# Patient Record
Sex: FEMALE | Race: WHITE | NOT HISPANIC OR LATINO | Employment: FULL TIME | URBAN - METROPOLITAN AREA
[De-identification: names, ages, dates, MRNs, and addresses within clinical notes are randomized per-mention and may not be internally consistent; named-entity substitution may affect disease eponyms.]

---

## 2017-07-13 PROBLEM — O44.02 PLACENTA PREVIA ANTEPARTUM IN SECOND TRIMESTER: Status: ACTIVE | Noted: 2017-07-13

## 2017-08-17 ENCOUNTER — TELEPHONE (OUTPATIENT)
Dept: GYNECOLOGIC ONCOLOGY | Facility: CLINIC | Age: 32
End: 2017-08-17

## 2017-08-17 ENCOUNTER — OFFICE VISIT (OUTPATIENT)
Dept: MATERNAL FETAL MEDICINE | Facility: CLINIC | Age: 32
End: 2017-08-17
Attending: OBSTETRICS & GYNECOLOGY
Payer: MEDICAID

## 2017-08-17 VITALS
SYSTOLIC BLOOD PRESSURE: 118 MMHG | WEIGHT: 189.38 LBS | DIASTOLIC BLOOD PRESSURE: 80 MMHG | BODY MASS INDEX: 28.79 KG/M2

## 2017-08-17 DIAGNOSIS — O43.219 PLACENTA ACCRETA, UNSPECIFIED TRIMESTER: Primary | ICD-10-CM

## 2017-08-17 DIAGNOSIS — Z36.89 ENCOUNTER FOR ULTRASOUND TO CHECK FETAL GROWTH: ICD-10-CM

## 2017-08-17 PROCEDURE — 99202 OFFICE O/P NEW SF 15 MIN: CPT | Mod: PBBFAC | Performed by: OBSTETRICS & GYNECOLOGY

## 2017-08-17 PROCEDURE — 76817 TRANSVAGINAL US OBSTETRIC: CPT | Mod: 26,S$PBB,, | Performed by: OBSTETRICS & GYNECOLOGY

## 2017-08-17 PROCEDURE — 76816 OB US FOLLOW-UP PER FETUS: CPT | Mod: 26,S$PBB,, | Performed by: OBSTETRICS & GYNECOLOGY

## 2017-08-17 PROCEDURE — 76817 TRANSVAGINAL US OBSTETRIC: CPT | Mod: PBBFAC | Performed by: OBSTETRICS & GYNECOLOGY

## 2017-08-17 PROCEDURE — 99213 OFFICE O/P EST LOW 20 MIN: CPT | Mod: S$PBB,TH,25, | Performed by: OBSTETRICS & GYNECOLOGY

## 2017-08-17 PROCEDURE — 76816 OB US FOLLOW-UP PER FETUS: CPT | Mod: PBBFAC | Performed by: OBSTETRICS & GYNECOLOGY

## 2017-08-17 PROCEDURE — 99999 PR PBB SHADOW E&M-NEW PATIENT-LVL II: CPT | Mod: PBBFAC,,, | Performed by: OBSTETRICS & GYNECOLOGY

## 2017-08-17 PROCEDURE — 3008F BODY MASS INDEX DOCD: CPT | Mod: ,,, | Performed by: OBSTETRICS & GYNECOLOGY

## 2017-08-17 RX ORDER — PRENATAL WITH FERROUS FUM AND FOLIC ACID 3080; 920; 120; 400; 22; 1.84; 3; 20; 10; 1; 12; 200; 27; 25; 2 [IU]/1; [IU]/1; MG/1; [IU]/1; MG/1; MG/1; MG/1; MG/1; MG/1; MG/1; UG/1; MG/1; MG/1; MG/1; MG/1
1 TABLET ORAL DAILY
COMMUNITY
End: 2019-02-19

## 2017-08-17 NOTE — TELEPHONE ENCOUNTER
08/17/17 [facility]. TA/MA

## 2017-08-17 NOTE — TELEPHONE ENCOUNTER
----- Message from Chadwick Parson MA sent at 8/17/2017  2:11 PM CDT -----      ----- Message -----  From: Garret Small MD  Sent: 8/17/2017   1:58 PM  To: Latonia Echavarria RN, Geovanny Combs Staff    Lets make it 2 weeks please.  ----- Message -----  From: Latonia Echavarria RN  Sent: 8/17/2017  11:10 AM  To: Garret Small MD, Geovanny Combs Staff    Good Morning     would like if  could see this patient in a about 2-3 weeks.  She is currently 30  [facility]  Please call patient with a appointment.    Thanks    Latonia

## 2017-08-17 NOTE — TELEPHONE ENCOUNTER
----- Message from Latonia Echavarria RN sent at 8/17/2017 11:10 AM CDT -----  Good Morning     would like if  could see this patient in a about 2-3 weeks.  She is currently 30  Weeks with a Accreta.  Please call patient with a appointment.    Thanks    Latonia

## 2017-08-17 NOTE — LETTER
August 17, 2017      Waylon Chery MD  [address] [facility] [address]           [facility] [place], operated [facility] - Maternal Fetal Med  [address] [facility] [address]  Phone: [phone]          Patient: Louisa Morrow   MR Number: [id number]   YOB: 1985   Date of Visit: 8/17/2017       Dear Dr. Waylon Chery:    Thank you for referring Louisa Morrow to me for evaluation. Attached you will find relevant portions of my assessment and plan of care.    If you have questions, please do not hesitate to call me. I look forward to following Louisa Morrow along with you.    Sincerely,    Roney Simons III, MD    Enclosure  CC:  No Recipients    If you would like to receive this communication electronically, please contact [email or web address] or [phone] to request more information on HeTexted Link access.    For providers and/or their staff who would like to refer a patient to [facility], please contact us through our one-stop-shop provider referral line, [facility], at [phone].    If you feel you have received this communication in error or would no longer like to receive these types of communications, please e-mail [email or web address]

## 2017-08-17 NOTE — PROGRESS NOTES
"Indication  ========    r/t office visit: f/u growth/ hydronephrosis/ possible accreta .    History  ======    General History  Other: no screenings  Previous Outcomes   4  Para 2  Victor children born living (T) 2  Victor children born (T) 2  Victor living children (L) 2  Other: 1 partial molar pregnancy    Maternal Assessment  =================    Weight 86 kg  Weight (lb) 190 lb  BP syst 118 mmHg  BP diast 80 mmHg    Method  ======    Transvaginal ultrasound examination, Transabdominal ultrasound examination. View: Sufficient.    Pregnancy  =========    Victor pregnancy. Number of fetuses: 1.    Dating  ======    Cycle: regular cycle  GA by "stated dating" 30 w + 1 d  RAQUEL by "stated dating": 10/25/2017  Ultrasound examination on: 2017  GA by U/S based upon: AC, BPD, Femur, HC  GA by U/S 31 w + 3 d  RAQUEL by U/S: 10/16/2017  Assigned: Dating performed on 2017, based on the external assessment  Assigned GA 30 w + 1 d  Assigned RAQUEL: 10/25/2017    General Evaluation  ==============    Cardiac activity: present.  bpm.  Fetal movements: visualized.  Presentation: cephalic.  Placenta: anterior, complete previa .  Umbilical cord: 3 vessel cord.  Amniotic fluid: MVP 5.8 cm.    Biophysical Profile  ==============    2: Fetal breathing movements  2: Gross body movements  2: Fetal tone  2: Amniotic fluid volume  : Biophysical profile score    Fetal Biometry  ============    Fetal Biometry  BPD 78.5 mm 31w 4d Hadlock  .3 mm 32w 3d Jhoan  .7 mm 31w 5d Hadlock  .6 mm 32w 4d Hadlock  Femur 56.7 mm 29w 5d Hadlock  EFW 1,793 g 56% Horace  Calculated by: Hadlock (BPD-HC-AC-FL)  EFW (lb) 3 lb  EFW (oz) 15 oz  Cephalic index 0.78  HC / AC 1.01  FL / BPD 0.72  FL / AC 0.20  MVP 5.8 cm   bpm  Head / Face / Neck   6.4 mm    Fetal Anatomy  ============    Cranium: normal  Lateral ventricles: normal  Posterior fossa: normal  Stomach: normal  Bladder: normal  Rt " kidney: pelvis = 10mm  Lt kidney: pelvis = 26mm  Gender: male  Wants to know gender: yes    Maternal Structures  ===============    Uterus / Cervix  Approach: Transabdominal  Cervical length 49.4 mm  Ovaries / Tubes / Adnexa  Rt ovary: Visualized  Lt ovary: Visualized    Consultation  ==========    FUV by Dr. Waylon Chery for Placenta Previa and C/S x 2    31  RAQUEL 10/25/17; 30w1d    Prenatal record, chart and OB History reviewed  My last note reviewed  Pt interviewed and examined  Ultrasound performed  No interval problems  No leaking, bleeding or discharge  Good FM    Blood Type O+    OB HX   - Mj; 7-0; C/S at term   - Xayda; 7-0; RC/S at term without probs.      Impression  =========    There is a placenta previa and I feel certain that there is at least an increta; the parametrium does not appear to be significantly involved  There is bilateral, moderate, fetal hydronephrosis (L>R), otherwise, the anatomy appears wnl  Normal fetal growth,  Normal amniotic fluid volume.  Normal cervical length.    Recommendation  ==============    Will plan for delivery around 34 weeks (-20)  Will obtain Fetal MRI to look at parametrium in more detail  Pt will meet Dr. Garret Small (Gyn Onc) and William Edwards (MFM) in the next 2-3 weeks  Will also have her meet with OB Anesthesia  Neonatology will notify Peds Urology to follow up fetal obstructive uropathy during the baby's  stay.

## 2017-08-31 ENCOUNTER — OFFICE VISIT (OUTPATIENT)
Dept: MATERNAL FETAL MEDICINE | Facility: CLINIC | Age: 32
End: 2017-08-31
Payer: MEDICAID

## 2017-08-31 ENCOUNTER — OFFICE VISIT (OUTPATIENT)
Dept: ANESTHESIOLOGY | Facility: OTHER | Age: 32
End: 2017-08-31
Attending: OBSTETRICS & GYNECOLOGY
Payer: MEDICAID

## 2017-08-31 VITALS
SYSTOLIC BLOOD PRESSURE: 121 MMHG | HEIGHT: 68 IN | WEIGHT: 193.56 LBS | DIASTOLIC BLOOD PRESSURE: 68 MMHG | BODY MASS INDEX: 29.34 KG/M2

## 2017-08-31 DIAGNOSIS — O43.219 PLACENTA ACCRETA, UNSPECIFIED TRIMESTER: ICD-10-CM

## 2017-08-31 PROCEDURE — 76815 OB US LIMITED FETUS(S): CPT | Mod: PBBFAC | Performed by: OBSTETRICS & GYNECOLOGY

## 2017-08-31 PROCEDURE — 3008F BODY MASS INDEX DOCD: CPT | Mod: ,,, | Performed by: OBSTETRICS & GYNECOLOGY

## 2017-08-31 PROCEDURE — 99212 OFFICE O/P EST SF 10 MIN: CPT | Mod: PBBFAC,25 | Performed by: OBSTETRICS & GYNECOLOGY

## 2017-08-31 PROCEDURE — 99999 PR PBB SHADOW E&M-EST. PATIENT-LVL II: CPT | Mod: PBBFAC,,, | Performed by: OBSTETRICS & GYNECOLOGY

## 2017-08-31 PROCEDURE — 99213 OFFICE O/P EST LOW 20 MIN: CPT | Mod: TH,S$PBB,, | Performed by: OBSTETRICS & GYNECOLOGY

## 2017-08-31 PROCEDURE — 76815 OB US LIMITED FETUS(S): CPT | Mod: 26,S$PBB,, | Performed by: OBSTETRICS & GYNECOLOGY

## 2017-08-31 NOTE — CONSULTS
[facility]    Date:    2017  10:57 AM     Anesthesia Consult: outpatient    Initial Consultation: Yes     Requested by: Obstetrician / MFM  Consult documentation sent back to physician.    Chief Complaint: placenta accreta    Diagnosis: placenta accreta    Reason for Consult: Anesthetic recommendations for delivery    Allergies:  Review of patient's allergies indicates no known allergies.    History of Present Illness:    Patient is a 32 years old,  female,  with placenta previa &  suspected increta. H/o C/S X 2 & D&C X 1. History also pertinent for significant gestational GERD & anxiety with prior c-sections. Per Dr. Montoya's note:     There is a placenta previa and I feel certain that there is at least an increta; the parametrium does not appear to be significantly involved  There is bilateral, moderate, fetal hydronephrosis (L>R), otherwise, the anatomy appears wnl  Normal fetal growth,  Normal amniotic fluid volume.  Normal cervical length.     Recommendation  ==============     Will plan for delivery around 34 weeks (-)  Will obtain Fetal MRI to look at parametrium in more detail  Pt will meet Dr. Garret Small (Gyn Onc) and William Edwards (MFM) in the next 2-3 weeks  Will also have her meet with OB Anesthesia      Past medical history:    Past Medical History:   Diagnosis Date    BV (bacterial vaginosis)     Migraines        Past surgical history:    Past Surgical History:   Procedure Laterality Date     SECTION      L Arm surgery      Primary Cesarian         Family history:    No family history on file.    Social History:    Social History     Social History    Marital status: Single     Spouse name: N/A    Number of children: N/A    Years of education: N/A     Occupational History    Not on file.     Social History Main Topics    Smoking status: Former Smoker     Types: Cigarettes    Smokeless tobacco: Never Used    Alcohol use No     "Drug use: No      Comment: +THC on 3/13/2017, states "has smoked since then"    Sexual activity: Yes     Partners: Male     Other Topics Concern    Not on file     Social History Narrative    No narrative on file     Medication:    Current Outpatient Prescriptions on File Prior to Visit   Medication Sig Dispense Refill    butalbital-acetaminophen-caffeine -40 mg (FIORICET, ESGIC) -40 mg per tablet Take 1 tablet by mouth every 4 (four) hours as needed for Headaches. 15 tablet 0    PNV,CALCIUM 72/IRON/FOLIC ACID (PRENATAL VITAMIN) Tab Take 1 tablet by mouth once daily.       No current facility-administered medications on file prior to visit.      Past anesthesia history:    Hx of general anesthesia problems: No    Diagnostic Studies    I have reviewed the following. Relevant findings as noted:    Blood group: O POS   CBC:   Lab Results   Component Value Date    WBC 17.2 (H) 2013    RBC 3.55 (L) 2013    HGB 10.9 (L) 2013    HCT 33.3 (L) 2013     2013     BMP: No results found for: GLU, NA, K, CL, CO2, BUN, CREATININE, CALCIUM, PROT, ALBUMIN  Coagulation: No results found for: INR, APTT    Review of Systems     Constitution: feels well  Respiratory:  None  Cardiovascular:  No HTN or heart disease  Hematology: no bleeding or clotting disorders  Gastrointestinal:  Significant gestational GERD, symptomatic when supine  Musculoskeletal:  None  Neurologic:  None  Psych: anxiety with prior  deliveries  Endocrine:  None    Physical Examination:     · Vital signs: HR 86 R 16 /68 SpO2 97%      General appearance: healthy, alert, no distresswell developed, well nourished female  Eye: negative, conjunctivae/corneas clear. PERRL, EOM's intact. Fundi benign.  Pulm: breath sounds equal and symmetric  Cardiac: regular rate and rhythm, good peripheral veins  Abdomen: gravid  Neuro: normal without focal findings  Musculoskeletal: not examined  Airway: TMD 6cm, good " open, dentition intact, FROM neck,  I (soft palate, uvula, fauces, and tonsillar pillars visible)      Problem Assessment    ASA 3 - Patient with moderate systemic disease with functional limitations    History of present disease is positive for placenta previa with suspected increta              Plans & Recommendations    Our anesthesia care plan consists of CSE vs. GETA for delivery depending on surgical plan at time of delivery, with GETA for delayed hysterectomy if indicated.She does state that she was extremely nervous for her 2 prior c-sections so GETA may be a good option for this patient at time of delivery.  However of note she does have significant gGERD. We discussed large bore PIV access, +/- central line placement, as well as arterial line placement for close BP monitoring & lab draws. We also discussed the potential need for massive transfusion, & ICU admission postpartum for extended monitoring.     Complexity: high    Risks:hemorrhage, massive transfusion, airway complications, death    Entertained and answered all question to the patient's and family's satisfaction.            Nelly Oliveros MD

## 2017-08-31 NOTE — LETTER
August 31, 2017      Waylon Chery MD  [address] [facility] [address] [facility] [address]           [facility] - Maternal Fetal Med  [address] [facility] [address]  Phone: [phone]          Patient: Louisa Morrow   MR Number: [id number]   YOB: 1985   Date of Visit: 8/31/2017       Dear Dr. Waylon Chery:    Thank you for referring Louisa Morrow to me for evaluation. Attached you will find relevant portions of my assessment and plan of care.    If you have questions, please do not hesitate to call me. I look forward to following Louisa Morrow along with you.    Sincerely,    Ismael Edwards MD    Enclosure  CC:  No Recipients    If you would like to receive this communication electronically, please contact [email or web address] or [phone] to request more information on [employer] Link access.    For providers and/or their staff who would like to refer a patient to [facility], please contact us through our one-stop-shop provider referral line, [facility], at [phone].    If you feel you have received this communication in error or would no longer like to receive these types of communications, please e-mail [email or web address]

## 2017-09-01 ENCOUNTER — INITIAL CONSULT (OUTPATIENT)
Dept: GYNECOLOGIC ONCOLOGY | Facility: CLINIC | Age: 32
End: 2017-09-01
Payer: MEDICAID

## 2017-09-01 VITALS
DIASTOLIC BLOOD PRESSURE: 63 MMHG | BODY MASS INDEX: 29.63 KG/M2 | HEART RATE: 103 BPM | WEIGHT: 194.88 LBS | SYSTOLIC BLOOD PRESSURE: 116 MMHG

## 2017-09-01 DIAGNOSIS — O43.213 PLACENTA ACCRETA IN THIRD TRIMESTER: ICD-10-CM

## 2017-09-01 DIAGNOSIS — O44.02 PLACENTA PREVIA ANTEPARTUM IN SECOND TRIMESTER: Primary | ICD-10-CM

## 2017-09-01 PROCEDURE — 99205 OFFICE O/P NEW HI 60 MIN: CPT | Mod: S$PBB,TH,, | Performed by: OBSTETRICS & GYNECOLOGY

## 2017-09-01 PROCEDURE — 99212 OFFICE O/P EST SF 10 MIN: CPT | Mod: PBBFAC | Performed by: OBSTETRICS & GYNECOLOGY

## 2017-09-01 PROCEDURE — 3008F BODY MASS INDEX DOCD: CPT | Mod: ,,, | Performed by: OBSTETRICS & GYNECOLOGY

## 2017-09-01 PROCEDURE — 99999 PR PBB SHADOW E&M-EST. PATIENT-LVL II: CPT | Mod: PBBFAC,,, | Performed by: OBSTETRICS & GYNECOLOGY

## 2017-09-01 RX ORDER — PROMETHAZINE HYDROCHLORIDE 25 MG/1
25 TABLET ORAL EVERY 4 HOURS
COMMUNITY
End: 2017-10-02 | Stop reason: SDUPTHER

## 2017-09-01 NOTE — PROGRESS NOTES
"Indication  ========    Evaluate placenta accreta .    History  ======    General History  Other: no screenings  Previous Outcomes   4  Para 2  Victor children born living (T) 2  Victor children born (T) 2  Victor living children (L) 2  Other: 1 partial molar pregnancy    Method  ======    Transabdominal ultrasound examination.    Pregnancy  =========    Victor pregnancy. Number of fetuses: 1.    Dating  ======    Cycle: regular cycle  GA by "stated dating" 32 w + 1 d  RAQUEL by "stated dating": 10/25/2017  Assigned: Dating performed on 2017, based on the external assessment  Assigned GA 32 w + 1 d  Assigned RAQUEL: 10/25/2017    General Evaluation  ==============    Cardiac activity: present.  bpm.  Fetal movements: visualized.  Presentation: cephalic.  Placenta: anterior, anterior, partial previa, Placenta accreta.    Fetal Anatomy  ===========    Gender: male.    Consultation  ==========    Chart and notes from prior visit reviewed.    A limited ultrasound evaluation was performed today to evaluate the placenta and the anterior uterine surface again. We continue to see  evidence of a loss of the interface between the myometrium and the placenta. This appears consistent with a morbidly adherent placenta.  There is no evidence of obvious bladder involvement but we do see a number of lacunae within the placenta. There does not appear to be any  protrusion of placental mass into the bladder but there is vascularity between the bladder and the placenta.    I discussed the usual management strategy at length with Louisa and her . I explained our rationale for typically trying to leave the  placenta in situ but that in some circumstances if there is profuse vaginal bleeding this may not be possible. We reviewed the risks of the  surgery including bleeding, infection, damage to the bowel and bladder, the potential need for massive transfusion, and the potential need for  radical pelvic " surgery. We also discussed that this will be through a vertical skin incision. We discussed the role of interventional radiology and  embolization with the ultimate goal of trying to delay definitive surgical management until the vascularity has diminished markedly to reduce the  amount of transfusion and to hopefully reduce the need for bladder resection.    She met with Dr. Oliveros in OB anesthesia earlier today and she will meet with Dr. Small in GYN oncology tomorrow. We will then coordinate a  time for delivery at 34-35 weeks. I explained that while this is early in there are some risks of prematurity at this gestational age, we will plan  on giving her corticosteroids to enhance fetal lung maturity prior to this. We choose 34 weeks to try to avoid the onset of  labor and  bleeding.    I overall spent approximately 20 minutes in face to face time with the patient and her family, greater than 50% of which was in counseling and  care coordination.    Impression  =========    Placenta previa with anterior placenta accreta  No obvious bladder or parametrial involvement.    Recommendation  ==============    1. Seeing Dr. Small in Gyn Oncology   2. Will plan delivery week of -  3. Steroids 3-4 days prior to delivery.    Thank you again for allowing us to participate in the care of your patients. If you have any questions concerning today's consultation, feel free  to contact me or one of my partners. We can be reached at [phone] during normal business hours. If you have a question after normal  business hours, please contact Labor and Delivery at [phone].

## 2017-09-01 NOTE — PROGRESS NOTES
Subjective:      Patient ID: Louisa Morrow is a 32 y.o. female.    Chief Complaint: Advice Only      HPI  Presents today for surgical planning related to her known placenta accreta/increta.  She has had 2 previous C/S and was noted to have a previa prompting MFM eval. She is currently planning to deliver around 34 weeks, which would be the week of -.  She is asymptomatic.  Previously seen and counseled by Per Simons and Jerry regarding our approach to delivery.  Review of Systems   Constitutional: Negative for activity change, appetite change, chills, fatigue and fever.   HENT: Negative for hearing loss, mouth sores, nosebleeds, sore throat and tinnitus.    Eyes: Negative for visual disturbance.   Respiratory: Negative for cough, chest tightness, shortness of breath and wheezing.    Cardiovascular: Negative for chest pain and leg swelling.   Gastrointestinal: Negative for abdominal distention, abdominal pain, blood in stool, constipation, diarrhea, nausea and vomiting.   Genitourinary: Negative for dysuria, flank pain, frequency, hematuria, pelvic pain, vaginal bleeding, vaginal discharge and vaginal pain.   Musculoskeletal: Negative for arthralgias and back pain.   Skin: Negative for rash.   Neurological: Negative for dizziness, seizures, syncope, weakness and numbness.   Hematological: Does not bruise/bleed easily.   Psychiatric/Behavioral: Negative for confusion and sleep disturbance. The patient is not nervous/anxious.         Past Medical History:   Diagnosis Date    BV (bacterial vaginosis)     Migraines      Past Surgical History:   Procedure Laterality Date     SECTION      L Arm surgery      Primary Cesarian  2010     Family History   Problem Relation Age of Onset    Lung cancer Maternal Grandfather     Cancer Paternal Grandmother      Social History     Social History    Marital status: Single     Spouse name: N/A    Number of children: N/A    Years of education: N/A  "    Occupational History    Not on file.     Social History Main Topics    Smoking status: Former Smoker     Types: Cigarettes     Quit date: 07/2016    Smokeless tobacco: Never Used    Alcohol use No    Drug use: No      Comment: +THC on 3/13/2017, states "has smoked since then"    Sexual activity: Yes     Partners: Male     Other Topics Concern    Not on file     Social History Narrative    No narrative on file     Current Outpatient Prescriptions   Medication Sig    PNV,CALCIUM 72/IRON/FOLIC ACID (PRENATAL VITAMIN) Tab Take 1 tablet by mouth once daily.    promethazine (PHENERGAN) 25 MG tablet Take 25 mg by mouth every 4 (four) hours.     No current facility-administered medications for this visit.      Review of patient's allergies indicates:  No Known Allergies    Objective:   Physical Exam:   Constitutional: She is oriented to person, place, and time. She appears well-developed and well-nourished. No distress.    HENT:   Head: Normocephalic and atraumatic.    Eyes: No scleral icterus.    Neck: Normal range of motion. Neck supple.    Cardiovascular: Normal rate and intact distal pulses.  Exam reveals no cyanosis and no edema.     Pulmonary/Chest: Effort normal. No respiratory distress. She exhibits no tenderness.        Abdominal: Soft. Normal appearance. She exhibits mass (gravis uterus above the umbilicus). She exhibits no distension, no fluid wave and no ascites. There is no tenderness. There is no rigidity, no rebound and no guarding. No hernia.             Musculoskeletal: Normal range of motion and moves all extremeties. She exhibits no edema.      Lymphadenopathy:     She has no cervical adenopathy.    Neurological: She is alert and oriented to person, place, and time.    Skin: Skin is warm. No rash noted. No cyanosis or erythema.    Psychiatric: She has a normal mood and affect. Thought content normal.       Assessment:     1. Placenta previa antepartum in second trimester    2. Placenta " accreta in third trimester        Plan:       Counseled the patient on our usual approach to abnormal placentation.  She is aware that she may require immediate PP hysterectomy as opposed to interval hysterectomy.  Ideally, we would plan delivery for 9/16 with immediate post C/S embolization.  Would then perform weekly lab draws and visits and perform hysterectomy approx 4 weeks PP.  We will do all of this via a midline incision. The risks, benefits, and indications of the procedure were discussed with the patient.  These included bleeding requiring transfusion, ICU stay, infection, damage to surrounding tissues, intentional cystotomy with possible bladder resection and prolonged marie, and the possibility of major complications including and death.  She voiced understanding, all questions were answered and consents were signed.  Will also need a medicaid hyst consent.

## 2017-09-01 NOTE — LETTER
September 1, 2017      Roney Simons III, MD  [address]  [facility] [address]           [facility] - Gynecologic Oncology  [address], [address]  [facility] [address]  Phone: [phone]  Fax: [phone]          Patient: Louisa Morrow   MR Number: [id number]   YOB: 1985   Date of Visit: 9/1/2017       Dear Dr. Roney Simons III:    Thank you for referring Louisa Morrow to me for evaluation. Attached you will find relevant portions of my assessment and plan of care.    If you have questions, please do not hesitate to call me. I look forward to following Louisa Morrow along with you.    Sincerely,    Garret Small MD    Enclosure  CC:  No Recipients    If you would like to receive this communication electronically, please contact [email or web address] or [phone] to request more information on Intro[employer]ive Link access.    For providers and/or their staff who would like to refer a patient to [facility], please contact us through our one-stop-shop provider referral line, [facility], at [phone].    If you feel you have received this communication in error or would no longer like to receive these types of communications, please e-mail [email or web address]

## 2017-09-05 ENCOUNTER — TELEPHONE (OUTPATIENT)
Dept: GYNECOLOGIC ONCOLOGY | Facility: CLINIC | Age: 32
End: 2017-09-05

## 2017-09-05 DIAGNOSIS — O44.20 PARTIAL PLACENTA PREVIA: Primary | ICD-10-CM

## 2017-09-07 ENCOUNTER — TELEPHONE (OUTPATIENT)
Dept: MATERNAL FETAL MEDICINE | Facility: CLINIC | Age: 32
End: 2017-09-07

## 2017-09-07 DIAGNOSIS — O43.213 PLACENTA ACCRETA IN THIRD TRIMESTER: Primary | ICD-10-CM

## 2017-09-07 DIAGNOSIS — O43.219 PLACENTA ACCRETA AFFECTING DELIVERY: Primary | ICD-10-CM

## 2017-09-07 RX ORDER — BETAMETHASONE SODIUM PHOSPHATE AND BETAMETHASONE ACETATE 3; 3 MG/ML; MG/ML
12 INJECTION, SUSPENSION INTRA-ARTICULAR; INTRALESIONAL; INTRAMUSCULAR; SOFT TISSUE ONCE
Status: DISCONTINUED | OUTPATIENT
Start: 2017-09-11 | End: 2017-09-29 | Stop reason: HOSPADM

## 2017-09-07 RX ORDER — BETAMETHASONE SODIUM PHOSPHATE AND BETAMETHASONE ACETATE 3; 3 MG/ML; MG/ML
12 INJECTION, SUSPENSION INTRA-ARTICULAR; INTRALESIONAL; INTRAMUSCULAR; SOFT TISSUE ONCE
Status: CANCELLED | OUTPATIENT
Start: 2017-09-11

## 2017-09-07 NOTE — TELEPHONE ENCOUNTER
----- Message from Ismael Edwards MD sent at 9/7/2017 11:38 AM CDT -----  Can we let her know surgery is confirmed for Wed 9/13 at 0700. We need her to come get pre-op labs on 9/11--CBC and T&C for 4 units.  We need to give her BMZ on 9/11 and then get her doc to do a second dose on 9/12. I will contact his office as long as that works with her.  Thanks    J      No answer, unable to leave voicemail. Will attempt to call patient again later today.

## 2017-09-07 NOTE — TELEPHONE ENCOUNTER
Pt returning call to [facility], per Dr. Edwards's notes pt was informed that surgery is scheduled for 9/13/17 at 7am and pt to check in for 5am in L&D nothing to eat or drink after midnight and per anesthesia request, pt is to remove at least one of her acrylic nails before hospital admit.      Pt scheduled for Lab & BMZ injection on 9/11/17 at 10am. Pt given instructions on appointment locations and is to follow-up with Dr. Chery (primary OB) on 9/12/17 around 10am for 2nd BMZ injection.     Pt verbalized understanding of information and all questions were answered.

## 2017-09-08 ENCOUNTER — ANESTHESIA EVENT (OUTPATIENT)
Dept: SURGERY | Facility: OTHER | Age: 32
End: 2017-09-08
Payer: MEDICAID

## 2017-09-08 ENCOUNTER — OUTSIDE PLACE OF SERVICE (OUTPATIENT)
Dept: OBSTETRICS AND GYNECOLOGY | Facility: CLINIC | Age: 32
End: 2017-09-08
Payer: MEDICAID

## 2017-09-08 ENCOUNTER — OUTSIDE PLACE OF SERVICE (OUTPATIENT)
Dept: OBSTETRICS AND GYNECOLOGY | Facility: CLINIC | Age: 32
End: 2017-09-08

## 2017-09-08 PROCEDURE — 99213 OFFICE O/P EST LOW 20 MIN: CPT | Mod: TH,,, | Performed by: OBSTETRICS & GYNECOLOGY

## 2017-09-11 ENCOUNTER — LAB VISIT (OUTPATIENT)
Dept: LAB | Facility: OTHER | Age: 32
End: 2017-09-11
Attending: OBSTETRICS & GYNECOLOGY
Payer: MEDICAID

## 2017-09-11 ENCOUNTER — TELEPHONE (OUTPATIENT)
Dept: MATERNAL FETAL MEDICINE | Facility: CLINIC | Age: 32
End: 2017-09-11

## 2017-09-11 DIAGNOSIS — O43.219 PLACENTA ACCRETA AFFECTING DELIVERY: ICD-10-CM

## 2017-09-11 LAB
ABO + RH BLD: NORMAL
BASOPHILS # BLD AUTO: 0.02 K/UL
BASOPHILS NFR BLD: 0.1 %
BLD GP AB SCN CELLS X3 SERPL QL: NORMAL
DIFFERENTIAL METHOD: ABNORMAL
EOSINOPHIL # BLD AUTO: 0.2 K/UL
EOSINOPHIL NFR BLD: 1.1 %
ERYTHROCYTE [DISTWIDTH] IN BLOOD BY AUTOMATED COUNT: 13.6 %
HCT VFR BLD AUTO: 41.7 %
HGB BLD-MCNC: 13.8 G/DL
LYMPHOCYTES # BLD AUTO: 1.9 K/UL
LYMPHOCYTES NFR BLD: 11.6 %
MCH RBC QN AUTO: 30.9 PG
MCHC RBC AUTO-ENTMCNC: 33.1 G/DL
MCV RBC AUTO: 94 FL
MONOCYTES # BLD AUTO: 1.3 K/UL
MONOCYTES NFR BLD: 8 %
NEUTROPHILS # BLD AUTO: 13 K/UL
NEUTROPHILS NFR BLD: 77.9 %
PLATELET # BLD AUTO: 161 K/UL
PMV BLD AUTO: 11 FL
RBC # BLD AUTO: 4.46 M/UL
WBC # BLD AUTO: 16.7 K/UL

## 2017-09-11 PROCEDURE — 86901 BLOOD TYPING SEROLOGIC RH(D): CPT

## 2017-09-11 PROCEDURE — 86900 BLOOD TYPING SEROLOGIC ABO: CPT

## 2017-09-11 PROCEDURE — 36415 COLL VENOUS BLD VENIPUNCTURE: CPT

## 2017-09-11 PROCEDURE — 85025 COMPLETE CBC W/AUTO DIFF WBC: CPT

## 2017-09-11 NOTE — TELEPHONE ENCOUNTER
"----- Message from Deyanira Nj sent at 9/11/2017  8:06 AM CDT -----  Contact: self  Pt MRN [id number] Car Morrowa called to speak with Dr Edwards's nurse regarding her shot this morning at [facility]. Pt can be reached at [phone].      Returned pt's call and patient on her way to [facility] for lab appointment but did state that she received both "steroid shots" last week with Dr. Chery's office. Pt instructed to call [facility] when labs are complete so that I can give her the location to meet with Interventional Radiology to sign an additional consent.    Also calling Dr. Chery's office to confirm BMZ series.    Pt verbalized understanding of information.    "

## 2017-09-12 ENCOUNTER — TELEPHONE (OUTPATIENT)
Dept: GYNECOLOGIC ONCOLOGY | Facility: CLINIC | Age: 32
End: 2017-09-12

## 2017-09-12 DIAGNOSIS — O43.219 PLACENTA ACCRETA AFFECTING DELIVERY: Primary | ICD-10-CM

## 2017-09-12 DIAGNOSIS — O44.02 PLACENTA PREVIA ANTEPARTUM IN SECOND TRIMESTER: Primary | ICD-10-CM

## 2017-09-12 NOTE — TELEPHONE ENCOUNTER
09/12/17 called pt regarding surg but unable to leave a message due to voicemail is not set up. TA/MA

## 2017-09-13 ENCOUNTER — ANESTHESIA (OUTPATIENT)
Dept: SURGERY | Facility: OTHER | Age: 32
End: 2017-09-13
Payer: MEDICAID

## 2017-09-13 ENCOUNTER — SURGERY (OUTPATIENT)
Age: 32
End: 2017-09-13

## 2017-09-13 ENCOUNTER — HOSPITAL ENCOUNTER (INPATIENT)
Facility: OTHER | Age: 32
LOS: 6 days | Discharge: HOME | End: 2017-09-19
Attending: OBSTETRICS & GYNECOLOGY | Admitting: OBSTETRICS & GYNECOLOGY
Payer: MEDICAID

## 2017-09-13 DIAGNOSIS — O44.20 PARTIAL PLACENTA PREVIA: ICD-10-CM

## 2017-09-13 DIAGNOSIS — Z3A.34 34 WEEKS GESTATION OF PREGNANCY: Primary | ICD-10-CM

## 2017-09-13 DIAGNOSIS — O44.02 PLACENTA PREVIA ANTEPARTUM IN SECOND TRIMESTER: ICD-10-CM

## 2017-09-13 DIAGNOSIS — O43.219 PLACENTA ACCRETA: ICD-10-CM

## 2017-09-13 LAB
ABO + RH BLD: NORMAL
ALLENS TEST: ABNORMAL
APTT BLDCRRT: 33.8 SEC
BASOPHILS # BLD AUTO: 0.01 K/UL
BASOPHILS NFR BLD: 0.1 %
BLD GP AB SCN CELLS X3 SERPL QL: NORMAL
BLD PROD TYP BPU: NORMAL
BLOOD UNIT EXPIRATION DATE: NORMAL
BLOOD UNIT TYPE CODE: 5100
BLOOD UNIT TYPE: NORMAL
CODING SYSTEM: NORMAL
DIFFERENTIAL METHOD: ABNORMAL
DISPENSE STATUS: NORMAL
EOSINOPHIL # BLD AUTO: 0.1 K/UL
EOSINOPHIL NFR BLD: 0.7 %
ERYTHROCYTE [DISTWIDTH] IN BLOOD BY AUTOMATED COUNT: 13.4 %
FIBRINOGEN PPP-MCNC: 371 MG/DL
HCO3 UR-SCNC: 25 MMOL/L (ref 24–28)
HCT VFR BLD AUTO: 38 %
HGB BLD-MCNC: 12.7 G/DL
INR PPP: 0.9
LYMPHOCYTES # BLD AUTO: 1.7 K/UL
LYMPHOCYTES NFR BLD: 9.4 %
MCH RBC QN AUTO: 30.6 PG
MCHC RBC AUTO-ENTMCNC: 33.4 G/DL
MCV RBC AUTO: 92 FL
MONOCYTES # BLD AUTO: 1.4 K/UL
MONOCYTES NFR BLD: 7.7 %
NEUTROPHILS # BLD AUTO: 14.3 K/UL
NEUTROPHILS NFR BLD: 81.2 %
NUM UNITS TRANS PACKED RBC: NORMAL
NUM UNITS TRANS PACKED RBC: NORMAL
PCO2 BLDA: 47.4 MMHG (ref 35–45)
PH SMN: 7.33 [PH] (ref 7.35–7.45)
PLATELET # BLD AUTO: 140 K/UL
PMV BLD AUTO: 10.6 FL
PO2 BLDA: 17 MMHG (ref 80–100)
POC BE: -1 MMOL/L
POC SATURATED O2: 22 % (ref 95–100)
PROTHROMBIN TIME: 10.5 SEC
RBC # BLD AUTO: 4.15 M/UL
SAMPLE: ABNORMAL
SITE: ABNORMAL
TRANS ERYTHROCYTES VOL PATIENT: NORMAL ML
WBC # BLD AUTO: 17.58 K/UL

## 2017-09-13 PROCEDURE — 85730 THROMBOPLASTIN TIME PARTIAL: CPT

## 2017-09-13 PROCEDURE — 99152 MOD SED SAME PHYS/QHP 5/>YRS: CPT | Performed by: RADIOLOGY

## 2017-09-13 PROCEDURE — 04LF3DU OCCLUSION OF LEFT UTERINE ARTERY WITH INTRALUMINAL DEVICE, PERCUTANEOUS APPROACH: ICD-10-PCS | Performed by: RADIOLOGY

## 2017-09-13 PROCEDURE — 59514 CESAREAN DELIVERY ONLY: CPT | Mod: 62,AT,, | Performed by: OBSTETRICS & GYNECOLOGY

## 2017-09-13 PROCEDURE — 25000003 PHARM REV CODE 250: Performed by: OBSTETRICS & GYNECOLOGY

## 2017-09-13 PROCEDURE — 63600175 PHARM REV CODE 636 W HCPCS: Performed by: ANESTHESIOLOGY

## 2017-09-13 PROCEDURE — C1887 CATHETER, GUIDING: HCPCS | Performed by: RADIOLOGY

## 2017-09-13 PROCEDURE — 63600175 PHARM REV CODE 636 W HCPCS: Performed by: RADIOLOGY

## 2017-09-13 PROCEDURE — 51702 INSERT TEMP BLADDER CATH: CPT

## 2017-09-13 PROCEDURE — 25000003 PHARM REV CODE 250: Performed by: ANESTHESIOLOGY

## 2017-09-13 PROCEDURE — 37000008 HC ANESTHESIA 1ST 15 MINUTES: Performed by: OBSTETRICS & GYNECOLOGY

## 2017-09-13 PROCEDURE — 25500020 PHARM REV CODE 255

## 2017-09-13 PROCEDURE — 36000709 HC OR TIME LEV III EA ADD 15 MIN: Performed by: OBSTETRICS & GYNECOLOGY

## 2017-09-13 PROCEDURE — 85610 PROTHROMBIN TIME: CPT

## 2017-09-13 PROCEDURE — 99153 MOD SED SAME PHYS/QHP EA: CPT | Performed by: RADIOLOGY

## 2017-09-13 PROCEDURE — 86900 BLOOD TYPING SEROLOGIC ABO: CPT

## 2017-09-13 PROCEDURE — 59514 CESAREAN DELIVERY ONLY: CPT | Mod: ,,, | Performed by: ANESTHESIOLOGY

## 2017-09-13 PROCEDURE — 25000003 PHARM REV CODE 250: Performed by: RADIOLOGY

## 2017-09-13 PROCEDURE — 27800517 HC TRAY,EPIDURAL-CONTINUOUS: Performed by: ANESTHESIOLOGY

## 2017-09-13 PROCEDURE — 04LE3DT OCCLUSION OF RIGHT UTERINE ARTERY WITH INTRALUMINAL DEVICE, PERCUTANEOUS APPROACH: ICD-10-PCS | Performed by: RADIOLOGY

## 2017-09-13 PROCEDURE — C1769 GUIDE WIRE: HCPCS | Performed by: RADIOLOGY

## 2017-09-13 PROCEDURE — 36000685 HC CESAREAN SECTION LEVEL I

## 2017-09-13 PROCEDURE — 37000009 HC ANESTHESIA EA ADD 15 MINS: Performed by: OBSTETRICS & GYNECOLOGY

## 2017-09-13 PROCEDURE — 27201224 HC CATH ANGIOGRAM: Performed by: RADIOLOGY

## 2017-09-13 PROCEDURE — 36415 COLL VENOUS BLD VENIPUNCTURE: CPT

## 2017-09-13 PROCEDURE — 86850 RBC ANTIBODY SCREEN: CPT

## 2017-09-13 PROCEDURE — S0028 INJECTION, FAMOTIDINE, 20 MG: HCPCS | Performed by: OBSTETRICS & GYNECOLOGY

## 2017-09-13 PROCEDURE — 36000708 HC OR TIME LEV III 1ST 15 MIN: Performed by: OBSTETRICS & GYNECOLOGY

## 2017-09-13 PROCEDURE — 85384 FIBRINOGEN ACTIVITY: CPT

## 2017-09-13 PROCEDURE — 63600175 PHARM REV CODE 636 W HCPCS

## 2017-09-13 PROCEDURE — 86920 COMPATIBILITY TEST SPIN: CPT

## 2017-09-13 PROCEDURE — 25000003 PHARM REV CODE 250

## 2017-09-13 PROCEDURE — S0020 INJECTION, BUPIVICAINE HYDRO: HCPCS | Performed by: ANESTHESIOLOGY

## 2017-09-13 PROCEDURE — 36620 INSERTION CATHETER ARTERY: CPT | Mod: 59,,, | Performed by: ANESTHESIOLOGY

## 2017-09-13 PROCEDURE — 85025 COMPLETE CBC W/AUTO DIFF WBC: CPT

## 2017-09-13 PROCEDURE — 27200710 HC EPIDURAL INFUSION PUMP SET: Performed by: ANESTHESIOLOGY

## 2017-09-13 PROCEDURE — 11000001 HC ACUTE MED/SURG PRIVATE ROOM

## 2017-09-13 PROCEDURE — 27201423 OPTIME MED/SURG SUP & DEVICES STERILE SUPPLY: Performed by: OBSTETRICS & GYNECOLOGY

## 2017-09-13 RX ORDER — ONDANSETRON HYDROCHLORIDE 2 MG/ML
INJECTION, SOLUTION INTRAMUSCULAR; INTRAVENOUS
Status: DISCONTINUED | OUTPATIENT
Start: 2017-09-13 | End: 2017-09-13

## 2017-09-13 RX ORDER — SODIUM CHLORIDE 9 MG/ML
INJECTION, SOLUTION INTRAVENOUS CONTINUOUS
Status: DISCONTINUED | OUTPATIENT
Start: 2017-09-13 | End: 2017-09-16

## 2017-09-13 RX ORDER — HYDROMORPHONE HYDROCHLORIDE 2 MG/ML
1 INJECTION, SOLUTION INTRAMUSCULAR; INTRAVENOUS; SUBCUTANEOUS ONCE
Status: COMPLETED | OUTPATIENT
Start: 2017-09-13 | End: 2017-09-13

## 2017-09-13 RX ORDER — FENTANYL CITRATE 50 UG/ML
INJECTION, SOLUTION INTRAMUSCULAR; INTRAVENOUS
Status: DISCONTINUED | OUTPATIENT
Start: 2017-09-13 | End: 2017-09-19 | Stop reason: HOSPADM

## 2017-09-13 RX ORDER — HYDROMORPHONE HYDROCHLORIDE 2 MG/ML
INJECTION, SOLUTION INTRAMUSCULAR; INTRAVENOUS; SUBCUTANEOUS
Status: DISCONTINUED | OUTPATIENT
Start: 2017-09-13 | End: 2017-09-13

## 2017-09-13 RX ORDER — HYDROCODONE BITARTRATE AND ACETAMINOPHEN 5; 325 MG/1; MG/1
1 TABLET ORAL EVERY 4 HOURS PRN
Status: DISCONTINUED | OUTPATIENT
Start: 2017-09-14 | End: 2017-09-19 | Stop reason: HOSPADM

## 2017-09-13 RX ORDER — HYDROCODONE BITARTRATE AND ACETAMINOPHEN 5; 325 MG/1; MG/1
2 TABLET ORAL EVERY 4 HOURS PRN
Status: DISCONTINUED | OUTPATIENT
Start: 2017-09-13 | End: 2017-09-14

## 2017-09-13 RX ORDER — HYDROMORPHONE HYDROCHLORIDE 1 MG/ML
1 INJECTION, SOLUTION INTRAMUSCULAR; INTRAVENOUS; SUBCUTANEOUS
Status: DISCONTINUED | OUTPATIENT
Start: 2017-09-13 | End: 2017-09-15

## 2017-09-13 RX ORDER — HYDROMORPHONE HYDROCHLORIDE 2 MG/ML
INJECTION, SOLUTION INTRAMUSCULAR; INTRAVENOUS; SUBCUTANEOUS
Status: DISCONTINUED | OUTPATIENT
Start: 2017-09-13 | End: 2017-09-15

## 2017-09-13 RX ORDER — CEFAZOLIN SODIUM 2 G/50ML
2 SOLUTION INTRAVENOUS
Status: DISCONTINUED | OUTPATIENT
Start: 2017-09-13 | End: 2017-09-13

## 2017-09-13 RX ORDER — SODIUM CHLORIDE, SODIUM LACTATE, POTASSIUM CHLORIDE, CALCIUM CHLORIDE 600; 310; 30; 20 MG/100ML; MG/100ML; MG/100ML; MG/100ML
INJECTION, SOLUTION INTRAVENOUS CONTINUOUS
Status: DISCONTINUED | OUTPATIENT
Start: 2017-09-13 | End: 2017-09-13

## 2017-09-13 RX ORDER — HYDROCODONE BITARTRATE AND ACETAMINOPHEN 10; 325 MG/1; MG/1
1 TABLET ORAL EVERY 4 HOURS PRN
Status: DISCONTINUED | OUTPATIENT
Start: 2017-09-14 | End: 2017-09-19 | Stop reason: HOSPADM

## 2017-09-13 RX ORDER — MIDAZOLAM HYDROCHLORIDE 1 MG/ML
INJECTION INTRAMUSCULAR; INTRAVENOUS
Status: DISCONTINUED | OUTPATIENT
Start: 2017-09-13 | End: 2017-09-13

## 2017-09-13 RX ORDER — SIMETHICONE 80 MG
1 TABLET,CHEWABLE ORAL EVERY 6 HOURS PRN
Status: DISCONTINUED | OUTPATIENT
Start: 2017-09-13 | End: 2017-09-19 | Stop reason: HOSPADM

## 2017-09-13 RX ORDER — KETOROLAC TROMETHAMINE 30 MG/ML
30 INJECTION, SOLUTION INTRAMUSCULAR; INTRAVENOUS EVERY 6 HOURS
Status: DISCONTINUED | OUTPATIENT
Start: 2017-09-13 | End: 2017-09-14

## 2017-09-13 RX ORDER — IBUPROFEN 600 MG/1
600 TABLET ORAL EVERY 6 HOURS
Status: DISCONTINUED | OUTPATIENT
Start: 2017-09-14 | End: 2017-09-19 | Stop reason: HOSPADM

## 2017-09-13 RX ORDER — MISOPROSTOL 200 UG/1
800 TABLET ORAL
Status: DISCONTINUED | OUTPATIENT
Start: 2017-09-13 | End: 2017-09-13

## 2017-09-13 RX ORDER — DOCUSATE SODIUM 100 MG/1
200 CAPSULE, LIQUID FILLED ORAL 2 TIMES DAILY
Status: DISCONTINUED | OUTPATIENT
Start: 2017-09-13 | End: 2017-09-19 | Stop reason: HOSPADM

## 2017-09-13 RX ORDER — OXYCODONE HYDROCHLORIDE 5 MG/1
5 TABLET ORAL EVERY 4 HOURS PRN
Status: DISCONTINUED | OUTPATIENT
Start: 2017-09-13 | End: 2017-09-14

## 2017-09-13 RX ORDER — FENTANYL CITRATE 50 UG/ML
INJECTION, SOLUTION INTRAMUSCULAR; INTRAVENOUS
Status: DISCONTINUED | OUTPATIENT
Start: 2017-09-13 | End: 2017-09-13

## 2017-09-13 RX ORDER — PHENYLEPHRINE HYDROCHLORIDE 10 MG/ML
INJECTION INTRAVENOUS
Status: DISCONTINUED | OUTPATIENT
Start: 2017-09-13 | End: 2017-09-13

## 2017-09-13 RX ORDER — SODIUM CHLORIDE, SODIUM LACTATE, POTASSIUM CHLORIDE, CALCIUM CHLORIDE 600; 310; 30; 20 MG/100ML; MG/100ML; MG/100ML; MG/100ML
INJECTION, SOLUTION INTRAVENOUS CONTINUOUS PRN
Status: DISCONTINUED | OUTPATIENT
Start: 2017-09-13 | End: 2017-09-13

## 2017-09-13 RX ORDER — DIPHENHYDRAMINE HCL 25 MG
25 CAPSULE ORAL EVERY 4 HOURS PRN
Status: DISCONTINUED | OUTPATIENT
Start: 2017-09-13 | End: 2017-09-19 | Stop reason: HOSPADM

## 2017-09-13 RX ORDER — METHYLERGONOVINE MALEATE 0.2 MG/ML
200 INJECTION INTRAVENOUS
Status: DISCONTINUED | OUTPATIENT
Start: 2017-09-13 | End: 2017-09-13

## 2017-09-13 RX ORDER — ONDANSETRON 8 MG/1
8 TABLET, ORALLY DISINTEGRATING ORAL EVERY 8 HOURS PRN
Status: DISCONTINUED | OUTPATIENT
Start: 2017-09-13 | End: 2017-09-19 | Stop reason: HOSPADM

## 2017-09-13 RX ORDER — FAMOTIDINE 10 MG/ML
20 INJECTION INTRAVENOUS
Status: DISCONTINUED | OUTPATIENT
Start: 2017-09-13 | End: 2017-09-13

## 2017-09-13 RX ORDER — OXYTOCIN/RINGER'S LACTATE 20/1000 ML
333 PLASTIC BAG, INJECTION (ML) INTRAVENOUS CONTINUOUS
Status: DISCONTINUED | OUTPATIENT
Start: 2017-09-13 | End: 2017-09-13

## 2017-09-13 RX ORDER — OXYCODONE HYDROCHLORIDE 5 MG/1
10 TABLET ORAL EVERY 4 HOURS PRN
Status: DISCONTINUED | OUTPATIENT
Start: 2017-09-13 | End: 2017-09-14

## 2017-09-13 RX ORDER — SODIUM CITRATE AND CITRIC ACID MONOHYDRATE 334; 500 MG/5ML; MG/5ML
30 SOLUTION ORAL
Status: DISCONTINUED | OUTPATIENT
Start: 2017-09-13 | End: 2017-09-13

## 2017-09-13 RX ORDER — ACETAMINOPHEN 325 MG/1
650 TABLET ORAL EVERY 6 HOURS
Status: COMPLETED | OUTPATIENT
Start: 2017-09-13 | End: 2017-09-14

## 2017-09-13 RX ORDER — ONDANSETRON 2 MG/ML
4 INJECTION INTRAMUSCULAR; INTRAVENOUS EVERY 8 HOURS PRN
Status: DISCONTINUED | OUTPATIENT
Start: 2017-09-13 | End: 2017-09-19 | Stop reason: HOSPADM

## 2017-09-13 RX ORDER — HYDROMORPHONE HYDROCHLORIDE 2 MG/ML
2 INJECTION, SOLUTION INTRAMUSCULAR; INTRAVENOUS; SUBCUTANEOUS ONCE
Status: DISCONTINUED | OUTPATIENT
Start: 2017-09-13 | End: 2017-09-13

## 2017-09-13 RX ORDER — ACETAMINOPHEN 10 MG/ML
INJECTION, SOLUTION INTRAVENOUS
Status: DISCONTINUED | OUTPATIENT
Start: 2017-09-13 | End: 2017-09-13

## 2017-09-13 RX ORDER — HYDROCORTISONE 25 MG/G
CREAM TOPICAL 3 TIMES DAILY PRN
Status: DISCONTINUED | OUTPATIENT
Start: 2017-09-13 | End: 2017-09-19 | Stop reason: HOSPADM

## 2017-09-13 RX ORDER — KETOROLAC TROMETHAMINE 30 MG/ML
INJECTION, SOLUTION INTRAMUSCULAR; INTRAVENOUS
Status: DISCONTINUED | OUTPATIENT
Start: 2017-09-13 | End: 2017-09-15

## 2017-09-13 RX ORDER — CARBOPROST TROMETHAMINE 250 UG/ML
250 INJECTION, SOLUTION INTRAMUSCULAR
Status: DISCONTINUED | OUTPATIENT
Start: 2017-09-13 | End: 2017-09-13

## 2017-09-13 RX ORDER — MIDAZOLAM HYDROCHLORIDE 2 MG/2ML
INJECTION, SOLUTION INTRAMUSCULAR; INTRAVENOUS
Status: DISCONTINUED | OUTPATIENT
Start: 2017-09-13 | End: 2017-09-15

## 2017-09-13 RX ORDER — AMOXICILLIN 250 MG
1 CAPSULE ORAL NIGHTLY PRN
Status: DISCONTINUED | OUTPATIENT
Start: 2017-09-13 | End: 2017-09-19 | Stop reason: HOSPADM

## 2017-09-13 RX ORDER — HEPARIN SODIUM 1000 [USP'U]/ML
INJECTION, SOLUTION INTRAVENOUS; SUBCUTANEOUS
Status: DISCONTINUED | OUTPATIENT
Start: 2017-09-13 | End: 2017-09-19 | Stop reason: HOSPADM

## 2017-09-13 RX ORDER — BUPIVACAINE HYDROCHLORIDE 7.5 MG/ML
INJECTION, SOLUTION EPIDURAL; RETROBULBAR
Status: DISCONTINUED | OUTPATIENT
Start: 2017-09-13 | End: 2017-09-13

## 2017-09-13 RX ORDER — NITROGLYCERIN 5 MG/ML
INJECTION, SOLUTION INTRAVENOUS
Status: DISCONTINUED | OUTPATIENT
Start: 2017-09-13 | End: 2017-09-15

## 2017-09-13 RX ADMIN — HEPARIN SODIUM 1000 UNITS: 1000 INJECTION, SOLUTION INTRAVENOUS; SUBCUTANEOUS at 10:09

## 2017-09-13 RX ADMIN — OXYCODONE HYDROCHLORIDE 10 MG: 5 TABLET ORAL at 07:09

## 2017-09-13 RX ADMIN — HYDROMORPHONE HYDROCHLORIDE 0.5 MG: 2 INJECTION INTRAMUSCULAR; INTRAVENOUS; SUBCUTANEOUS at 08:09

## 2017-09-13 RX ADMIN — FENTANYL CITRATE 50 MCG: 50 INJECTION, SOLUTION INTRAMUSCULAR; INTRAVENOUS at 10:09

## 2017-09-13 RX ADMIN — DOCUSATE SODIUM 200 MG: 100 CAPSULE, LIQUID FILLED ORAL at 09:09

## 2017-09-13 RX ADMIN — MIDAZOLAM HYDROCHLORIDE 1 MG: 1 INJECTION, SOLUTION INTRAMUSCULAR; INTRAVENOUS at 10:09

## 2017-09-13 RX ADMIN — OXYCODONE HYDROCHLORIDE 10 MG: 5 TABLET ORAL at 03:09

## 2017-09-13 RX ADMIN — PHENYLEPHRINE HYDROCHLORIDE 100 MCG: 10 INJECTION INTRAVENOUS at 08:09

## 2017-09-13 RX ADMIN — MIDAZOLAM HYDROCHLORIDE 1 MG: 1 INJECTION, SOLUTION INTRAMUSCULAR; INTRAVENOUS at 09:09

## 2017-09-13 RX ADMIN — HEPARIN SODIUM 1000 UNITS: 1000 INJECTION, SOLUTION INTRAVENOUS; SUBCUTANEOUS at 09:09

## 2017-09-13 RX ADMIN — SODIUM CITRATE AND CITRIC ACID MONOHYDRATE 30 ML: 500; 334 SOLUTION ORAL at 07:09

## 2017-09-13 RX ADMIN — HYDROMORPHONE HYDROCHLORIDE 1 MG: 2 INJECTION INTRAMUSCULAR; INTRAVENOUS; SUBCUTANEOUS at 01:09

## 2017-09-13 RX ADMIN — ACETAMINOPHEN 1000 MG: 10 INJECTION, SOLUTION INTRAVENOUS at 09:09

## 2017-09-13 RX ADMIN — SODIUM CHLORIDE, SODIUM LACTATE, POTASSIUM CHLORIDE, AND CALCIUM CHLORIDE: 600; 310; 30; 20 INJECTION, SOLUTION INTRAVENOUS at 07:09

## 2017-09-13 RX ADMIN — NITROGLYCERIN 200 MCG: 5 INJECTION, SOLUTION INTRAVENOUS at 10:09

## 2017-09-13 RX ADMIN — SODIUM CHLORIDE 2 G: 9 INJECTION, SOLUTION INTRAVENOUS at 08:09

## 2017-09-13 RX ADMIN — SODIUM CHLORIDE: 0.9 INJECTION, SOLUTION INTRAVENOUS at 02:09

## 2017-09-13 RX ADMIN — KETOROLAC TROMETHAMINE 30 MG: 30 INJECTION, SOLUTION INTRAMUSCULAR; INTRAVENOUS at 10:09

## 2017-09-13 RX ADMIN — SODIUM CHLORIDE, SODIUM LACTATE, POTASSIUM CHLORIDE, AND CALCIUM CHLORIDE: .6; .31; .03; .02 INJECTION, SOLUTION INTRAVENOUS at 07:09

## 2017-09-13 RX ADMIN — MIDAZOLAM HYDROCHLORIDE 1 MG: 1 INJECTION, SOLUTION INTRAMUSCULAR; INTRAVENOUS at 08:09

## 2017-09-13 RX ADMIN — SODIUM CHLORIDE, SODIUM LACTATE, POTASSIUM CHLORIDE, AND CALCIUM CHLORIDE: 600; 310; 30; 20 INJECTION, SOLUTION INTRAVENOUS at 08:09

## 2017-09-13 RX ADMIN — ACETAMINOPHEN 650 MG: 325 TABLET ORAL at 03:09

## 2017-09-13 RX ADMIN — HYDROMORPHONE HYDROCHLORIDE 1 MG: 2 INJECTION INTRAMUSCULAR; INTRAVENOUS; SUBCUTANEOUS at 11:09

## 2017-09-13 RX ADMIN — FENTANYL CITRATE 10 MCG: 50 INJECTION, SOLUTION INTRAMUSCULAR; INTRAVENOUS at 08:09

## 2017-09-13 RX ADMIN — ONDANSETRON 4 MG: 2 INJECTION INTRAMUSCULAR; INTRAVENOUS at 12:09

## 2017-09-13 RX ADMIN — BUPIVACAINE HYDROCHLORIDE 1.6 ML: 7.5 INJECTION, SOLUTION EPIDURAL; RETROBULBAR at 08:09

## 2017-09-13 RX ADMIN — NITROGLYCERIN 200 MCG: 5 INJECTION, SOLUTION INTRAVENOUS at 09:09

## 2017-09-13 RX ADMIN — MIDAZOLAM HYDROCHLORIDE 2 MG: 1 INJECTION, SOLUTION INTRAMUSCULAR; INTRAVENOUS at 08:09

## 2017-09-13 RX ADMIN — ACETAMINOPHEN 650 MG: 325 TABLET ORAL at 11:09

## 2017-09-13 RX ADMIN — KETOROLAC TROMETHAMINE 30 MG: 30 INJECTION, SOLUTION INTRAMUSCULAR at 11:09

## 2017-09-13 RX ADMIN — PROMETHAZINE HYDROCHLORIDE 6.25 MG: 25 INJECTION INTRAMUSCULAR; INTRAVENOUS at 02:09

## 2017-09-13 RX ADMIN — KETOROLAC TROMETHAMINE 30 MG: 30 INJECTION, SOLUTION INTRAMUSCULAR at 04:09

## 2017-09-13 RX ADMIN — ONDANSETRON 4 MG: 2 INJECTION, SOLUTION INTRAMUSCULAR; INTRAVENOUS at 08:09

## 2017-09-13 RX ADMIN — FAMOTIDINE 20 MG: 10 INJECTION INTRAVENOUS at 07:09

## 2017-09-13 NOTE — ANESTHESIA PREPROCEDURE EVALUATION
"                                                                                                             2017  Louisa Morrow is a 32 y.o. female presents for c-sections. History significant for placenta accreta.    OB History    Para Term  AB Living   4 2 2   1 1   SAB TAB Ectopic Multiple Live Births   1       1      # Outcome Date GA Lbr Nixon/2nd Weight Sex Delivery Anes PTL Lv   4 Current            3 Term 13 39w0d  3.525 kg (7 lb 12.3 oz) F CS-LTranv Spinal N FREDY      Birth Comments: BIRTH/DELIVERY NOTE:  28 YO  to  Mom w/ + PNC, - PNL, - GBS. 39-0/7 WGA. Repeat . PMH/GYN HX for Bacterial Vaginosis and Smoking. PNV during the pregnancy and TX for BV. Infant w/ spontaneous cry at delivery. AROM w/ Clear Fluids at Delivery. APGARS: 9 (-1 for color) &10. Three Vessel Cord. NO Void or Stool. Stimulation and Bulb Suctioning, only. Moved to  Nursery doing Well.   2 Term 2009   3.345 kg (7 lb 6 oz) M CS-LTranv      1 SAB                   Wt Readings from Last 1 Encounters:   17 0927 88.4 kg (194 lb 14.2 oz)       BP Readings from Last 3 Encounters:   17 116/63   17 121/68   17 118/80       Patient Active Problem List   Diagnosis    Elective surgery    Placenta previa antepartum in second trimester    Placenta accreta in third trimester    Placenta accreta       Past Surgical History:   Procedure Laterality Date     SECTION      L Arm surgery  2007    Primary Cesarian  2010       Social History     Social History    Marital status: Single     Spouse name: N/A    Number of children: N/A    Years of education: N/A     Occupational History    Not on file.     Social History Main Topics    Smoking status: Former Smoker     Types: Cigarettes     Quit date: 2016    Smokeless tobacco: Never Used    Alcohol use No    Drug use: No      Comment: +THC on 3/13/2017, states "has smoked since then"    Sexual activity: " Yes     Partners: Male     Other Topics Concern    Not on file     Social History Narrative    No narrative on file         Chemistry    No results found for: NA, K, CL, CO2, BUN, CREATININE, GLU No results found for: CALCIUM, ALKPHOS, AST, ALT, BILITOT, ESTGFRAFRICA, EGFRNONAA         Lab Results   Component Value Date    WBC 16.70 (H) 09/11/2017    HGB 13.8 09/11/2017    HCT 41.7 09/11/2017    MCV 94 09/11/2017     09/11/2017       No results for input(s): INR, PROTIME, APTT in the last 72 hours.    Invalid input(s): PT      Anesthesia Evaluation    I have reviewed the Patient Summary Reports.    I have reviewed the Nursing Notes.   I have reviewed the Medications.     Review of Systems  Anesthesia Hx:  History of prior surgery of interest to airway management or planning: Denies Family Hx of Anesthesia complications.   Denies Personal Hx of Anesthesia complications.       Physical Exam  General:  Well nourished    Airway/Jaw/Neck:  Airway Findings: Mouth Opening: Normal Tongue: Normal  General Airway Assessment: Adult  Mallampati: II  Improves to I with phonation.  TM Distance: Normal, at least 6 cm        Eyes/Ears/Nose:  EYES/EARS/NOSE FINDINGS: Normal   Dental:  DENTAL FINDINGS: Normal   Chest/Lungs:  Chest/Lungs Clear    Heart/Vascular:  Heart Findings: Normal       Mental Status:  Mental Status Findings: Normal        Anesthesia Plan  Type of Anesthesia, risks & benefits discussed:  Anesthesia Type:  CSE, general  Patient's Preference:   Intra-op Monitoring Plan:   Intra-op Monitoring Plan Comments:   Post Op Pain Control Plan:   Post Op Pain Control Plan Comments:   Induction:   IV  Beta Blocker:  Patient is not currently on a Beta-Blocker (No further documentation required).       Informed Consent: Patient understands risks and agrees with Anesthesia plan.  Questions answered. Anesthesia consent signed with patient.  ASA Score: 2     Day of Surgery Review of History & Physical:            Ready  For Surgery From Anesthesia Perspective.

## 2017-09-13 NOTE — HPI
Louisa Morrow is a 32 y.o.  female with IUP at 34w0d gestation who is admitted for scheduled  delivery secondary to placenta previa with anterior placenta accreta. Patient has a history of 2 prior  deliveries. Planning to leave the placenta insitu if possible followed by UAE, then interval hysterectomy.    Patient has no questions this morning and fully understands plan. She has no complaints. Patient denies contractions, denies vaginal bleeding, denies LOF.   Fetal Movement: normal.

## 2017-09-13 NOTE — ANESTHESIA PROCEDURE NOTES
CSE    Patient location during procedure: OR  Start time: 9/13/2017 8:10 AM  Timeout: 9/13/2017 8:10 AM  End time: 9/13/2017 8:21 AM  Staffing  Anesthesiologist: SHRADDHA ZELAYA  Resident/CRNA: MIKE FRANCES JR.  Performed: resident/CRNA   Preanesthetic Checklist  Completed: patient identified, site marked, surgical consent, pre-op evaluation, timeout performed, IV checked, risks and benefits discussed and monitors and equipment checked  CSE  Patient position: sitting  Prep: ChloraPrep  Patient monitoring: heart rate, continuous pulse ox and frequent blood pressure checks  Approach: midline  Spinal Needle  Needle type: pencil-tip   Needle gauge: 25 G  Needle length: 5 in  Epidural Needle  Injection technique: RADHA air  Needle type: Tuohy   Needle gauge: 17 G  Needle length: 3.5 in  Needle insertion depth: 5.5 cm  Location: L4-5  Needle localization: anatomical landmarks  Catheter  Catheter type: springwound  Catheter size: 19 G  Catheter at skin depth: 9.5 cm  Assessment  Sensory level: T4   Dermatomal levels determined by pinch or prick  Intrathecal Medications:  Bolus administered: 1.6 mL of 0.75 bupivacaine  administered: primary anesthetic and 10 mcg of  fentanyl

## 2017-09-13 NOTE — H&P
[facility]  Obstetrics  History & Physical    Patient Name: Louisa Morrow  MRN: [id number]  Admission Date: 2017  Primary Care Provider: Waylon Chery MD    Subjective:     Principal Problem:Placenta accreta    History of Present Illness:   Louisa Morrow is a 32 y.o.  female with IUP at 34w0d gestation who is admitted for scheduled  delivery secondary to placenta previa with anterior placenta accreta. Patient has a history of 2 prior  deliveries. Planning to leave the placenta insitu if possible followed by UAE, then interval hysterectomy.    Patient has no questions this morning and fully understands plan. She has no complaints. Patient denies contractions, denies vaginal bleeding, denies LOF.   Fetal Movement: normal.         Obstetric History       T2      L1     SAB0   TAB0   Ectopic0   Multiple0   Live Births1       # Outcome Date GA Lbr Nixon/2nd Weight Sex Delivery Anes PTL Lv   4 Current            3 Term 13 39w0d  3.525 kg (7 lb 12.3 oz) F CS-LTranv Spinal N FREDY      Name: LAYA,BABY GIRL       Apgar1:  9               Apgar5: 10   2 Term    3.345 kg (7 lb 6 oz) M CS-LTranv      1 SAB                 Past Medical History:   Diagnosis Date    BV (bacterial vaginosis)     Migraines      Past Surgical History:   Procedure Laterality Date     SECTION      L Arm surgery      Primary Cesarian  2010       Facility-Administered Medications Prior to Admission   Medication    betamethasone acetate-betamethasone sodium phosphate injection 12 mg     PTA Medications   Medication Sig    PNV,CALCIUM 72/IRON/FOLIC ACID (PRENATAL VITAMIN) Tab Take 1 tablet by mouth once daily.    promethazine (PHENERGAN) 25 MG tablet Take 25 mg by mouth every 4 (four) hours.       Review of patient's allergies indicates:  No Known Allergies     Family History     Problem Relation (Age of Onset)    Cancer Paternal Grandmother     "Lung cancer Maternal Grandfather        Social History Main Topics    Smoking status: Former Smoker     Types: Cigarettes     Quit date: 07/2016    Smokeless tobacco: Never Used    Alcohol use No    Drug use: No      Comment: +THC on 3/13/2017, states "has smoked since then"    Sexual activity: Yes     Partners: Male     Review of Systems   Constitutional: Negative for activity change, appetite change, fatigue and fever.   Respiratory: Negative for cough and shortness of breath.    Cardiovascular: Negative for chest pain and palpitations.   Gastrointestinal: Negative for abdominal pain, constipation, diarrhea, nausea and vomiting.   Genitourinary: Negative for dysuria, frequency, pelvic pain, vaginal bleeding and vaginal discharge.   Neurological: Negative for headaches.   Psychiatric/Behavioral: Negative for depression. The patient is not nervous/anxious.    Breast: Negative for breast mass and breast pain     Objective:     Vital Signs (Most Recent):    Vital Signs (24h Range):           There is no height or weight on file to calculate BMI.    FHT: 140bpm Cat 1 (reassuring)  TOCO: irreg    Physical Exam:   Constitutional: She is oriented to person, place, and time. She appears well-developed and well-nourished.    HENT:   Head: Normocephalic and atraumatic.     Neck: Normal range of motion.    Cardiovascular: Normal rate, regular rhythm and normal heart sounds.     Pulmonary/Chest: Effort normal and breath sounds normal.        Abdominal: Soft. Bowel sounds are normal. She exhibits no distension. There is no tenderness.   Gravid 34w             Musculoskeletal: Normal range of motion and moves all extremeties.       Neurological: She is alert and oriented to person, place, and time.    Skin: Skin is warm and dry.    Psychiatric: She has a normal mood and affect.       Cervix:deferred     Significant Labs:  Lab Results   Component Value Date    GROUPTR O POS 09/11/2017    HEPBSAG Negative 11/30/2012    " STREPBCULT Negative 2013     Lab Results   Component Value Date    WBC 16.70 (H) 2017    HGB 13.8 2017    HCT 41.7 2017    MCV 94 2017     2017       I have personallly reviewed all pertinent lab results from the last 24 hours.    Assessment/Plan:     32 y.o. female  at 34w0d for:    * Placenta accreta    - Consents signed and to chart - Blood, c/s, hysterectomy, ExLap  - Admit to Labor and Delivery unit  - Epidural per Anesthesia  - T&S done 2 days ago, 4u pRBC held, blood bank notified of anticipation of potential high volume blood loss  - Ancef OCTOR  - To OR for C/S --> will be done in MAIN OR    Plan to attempt delivery followed by closure of hysterotomy with placenta left insitu   Patient will then be taken to IR for UAE (IR consult in place)   Patient was already seen by Dr. Small, Dr. Edwards, and Dr. Palmer  - Ultrasound performed, infant in vertex position.   - Post-Partum Hemorrhage risk - high        34 weeks gestation of pregnancy    - s/p BMZ -  - NICU will be present at time of delivery for resuscitation            Tonie Lawson MD  Obstetrics  [facility]

## 2017-09-13 NOTE — SUBJECTIVE & OBJECTIVE
"  Obstetric History       T2      L1     SAB0   TAB0   Ectopic0   Multiple0   Live Births1       # Outcome Date GA Lbr Nixon/2nd Weight Sex Delivery Anes PTL Lv   4 Current            3 Term 13 39w0d  3.525 kg (7 lb 12.3 oz) F CS-LTranv Spinal N FREDY      Name: LAYABABY GIRL       Apgar1:  9               Apgar5: 10   2 Term    3.345 kg (7 lb 6 oz) M CS-LTranv      1 SAB                 Past Medical History:   Diagnosis Date    BV (bacterial vaginosis)     Migraines      Past Surgical History:   Procedure Laterality Date     SECTION      L Arm surgery      Primary Cesarian         Facility-Administered Medications Prior to Admission   Medication    betamethasone acetate-betamethasone sodium phosphate injection 12 mg     PTA Medications   Medication Sig    PNV,CALCIUM 72/IRON/FOLIC ACID (PRENATAL VITAMIN) Tab Take 1 tablet by mouth once daily.    promethazine (PHENERGAN) 25 MG tablet Take 25 mg by mouth every 4 (four) hours.       Review of patient's allergies indicates:  No Known Allergies     Family History     Problem Relation (Age of Onset)    Cancer Paternal Grandmother    Lung cancer Maternal Grandfather        Social History Main Topics    Smoking status: Former Smoker     Types: Cigarettes     Quit date: 2016    Smokeless tobacco: Never Used    Alcohol use No    Drug use: No      Comment: +THC on 3/13/2017, states "has smoked since then"    Sexual activity: Yes     Partners: Male     Review of Systems   Constitutional: Negative for activity change, appetite change, fatigue and fever.   Respiratory: Negative for cough and shortness of breath.    Cardiovascular: Negative for chest pain and palpitations.   Gastrointestinal: Negative for abdominal pain, constipation, diarrhea, nausea and vomiting.   Genitourinary: Negative for dysuria, frequency, pelvic pain, vaginal bleeding and vaginal discharge.   Neurological: Negative for headaches. "   Psychiatric/Behavioral: Negative for depression. The patient is not nervous/anxious.    Breast: Negative for breast mass and breast pain     Objective:     VSS, afebrile per nursing, see EPIC    Weight: 88.4 kg (194 lb 14.2 oz)  Body mass index is 29.64 kg/m².    FHT: 140bpm Cat 1 (reassuring)  TOCO: irreg    Physical Exam:   Constitutional: She is oriented to person, place, and time. She appears well-developed and well-nourished.    HENT:   Head: Normocephalic and atraumatic.     Neck: Normal range of motion.    Cardiovascular: Normal rate, regular rhythm and normal heart sounds.     Pulmonary/Chest: Effort normal and breath sounds normal.        Abdominal: Soft. Bowel sounds are normal. She exhibits no distension. There is no tenderness.   Gravid 34w             Musculoskeletal: Normal range of motion and moves all extremeties.       Neurological: She is alert and oriented to person, place, and time.    Skin: Skin is warm and dry.    Psychiatric: She has a normal mood and affect.     US: Transverse presentation, head maternal left, back down    Cervix:deferred     Significant Labs:  Lab Results   Component Value Date    GROUPTRH O POS 09/11/2017    HEPBSAG Negative 11/30/2012    STREPBCULT Negative 05/08/2013     Lab Results   Component Value Date    WBC 16.70 (H) 09/11/2017    HGB 13.8 09/11/2017    HCT 41.7 09/11/2017    MCV 94 09/11/2017     09/11/2017       I have personallly reviewed all pertinent lab results from the last 24 hours.

## 2017-09-13 NOTE — OR NURSING
Pt arrived to PACU with radial compression armband intact with 14 cc of air;  At 1200 protocol was started to remove 2 cc of air q 3 min until all 14 cc of air was removed; band was removed and pressure dressing was applied to patient's left wrist;  Patient tolerated procedure well; will continue to monitor

## 2017-09-13 NOTE — L&D DELIVERY NOTE
Certification of Assistant at Surgery       Surgery Date: 2017     Participating Surgeons:  Surgeon(s) and Role:     * Garret Small MD - Primary     * Ismael Edwards MD - Assisting     * Tonie Lawson MD - Resident - Assisting    Procedures:  Procedure(s) (LRB):  DELIVERY- SECTION (N/A)    Assistant Surgeon's Certification of Necessity:  I understand that section 1842 (b) (6) (d) of the Social Security Act generally prohibits Medicare Part B reasonable charge payment for the services of assistants at surgery in [facility] hospitals when qualified residents are available to furnish such services. I certify that the services for which payment is claimed were medically necessary, and that no qualified resident was available to perform the services. I further understand that these services are subject to post-payment review by the Medicare carrier.      Garret Small MD    2017  10:49 AM   Section Procedure Note    Procedure: Classical  Section via vertical skin incision    Indications: 32 y.o.  at 34w0d with known placenta previa with accreta.    Pre-operative Diagnosis:   1. IUP at 34 week 0 day pregnancy  2. Placenta previa with accreta  3. Transverse fetal presentation    Post-operative Diagnosis:   1. IUP at 34 week 0 days  2. Placenta previa with accreta, morbidly adherent placenta  3. Transverse fetal presentation    Surgeon: Dr. Edwards, Dr. Small     Assistants: Tonie Lawson, PGY4    Anesthesia: CSE    Findings:    1. Single viable  male infant, with APGARS 9/9, weight 2250g.  2. Dense adhesions of lower uterine segment and bladder to anterior abdominal wall  3. Placenta vessels noted through anterior lower uterine segment on the right side, no parametrial involvement  4. Placenta left in situ, cord suture ligated    Estimated Blood Loss:  500 mL           Total IV Fluids: 1500 mL     UOP: 500 mL    Specimens: None    PreOp CBC:   Lab Results   Component Value  Date    WBC 17.58 (H) 2017    HGB 12.7 2017    HCT 38.0 2017    MCV 92 2017     (L) 2017                     Complications:  None; patient tolerated the procedure well.           Disposition: Transferred to IR suite for UAE followed by recovery           Condition: stable    Procedure Details   The patient was seen in the Holding Room. The risks, benefits, complications, treatment options, and expected outcomes were discussed with the patient.  The patient concurred with the proposed plan, giving informed consent.  The site of surgery properly noted. The patient was taken to Operating Room, identified as Louisa Morrow and the procedure verified as Classical , possible hysterectomy. A Time Out was held and the above information confirmed.    After placement of regional anesthesia, the patient was prepped and draped in the dorsal lithotomy positioin.  A marie catheter was also placed. Preoperative antibiotics were administered and an allis test was performed yielding adequate anesthesia.  A vertical skin incision was made and carried down through the subcutaneous tissue to the fascia. Fascial incision was made and extended superiorly and inferiorly. The peritoneum was identified found to be free of adherent bowel and entered sharply. Peritoneal incision was extended longitudinally. The vesico-uterine peritoneum was identified and bladder blade was inserted gently, as dense bladder adhesions were noted as well as anterior placental vessels. A classical uterine incision was made with knife and extended with bandage scissors. The amniotic sac was ruptured and the infant was noted to be in transverse position. The feet were brought to the incision and delivered via breech extraction in the usual atraumatic fashion. The patient delivered a single viable male infant without difficulty.  Infant weighed 2250 grams with Apgar scores of 9/9 at one and five minutes  respectively. After the umbilical cord was clamped and cut, cord blood was obtained for evaluation. The umbilical cord was ligated with 2-0 chromic and the placenta was left in situ. The uterine outline, tubes and ovaries appeared normal. At the anterior right side of the lower uterine segment, placenta vessels were seen through the serosa suggesting a placenta increta. No parametrial involvement was noted. The uterine incision was closed with running locked sutures of 0 chromic in 2 layers. One extra hemostatic figure of eight suture was required in the midpoint of the incision. Hemostasis was observed. Fibrillar and seprafilm were placed over the hysterotomy. The fascia was then reapproximated with running sutures of 0 PDS. The skin was reapproximated with staples.    Instrument, sponge, and needle counts were correct prior the abdominal closure and at the conclusion of the case.     Pt tolerated procedure well and was in stable condition after the procedure. She was then transferred to  for UAE.      Tonie Lawson M.D.  PGY-4 OB/GYN         Delivery Information for  Peter Morrow    Birth information:  YOB: 2017   Time of birth: 8:43 AM   Sex: male   Head Delivery Date/Time: 2017  9:55 AM   Delivery type: , Classical   Gestational Age: 34w0d    Delivery Providers    Delivering clinician:  Ismael Edwards MD   Other personnel:   Provider Role   MD Tonie Garcia MD Jennifer J Moran, RN    Gracie Elam, CST                Energy Measurements    Weight:  2250 g           Energy Assessment    Living status:  Living  Apgars:     1 Minute:   5 Minute:   10 Minute:   15 Minute:   20 Minute:     Skin Color:   1  1       Heart Rate:   2  2       Reflex Irritability:   2  2       Muscle Tone:   2  2       Respiratory Effort:   2  2       Total:   9  9               Apgars Assigned By:  NICU         Assisted Delivery Details:    Forceps attempted?:  No  Vacuum  extractor attempted?:  No         Shoulder Dystocia    Shoulder dystocia present?:  No           Presentation and Position    Presentation:   Footling Breech   Position:   Middle                Interventions/Resuscitation    Method:  NICU Attended       Cord    Vessels:  3 vessels  Complications:  None  Delayed Cord Clamping?:  No  Cord Blood Disposition:  Sent with Baby  Gases Sent?:  Yes  Stem Cell Collection (by MD):  No             Labor Events:       labor: No     Labor Onset Date/Time:         Dilation Complete Date/Time:         Start Pushing Date/Time:       Rupture Date/Time:              Rupture type:           Fluid Amount:        Fluid Color:        Fluid Odor:        Membrane Status (PeriCalm):        Rupture Date/Time (PeriCalm):        Fluid Amount (PeriCalm):        Fluid Color (PeriCalm):         steroids: Full Course     Antibiotics given for GBS: No     Induction: none     Indications for induction:        Augmentation:       Indications for augmentation:       Labor complications: None     Additional complications:          Cervical ripening:                     Delivery:      Episiotomy: None     Indication for Episiotomy:       Perineal Lacerations: None Repaired:      Periurethral Laceration: none Repaired:     Labial Laceration: none Repaired:     Sulcus Laceration: none Repaired:     Vaginal Laceration: No Repaired:     Cervical Laceration: No Repaired:     Repair suture:       Repair # of packets:       Vaginal delivery QBL (mL): 0      QBL (mL): 0     Combined Blood Loss (mL): 0     Vaginal Sweep Performed: No     Surgicount Correct: Yes       Other providers:       Anesthesia    Method:  Spinal, Epidural          Details (if applicable):  Trial of Labor No    Categorization: Repeat    Priority: Routine   Indications for : Other (Add Comments)   Incision Type: classical     Additional  information:  Forceps:    Vacuum:    Breech:     Observed anomalies    Other (Comments):         I was personally present, scrubbed, and performed and participated and performed all portions of this procedure.     Ismael Edwards Jr., MD  Maternal Fetal Medicine

## 2017-09-13 NOTE — NURSING
Bilateral uterine embolization performed post c/s. Pt tolerated well. With c/o abd pain at an 8 post procedure. Dilaudid 1 mg given ivp. Pt transferred to Pacu per stretcher in stable condition. Pt aaox4. Attempted to contact s.o.Sabino. However, he was not in icu waiting area and did not answer phone. Report given at bedside.

## 2017-09-13 NOTE — ANESTHESIA PROCEDURE NOTES
Arterial line left    Diagnosis: Placenta accreta     Patient location during procedure: done in OR  Procedure start time: 9/13/2017 8:30 AM  Timeout: 9/13/2017 8:30 AM  Procedure end time: 9/13/2017 8:36 AM  Staffing  Anesthesiologist: SHRADDHA ZELAYA  Resident/CRNA: MIKE FRANCES JR.  Performed: resident/CRNA   Anesthesiologist was present at the time of the procedure.  Preanesthetic Checklist  Completed: patient identified, site marked, surgical consent, pre-op evaluation, timeout performed, IV checked, risks and benefits discussed, monitors and equipment checked and anesthesia consent givenArterial line left  Skin Prep: chlorhexidine gluconate  Local Infiltration: lidocaine  Orientation: left  Location: radial  Catheter Size: 20 G  Catheter placement by Anatomical landmarks. Heme positive aspiration all ports.Insertion Attempts: 1  Assessment  Dressing: secured with tape and tegaderm  Patient: Tolerated well

## 2017-09-13 NOTE — SUBJECTIVE & OBJECTIVE
Hospital course: 2017 - Admitted for scheduled  delivery at 34w0d. Classical  delivery performed, placenta left in situ. EBL 500cc. Patient taken to IR suite where UAE was performed without complication. Stable post-op.    Interval History:   Patient just arrived to floor from recovery. She complains of nausea and fear of vomiting secondary to abdominal incision. Minimal relief with zofran, currently receiving phenergan. She had a few sips of water. No emesis yet. Pain is otherwise moderate and controlled with meds. Schofield in place. She has not yet ambulated. She has not passed flatus, and has not a BM. Vaginal bleeding is minimal. She denies fever or chills. She is not breastfeeding.    Objective:     Vital Signs (Most Recent):  Temp: 97.9 °F (36.6 °C) (17 1119)  Pulse: 81 (17 1230)  Resp: 16 (17 1135)  BP: (!) 111/59 (17 1220)  SpO2: 95 % (17 1230) Vital Signs (24h Range):  Temp:  [97.1 °F (36.2 °C)-97.9 °F (36.6 °C)] 97.9 °F (36.6 °C)  Pulse:  [71-99] 81  Resp:  [16-18] 16  SpO2:  [95 %-100 %] 95 %  BP: (105-122)/(51-77) 111/59     Weight: 88.4 kg (194 lb 14.2 oz)  Body mass index is 29.64 kg/m².      Intake/Output Summary (Last 24 hours) at 17 1428  Last data filed at 17 1256   Gross per 24 hour   Intake             1400 ml   Output              500 ml   Net              900 ml       Significant Labs:  Lab Results   Component Value Date    GROUPTRH O POS 2017    HEPBSAG Negative 2012    STREPBCULT Negative 2013       CBC/Anemia Labs: Coags:      Recent Labs  Lab 17  1020 17  1200   WBC 16.70* 17.58*   HGB 13.8 12.7   HCT 41.7 38.0    140*   MCV 94 92   RDW 13.6 13.4      Recent Labs  Lab 17  1200   INR 0.9   APTT 33.8*            I have personallly reviewed all pertinent lab results from the last 24 hours.    Physical Exam:   Constitutional: She is oriented to person, place, and time. She appears  well-developed and well-nourished.    HENT:   Head: Normocephalic and atraumatic.     Neck: Normal range of motion.    Cardiovascular: Normal rate, regular rhythm and normal heart sounds.     Pulmonary/Chest: Effort normal and breath sounds normal.        Abdominal: Soft. Bowel sounds are normal. She exhibits abdominal incision (dressing in place, small amount of drainage on bandage, marked). She exhibits no distension. There is no tenderness.   Fundus firm, below umbilicus     Genitourinary:   Genitourinary Comments: No vaginal bleeding noted on pad           Musculoskeletal: Normal range of motion and moves all extremeties. She exhibits no edema.   KELLY/SCDs in place       Neurological: She is alert and oriented to person, place, and time.    Skin: Skin is warm and dry.    Psychiatric: She has a normal mood and affect.

## 2017-09-13 NOTE — HOSPITAL COURSE
2017 - Admitted for scheduled  delivery at 34w0d. Classical  delivery performed, placenta left in situ. EBL 500cc. Patient taken to IR suite where UAE was performed without complication. Stable post-op.  2017 - Stable POD#1, no acute events.  09/15/2017 - POD#2, meeting milestones, working on pain control  2017 - POD#3,  Meeting milestones.   2017 - POD#4, patient had acute episode of bleeding overnight, filling 1/2 pad. Bright red blood was seen pooling in vaginal vault. She was placed on pad counts and Gyn Onc staff was notified. Bleeding then stopped overnight and she did not have any bleeding in the morning.   2017 - POD#5. Doing well VB decreased to old brown blood. Meeting all post-op milestones, awaiting BM.  2017- POD#6. VB minimal. Meeting post operative milestones.

## 2017-09-13 NOTE — ASSESSMENT & PLAN NOTE
- Consents signed and to chart - Blood, c/s, hysterectomy, ExLap  - Admit to Labor and Delivery unit  - Epidural per Anesthesia  - T&S done 2 days ago, 4u pRBC held, blood bank notified of anticipation of potential high volume blood loss  - Ancef OCTOR  - To OR for C/S --> will be done in MAIN OR    Plan to attempt delivery followed by closure of hysterotomy with placenta left insitu   Patient will then be taken to IR for UAE (IR consult in place)   Patient was already seen by Dr. Small, Dr. Edwards, and Dr. Palmer  - Ultrasound performed, infant in transverse, head maternal left, back down   - Post-Partum Hemorrhage risk - high

## 2017-09-13 NOTE — PROCEDURES
Radiology Post-Procedure Note    Pre Op Diagnosis: placenta accreta  Post Op Diagnosis: Same    Procedure: B UAE     Procedure performed by: Maikel Palmer MD    Written Informed Consent Obtained: Yes  Specimen Removed: NO  Estimated Blood Loss: Minimal    Findings:   Successful B uterine artery embolization for placenta accreta.    Patient tolerated procedure well.    @SIG@

## 2017-09-13 NOTE — CONSULTS
Consult/H&P Note  Interventional Radiology    Consult Requested By: JONG    Reason for Consult: placenta accreta    SUBJECTIVE:     Chief Complaint: placenta accreta    History of Present Illness: 33 yo F 34 weeks gestation with placenta accreta.  Planning for delivery via C section today, with placenta left in situ; then to IR for placental embolization.    Past Medical History:   Diagnosis Date    BV (bacterial vaginosis)     Migraines      Past Surgical History:   Procedure Laterality Date     SECTION      L Arm surgery      Primary Cesarian  2010     Family History   Problem Relation Age of Onset    Lung cancer Maternal Grandfather     Cancer Paternal Grandmother      Social History   Substance Use Topics    Smoking status: Former Smoker     Types: Cigarettes     Quit date: 2016    Smokeless tobacco: Never Used    Alcohol use No       Review of Systems:  As per admit H&P      OBJECTIVE:     Vital Signs Range (Last 24H):  Temp:  [97.1 °F (36.2 °C)]   Pulse:  [93-99]   Resp:  [18]   BP: (109-122)/(63-75)     Physical Exam:  General- Patient alert and oriented x3 in NAD  ENT- PERRLA,  Neck- No masses  CV- Regular rate and rhythm  Resp-  No increased WOB  GI- Non tender/ gravid uterus  Extrem- No cyanosis, clubbing, edema.   Derm- No rashes, masses, or lesions noted  Neuro-  No focal deficits noted.     Physical Exam  Body mass index is 29.64 kg/m².    Scheduled Meds:    Continuous Infusions:    lactated Ringers 125 mL/hr at 17 0718    oxytocin in lactated ringers       PRN Meds:carboprost, ceFAZolin (ANCEF) IVPB, citric acid-sodium citrate 500-334 mg/5 ml, famotidine (PF), lactated ringers, methylergonovine, misoprostol    Allergies: Review of patient's allergies indicates:  No Known Allergies    Labs:  No results for input(s): INR in the last 168 hours.    Invalid input(s):  PT,  PTT    Recent Labs  Lab 17  1020   WBC 16.70*   HGB 13.8   HCT 41.7   MCV 94       No  results for input(s): GLU, NA, K, CL, CO2, BUN, CREATININE, CALCIUM, MG, ALT, AST, ALBUMIN, BILITOT, BILIDIR in the last 168 hours.    Vitals (Most Recent):  Temp: 97.1 °F (36.2 °C) (09/13/17 0700)  Pulse: 99 (09/13/17 0730)  Resp: 18 (09/13/17 0700)  BP: 109/63 (09/13/17 0730)    ASA: 2  Mallampati: 2    Consent obtained    ASSESSMENT/PLAN:     Bilateral uterine artery embolization for placenta accreta following delivery.    Active Hospital Problems    Diagnosis  POA    *Partial placenta previa [O44.20]  Unknown     Added automatically from request for surgery [id number]      Placenta accreta [O43.219]  Yes    34 weeks gestation of pregnancy [Z3A.34]  Not Applicable    Placenta previa antepartum in second trimester [O44.02]  Yes      Resolved Hospital Problems    Diagnosis Date Resolved POA   No resolved problems to display.           Maikel Palmer MD

## 2017-09-13 NOTE — PROGRESS NOTES
[facility]  Obstetrics  Postpartum Progress Note    Patient Name: Louisa Morrow  MRN: [id number]  Admission Date: 2017  Hospital Length of Stay: 0 days  Attending Physician: Ismael Edwards MD  Primary Care Provider: Waylon Chery MD    Subjective:     Principal Problem: delivery delivered    Hospital course: 2017 - Admitted for scheduled  delivery at 34w0d. Classical  delivery performed, placenta left in situ. EBL 500cc. Patient taken to IR suite where UAE was performed without complication. Stable post-op.    Interval History:   Patient just arrived to floor from recovery. She complains of nausea and fear of vomiting secondary to abdominal incision. Minimal relief with zofran, currently receiving phenergan. She had a few sips of water. No emesis yet. Pain is otherwise moderate and controlled with meds. Schofield in place. She has not yet ambulated. She has not passed flatus, and has not a BM. Vaginal bleeding is minimal. She denies fever or chills. She is not breastfeeding.    Objective:     Vital Signs (Most Recent):  Temp: 97.9 °F (36.6 °C) (17 1119)  Pulse: 81 (17 1230)  Resp: 16 (17 1135)  BP: (!) 111/59 (17 1220)  SpO2: 95 % (17 1230) Vital Signs (24h Range):  Temp:  [97.1 °F (36.2 °C)-97.9 °F (36.6 °C)] 97.9 °F (36.6 °C)  Pulse:  [71-99] 81  Resp:  [16-18] 16  SpO2:  [95 %-100 %] 95 %  BP: (105-122)/(51-77) 111/59     Weight: 88.4 kg (194 lb 14.2 oz)  Body mass index is 29.64 kg/m².      Intake/Output Summary (Last 24 hours) at 17 1428  Last data filed at 17 1256   Gross per 24 hour   Intake             1400 ml   Output              500 ml   Net              900 ml     UOP: 150cc/hr over the past 3 hours    Significant Labs:  Lab Results   Component Value Date    GROUPTRH O POS 2017    HEPBSAG Negative 2012    STREPBCULT Negative 2013       CBC/Anemia Labs: Coags:      Recent Labs  Lab  17  1020 17  1200   WBC 16.70* 17.58*   HGB 13.8 12.7   HCT 41.7 38.0    140*   MCV 94 92   RDW 13.6 13.4      Recent Labs  Lab 17  1200   INR 0.9   APTT 33.8*            I have personallly reviewed all pertinent lab results from the last 24 hours.    Physical Exam:   Constitutional: She is oriented to person, place, and time. She appears well-developed and well-nourished.    HENT:   Head: Normocephalic and atraumatic.     Neck: Normal range of motion.    Cardiovascular: Normal rate, regular rhythm and normal heart sounds.     Pulmonary/Chest: Effort normal and breath sounds normal.        Abdominal: Soft. Bowel sounds are normal. She exhibits abdominal incision (dressing in place, small amount of drainage on bandage, marked). She exhibits no distension. There is no tenderness.   Fundus firm, below umbilicus     Genitourinary:   Genitourinary Comments: No vaginal bleeding noted on pad           Musculoskeletal: Normal range of motion and moves all extremeties. She exhibits no edema.   KELLY/SCDs in place       Neurological: She is alert and oriented to person, place, and time.    Skin: Skin is warm and dry.    Psychiatric: She has a normal mood and affect.       Assessment/Plan:     32 y.o. female  for:    *  delivery delivered    Postpartum care:  - Patient doing well. Continue routine management and advances.  - Continue IV and PO pain meds. Pain well controlled.  - complains of nausea --> zofran and phenergan prn, keep NPO for now, slowly advance once nausea resolves  - Encourage ambulation.   - Heme: Pre Delivery h/h  --> Immediately Post Delivery h/h   - Contraception - planning for interval hysterectomy  - Lactation - The patient is is NOT breast feeding  - Rh Status - positive          Placenta accreta    - S/p  delivery with placenta left in situ  - plan for interval hysterectomy in approx 4 weeks            Disposition: As patient meets milestones,  will plan to discharge once meeting post-op milestones..    Tonie Lawson MD  Obstetrics  [facility]

## 2017-09-13 NOTE — ASSESSMENT & PLAN NOTE
Postpartum care:  - Patient doing well. Continue routine management and advances.  - Continue IV and PO pain meds. Pain well controlled.  - complains of nausea --> zofran and phenergan prn, keep NPO for now, slowly advance once nausea resolves  - Encourage ambulation.   - Heme: Pre Delivery h/h 13/41 --> Immediately Post Delivery h/h 12/38  - Contraception - planning for interval hysterectomy  - Lactation - The patient is is NOT breast feeding  - Rh Status - positive

## 2017-09-14 LAB
APTT BLDCRRT: 33.5 SEC
BASOPHILS # BLD AUTO: 0.01 K/UL
BASOPHILS NFR BLD: 0.1 %
DIFFERENTIAL METHOD: ABNORMAL
EOSINOPHIL # BLD AUTO: 0.1 K/UL
EOSINOPHIL NFR BLD: 0.8 %
ERYTHROCYTE [DISTWIDTH] IN BLOOD BY AUTOMATED COUNT: 13.5 %
FIBRINOGEN PPP-MCNC: 387 MG/DL
HCT VFR BLD AUTO: 38.6 %
HGB BLD-MCNC: 12.9 G/DL
INR PPP: 1
LYMPHOCYTES # BLD AUTO: 0.9 K/UL
LYMPHOCYTES NFR BLD: 5.6 %
MCH RBC QN AUTO: 30.9 PG
MCHC RBC AUTO-ENTMCNC: 33.4 G/DL
MCV RBC AUTO: 93 FL
MONOCYTES # BLD AUTO: 1.8 K/UL
MONOCYTES NFR BLD: 11.2 %
NEUTROPHILS # BLD AUTO: 12.9 K/UL
NEUTROPHILS NFR BLD: 81.7 %
PLATELET # BLD AUTO: 140 K/UL
PMV BLD AUTO: 10.9 FL
PROTHROMBIN TIME: 10.7 SEC
RBC # BLD AUTO: 4.17 M/UL
WBC # BLD AUTO: 15.77 K/UL

## 2017-09-14 PROCEDURE — 63600175 PHARM REV CODE 636 W HCPCS: Performed by: RADIOLOGY

## 2017-09-14 PROCEDURE — 85610 PROTHROMBIN TIME: CPT

## 2017-09-14 PROCEDURE — 11000001 HC ACUTE MED/SURG PRIVATE ROOM

## 2017-09-14 PROCEDURE — 99232 SBSQ HOSP IP/OBS MODERATE 35: CPT | Mod: ,,, | Performed by: OBSTETRICS & GYNECOLOGY

## 2017-09-14 PROCEDURE — 36415 COLL VENOUS BLD VENIPUNCTURE: CPT

## 2017-09-14 PROCEDURE — 99900035 HC TECH TIME PER 15 MIN (STAT)

## 2017-09-14 PROCEDURE — 85384 FIBRINOGEN ACTIVITY: CPT

## 2017-09-14 PROCEDURE — 63600175 PHARM REV CODE 636 W HCPCS: Performed by: OBSTETRICS & GYNECOLOGY

## 2017-09-14 PROCEDURE — 25000003 PHARM REV CODE 250: Performed by: OBSTETRICS & GYNECOLOGY

## 2017-09-14 PROCEDURE — 85025 COMPLETE CBC W/AUTO DIFF WBC: CPT

## 2017-09-14 PROCEDURE — 85730 THROMBOPLASTIN TIME PARTIAL: CPT

## 2017-09-14 PROCEDURE — 25000003 PHARM REV CODE 250: Performed by: ANESTHESIOLOGY

## 2017-09-14 PROCEDURE — 94761 N-INVAS EAR/PLS OXIMETRY MLT: CPT

## 2017-09-14 RX ADMIN — OXYCODONE HYDROCHLORIDE 10 MG: 5 TABLET ORAL at 05:09

## 2017-09-14 RX ADMIN — PROMETHAZINE HYDROCHLORIDE 12.5 MG: 25 INJECTION INTRAMUSCULAR; INTRAVENOUS at 02:09

## 2017-09-14 RX ADMIN — ACETAMINOPHEN 650 MG: 325 TABLET ORAL at 11:09

## 2017-09-14 RX ADMIN — DOCUSATE SODIUM 200 MG: 100 CAPSULE, LIQUID FILLED ORAL at 08:09

## 2017-09-14 RX ADMIN — IBUPROFEN 600 MG: 600 TABLET, FILM COATED ORAL at 02:09

## 2017-09-14 RX ADMIN — OXYCODONE HYDROCHLORIDE 10 MG: 5 TABLET ORAL at 08:09

## 2017-09-14 RX ADMIN — HYDROCODONE BITARTRATE AND ACETAMINOPHEN 1 TABLET: 10; 325 TABLET ORAL at 07:09

## 2017-09-14 RX ADMIN — IBUPROFEN 600 MG: 600 TABLET, FILM COATED ORAL at 08:09

## 2017-09-14 RX ADMIN — KETOROLAC TROMETHAMINE 30 MG: 30 INJECTION, SOLUTION INTRAMUSCULAR at 04:09

## 2017-09-14 RX ADMIN — OXYCODONE HYDROCHLORIDE 10 MG: 5 TABLET ORAL at 04:09

## 2017-09-14 RX ADMIN — OXYCODONE HYDROCHLORIDE 10 MG: 5 TABLET ORAL at 01:09

## 2017-09-14 RX ADMIN — ONDANSETRON 8 MG: 8 TABLET, ORALLY DISINTEGRATING ORAL at 11:09

## 2017-09-14 RX ADMIN — DOCUSATE SODIUM 200 MG: 100 CAPSULE, LIQUID FILLED ORAL at 09:09

## 2017-09-14 RX ADMIN — ACETAMINOPHEN 650 MG: 325 TABLET ORAL at 06:09

## 2017-09-14 RX ADMIN — KETOROLAC TROMETHAMINE 30 MG: 30 INJECTION, SOLUTION INTRAMUSCULAR at 10:09

## 2017-09-14 RX ADMIN — SODIUM CHLORIDE: 0.9 INJECTION, SOLUTION INTRAVENOUS at 07:09

## 2017-09-14 RX ADMIN — IBUPROFEN 600 MG: 600 TABLET, FILM COATED ORAL at 09:09

## 2017-09-14 NOTE — PROGRESS NOTES
[facility]  Obstetrics  Postpartum Progress Note    Patient Name: Louisa Morrow  MRN: [id number]  Admission Date: 2017  Hospital Length of Stay: 1 days  Attending Physician: Ismael Edwards MD  Primary Care Provider: Waylon Chery MD    Subjective:     Principal Problem: delivery delivered    Hospital course: 2017 - Admitted for scheduled  delivery at 34w0d. Classical  delivery performed, placenta left in situ. EBL 500cc. Patient taken to IR suite where UAE was performed without complication. Stable post-op.  2017 - Stable POD#1, no acute events.    Interval History:   Patient is doing well this afternoon. Reports some mild nausea today that resolved with phenergan. 1 episode of emesis. Following that she tolerated regular diet. She has been ambulating today, showered, no issues. Reports pain is well controlled with meds. Urinated without difficulty s/p marie removal. Has passed flatus, and has not a BM. Reports 3 small quarter sized spots on her pad today. She denies fever or chills. She is not breastfeeding.    Objective:     Vital Signs (Most Recent):  Temp: 97.6 °F (36.4 °C) (17)  Pulse: 78 (17)  Resp: 16 (17)  BP: 116/60 (17)  SpO2: 98 % (17) Vital Signs (24h Range):  Temp:  [97.1 °F (36.2 °C)-97.9 °F (36.6 °C)] 97.6 °F (36.4 °C)  Pulse:  [78-96] 78  Resp:  [16-18] 16  SpO2:  [97 %-100 %] 98 %  BP: (113-126)/(58-68) 116/60     Weight: 88.4 kg (194 lb 14.2 oz)  Body mass index is 29.64 kg/m².      Intake/Output Summary (Last 24 hours) at 17 1718  Last data filed at 17 1000   Gross per 24 hour   Intake          2085.42 ml   Output             3215 ml   Net         -1129.58 ml       Significant Labs:  Lab Results   Component Value Date    GROUPTRH O POS 2017    HEPBSAG Negative 2012    STREPBCULT Negative 2013       CBC/Anemia Labs: Coags:      Recent Labs  Lab  17  1020 17  1200 17  0532   WBC 16.70* 17.58* 15.77*   HGB 13.8 12.7 12.9   HCT 41.7 38.0 38.6    140* 140*   MCV 94 92 93   RDW 13.6 13.4 13.5      Recent Labs  Lab 17  1200 17  0532   INR 0.9 1.0   APTT 33.8* 33.5*            I have personallly reviewed all pertinent lab results from the last 24 hours.    Physical Exam:   Constitutional: She is oriented to person, place, and time. She appears well-developed and well-nourished.    HENT:   Head: Normocephalic and atraumatic.     Neck: Normal range of motion.    Cardiovascular: Normal rate, regular rhythm and normal heart sounds.     Pulmonary/Chest: Effort normal and breath sounds normal.        Abdominal: Soft. Bowel sounds are normal. She exhibits distension (mild, soft) and abdominal incision (dressing removed, staples in place, c/d/i). There is no tenderness.     Genitourinary:   Genitourinary Comments: No vaginal bleeding noted on pad           Musculoskeletal: Normal range of motion and moves all extremeties. She exhibits no edema.   KELLY/SCDs in place       Neurological: She is alert and oriented to person, place, and time. She has normal reflexes.    Skin: Skin is warm and dry.    Psychiatric: She has a normal mood and affect.       Assessment/Plan:     32 y.o. female  for:    *  delivery delivered    Postpartum care:  - Patient doing well. Continue routine management and advances.  - Remove marie today with passive VT to follow  - Continue IV and PO pain meds. Pain well controlled.  - complains of nausea --> zofran and phenergan prn, keep NPO for now, slowly advance once nausea resolves  - Encourage ambulation.   - Heme: Pre Delivery h/h  --> Immediately Post Delivery h/h  > stable at  this AM  - Coags wnl --> repeat coags this AM wnl  - Contraception - planning for interval hysterectomy  - Lactation - The patient is is NOT breast feeding  - Rh Status - positive          Placenta accreta     - S/p  delivery with placenta left in situ  - plan for interval hysterectomy in approx 4 weeks            Tonie Lawson MD  Obstetrics  [facility]

## 2017-09-14 NOTE — PLAN OF CARE
Copied from baby's NICU chart      SOCIAL WORK DISCHARGE PLANNING ASSESSMENT     Sw completed discharge planning assessment with pt's parents in mother's room 391.  Pt's parents were easily engaged. Education on the role of  was provided. Emotional support provided throughout assessment.        Legal Name: Yosi Delacruz           :  2017         Patient Active Problem List   Diagnosis    Respiratory distress syndrome     Bilateral congenital primary hydronephrosis    Prematurity, 2,000-2,499 grams, 33-34 completed weeks            [facility]           RAQUEL: 10/25/2017     Birth Weight:   2.25 kg (4 lb 15.4 oz)              Birth Length: 45.5 cm              Gestational Age: 34w0d           [place] Assessment    Living status:  Living  Apgars:      1 Minute:   5 Minute:   10 Minute:   15 Minute:   20 Minute:     Skin Color:   1  1          Heart Rate:   2  2          Reflex Irritability:   2  2          Muscle Tone:   2  2          Respiratory Effort:   2  2          Total:   9  9                        Apgars Assigned By:  NICU            Mother: Louisa Morrow,  1985, 33 y/o  Address: [facility] [address] [place] [address] (physical address)  Phone: [phone]  Employer: [employer]                         Job Title: Clerical worker  Education: GED        Father: Sabino Delacruz,  1980, 36 y/o  Address: [facility] [address] [place] [address] (physical address)  Phone: [phone]  Employer: [employer]   Job Title:   Education:  9th grade  Signed Birth Certificate: yes; engaged, cohabitate and been in a relationship for last 2 years.     Support person(s): Bridget Jasmine, jelanim, [phone] and shruti Hassan Uncle, [phone]     Sibling(s): paternal siblings-Wilber, 12 y/o  Maternal siblings-Mj, 9 y/o and Dionicio, 3 y/o     Spiritual Affiliation: None     Commercial Insurance Coverage: No     [employer]  Plan (formerly [place] Medicaid): Primary: Yes Secondary: No   [place] [facility]      Pediatrician: Dr. Dupree in [place]       Nutrition: Formula               Breast Pump:              N/A               WIC:              Mom already certified; will also apply for         Essential Items: (includes car seat, crib/bassinet/pack-n-play, clothing, bottles, diapers, etc.)  Acquired      Transportation: Personal vehicle      Education: Information given on CPR classes and Physician/NNP daily rounds.      Resources Given: [facility], [facility], Medicaid transportation, Immunizations, Glossary of Commonly Used Terms, SSI Benefits, Preparing for Your Baby's Discharge Home, Support Resources for NICU Families, Insurance Coverage of Breast Pumps and Supplies, Breast Pumps through [facility], [facility], [facility], and [facility].        Potential Eligibility for SSI Benefits: No     Potential Discharge Needs:  None      Irais Phelps LCSW     [facility]  [email or web address]     (phone) [phone] or  Unk. [phone]  (fax) [phone]

## 2017-09-14 NOTE — PROGRESS NOTES
[facility]  Obstetrics  Postpartum Progress Note    Patient Name: Louisa Morrow  MRN: [id number]  Admission Date: 2017  Hospital Length of Stay: 1 days  Attending Physician: Ismael Edwards MD  Primary Care Provider: Waylon Chery MD    Subjective:     Principal Problem: delivery delivered    Hospital course: 2017 - Admitted for scheduled  delivery at 34w0d. Classical  delivery performed, placenta left in situ. EBL 500cc. Patient taken to IR suite where UAE was performed without complication. Stable post-op.  2017 - Stable POD#1, no acute events.    Interval History:   Patient is doing well POD#1. She tolerated a regular diet last night without N/V. Reports pain is moderate and controlled with meds. Schofield in place, has not yet ambulated.She has not passed flatus, and has not a BM. Vaginal bleeding is minimal. She denies fever or chills. She is not breastfeeding.    Objective:     Vital Signs (Most Recent):  Temp: 97.1 °F (36.2 °C) (17 042)  Pulse: 87 (17 0425)  Resp: 18 (17 042)  BP: 120/68 (17 0425)  SpO2: 98 % (17 2345) Vital Signs (24h Range):  Temp:  [97.1 °F (36.2 °C)-97.9 °F (36.6 °C)] 97.1 °F (36.2 °C)  Pulse:  [] 87  Resp:  [16-18] 18  SpO2:  [95 %-100 %] 98 %  BP: (105-139)/(51-89) 120/68     Weight: 88.4 kg (194 lb 14.2 oz)  Body mass index is 29.64 kg/m².      Intake/Output Summary (Last 24 hours) at 17 0614  Last data filed at 17 0400   Gross per 24 hour   Intake          3485.42 ml   Output             3115 ml   Net           370.42 ml       Significant Labs:  Lab Results   Component Value Date    GROUPTRH O POS 2017    HEPBSAG Negative 2012    STREPBCULT Negative 2013       CBC/Anemia Labs: Coags:      Recent Labs  Lab 17  1020 17  1200   WBC 16.70* 17.58*   HGB 13.8 12.7   HCT 41.7 38.0    140*   MCV 94 92   RDW 13.6 13.4      Recent Labs  Lab  17  1200   INR 0.9   APTT 33.8*            I have personallly reviewed all pertinent lab results from the last 24 hours.    Physical Exam:   Constitutional: She is oriented to person, place, and time. She appears well-developed and well-nourished.    HENT:   Head: Normocephalic and atraumatic.     Neck: Normal range of motion.    Cardiovascular: Normal rate, regular rhythm and normal heart sounds.     Pulmonary/Chest: Effort normal and breath sounds normal.        Abdominal: Soft. Bowel sounds are normal. She exhibits abdominal incision (dressing in place, small amount of drainage on bandage, marked). She exhibits no distension. There is no tenderness.   Fundus firm, below umbilicus     Genitourinary:   Genitourinary Comments: No vaginal bleeding noted on pad           Musculoskeletal: Normal range of motion and moves all extremeties. She exhibits no edema.   KELLY/SCDs in place       Neurological: She is alert and oriented to person, place, and time.    Skin: Skin is warm and dry.    Psychiatric: She has a normal mood and affect.       Assessment/Plan:     32 y.o. female  for:    *  delivery delivered    Postpartum care:  - Patient doing well. Continue routine management and advances.  - Remove marie today with passive VT to follow  - Continue IV and PO pain meds. Pain well controlled.  - complains of nausea --> zofran and phenergan prn, keep NPO for now, slowly advance once nausea resolves  - Encourage ambulation.   - Heme: Pre Delivery h/h  --> Immediately Post Delivery h/h   - Coags wnl --> repeat coags this AM pending  - Contraception - planning for interval hysterectomy  - Lactation - The patient is is NOT breast feeding  - Rh Status - positive          Placenta accreta    - S/p  delivery with placenta left in situ  - plan for interval hysterectomy in approx 4 weeks            Disposition: As patient meets milestones, will plan to discharge once meeting post-op  milestones.    Tonie Lawson MD  Obstetrics  [facility]

## 2017-09-14 NOTE — ASSESSMENT & PLAN NOTE
Postpartum care:  - Patient doing well. Continue routine management and advances.  - Remove marie today with passive VT to follow  - Continue IV and PO pain meds. Pain well controlled.  - complains of nausea --> zofran and phenergan prn, keep NPO for now, slowly advance once nausea resolves  - Encourage ambulation.   - Heme: Pre Delivery h/h 13/41 --> Immediately Post Delivery h/h 12/38  - Coags wnl --> repeat coags this AM pending  - Contraception - planning for interval hysterectomy  - Lactation - The patient is is NOT breast feeding  - Rh Status - positive

## 2017-09-14 NOTE — SUBJECTIVE & OBJECTIVE
Hospital course: 2017 - Admitted for scheduled  delivery at 34w0d. Classical  delivery performed, placenta left in situ. EBL 500cc. Patient taken to IR suite where UAE was performed without complication. Stable post-op.  2017 - Stable POD#1, no acute events.    Interval History:   Patient is doing well this afternoon. Reports some mild nausea today that resolved with phenergan. 1 episode of emesis. Following that she tolerated regular diet. She has been ambulating today, showered, no issues. Reports pain is well controlled with meds. Urinated without difficulty s/p marie removal. Has passed flatus, and has not a BM. Reports 3 small quarter sized spots on her pad today. She denies fever or chills. She is not breastfeeding.    Objective:     Vital Signs (Most Recent):  Temp: 97.6 °F (36.4 °C) (17 164)  Pulse: 78 (17)  Resp: 16 (17)  BP: 116/60 (17)  SpO2: 98 % (17) Vital Signs (24h Range):  Temp:  [97.1 °F (36.2 °C)-97.9 °F (36.6 °C)] 97.6 °F (36.4 °C)  Pulse:  [78-96] 78  Resp:  [16-18] 16  SpO2:  [97 %-100 %] 98 %  BP: (113-126)/(58-68) 116/60     Weight: 88.4 kg (194 lb 14.2 oz)  Body mass index is 29.64 kg/m².      Intake/Output Summary (Last 24 hours) at 17 1718  Last data filed at 17 1000   Gross per 24 hour   Intake          2085.42 ml   Output             3215 ml   Net         -1129.58 ml       Significant Labs:  Lab Results   Component Value Date    GROUPTRH O POS 2017    HEPBSAG Negative 2012    STREPBCULT Negative 2013       CBC/Anemia Labs: Coags:      Recent Labs  Lab 17  1020 17  1200 17  0532   WBC 16.70* 17.58* 15.77*   HGB 13.8 12.7 12.9   HCT 41.7 38.0 38.6    140* 140*   MCV 94 92 93   RDW 13.6 13.4 13.5      Recent Labs  Lab 17  1200 17  0532   INR 0.9 1.0   APTT 33.8* 33.5*            I have personallly reviewed all pertinent lab results from the last 24  hours.    Physical Exam:   Constitutional: She is oriented to person, place, and time. She appears well-developed and well-nourished.    HENT:   Head: Normocephalic and atraumatic.     Neck: Normal range of motion.    Cardiovascular: Normal rate, regular rhythm and normal heart sounds.     Pulmonary/Chest: Effort normal and breath sounds normal.        Abdominal: Soft. Bowel sounds are normal. She exhibits distension (mild, soft) and abdominal incision (dressing removed, staples in place, c/d/i). There is no tenderness.     Genitourinary:   Genitourinary Comments: No vaginal bleeding noted on pad           Musculoskeletal: Normal range of motion and moves all extremeties. She exhibits no edema.   KELLY/SCDs in place       Neurological: She is alert and oriented to person, place, and time. She has normal reflexes.    Skin: Skin is warm and dry.    Psychiatric: She has a normal mood and affect.

## 2017-09-14 NOTE — ASSESSMENT & PLAN NOTE
Postpartum care:  - Patient doing well. Continue routine management and advances.  - Remove marie today with passive VT to follow  - Continue IV and PO pain meds. Pain well controlled.  - complains of nausea --> zofran and phenergan prn, keep NPO for now, slowly advance once nausea resolves  - Encourage ambulation.   - Heme: Pre Delivery h/h 13/41 --> Immediately Post Delivery h/h 12/38 > stable at 12/38 this AM  - Coags wnl --> repeat coags this AM wnl  - Contraception - planning for interval hysterectomy  - Lactation - The patient is is NOT breast feeding  - Rh Status - positive

## 2017-09-14 NOTE — PLAN OF CARE
Problem: Patient Care Overview  Goal: Plan of Care Review  Outcome: Ongoing (interventions implemented as appropriate)  VSS. Incision c,d,i. Light lochia present.  No fundal checks, breast pumping/breast feeding per MD order. Pain controlled with PO pain medication. Catheter in place, UO WNL. Plan of care reviewed with patient. Pt verbalized understanding. All questions answered.

## 2017-09-14 NOTE — SUBJECTIVE & OBJECTIVE
Hospital course: 2017 - Admitted for scheduled  delivery at 34w0d. Classical  delivery performed, placenta left in situ. EBL 500cc. Patient taken to IR suite where UAE was performed without complication. Stable post-op.  2017 - Stable POD#1, no acute events.    Interval History:   Patient is doing well POD#1. She tolerated a regular diet last night without N/V. Reports pain is moderate and controlled with meds. Schofield in place, has not yet ambulated.She has not passed flatus, and has not a BM. Vaginal bleeding is minimal. She denies fever or chills. She is not breastfeeding.    Objective:     Vital Signs (Most Recent):  Temp: 97.1 °F (36.2 °C) (17)  Pulse: 87 (17)  Resp: 18 (17)  BP: 120/68 (17 0425)  SpO2: 98 % (17 2345) Vital Signs (24h Range):  Temp:  [97.1 °F (36.2 °C)-97.9 °F (36.6 °C)] 97.1 °F (36.2 °C)  Pulse:  [] 87  Resp:  [16-18] 18  SpO2:  [95 %-100 %] 98 %  BP: (105-139)/(51-89) 120/68     Weight: 88.4 kg (194 lb 14.2 oz)  Body mass index is 29.64 kg/m².      Intake/Output Summary (Last 24 hours) at 17 0614  Last data filed at 17 0400   Gross per 24 hour   Intake          3485.42 ml   Output             3115 ml   Net           370.42 ml       Significant Labs:  Lab Results   Component Value Date    GROUPTRH O POS 2017    HEPBSAG Negative 2012    STREPBCULT Negative 2013       CBC/Anemia Labs: Coags:      Recent Labs  Lab 17  1020 17  1200   WBC 16.70* 17.58*   HGB 13.8 12.7   HCT 41.7 38.0    140*   MCV 94 92   RDW 13.6 13.4      Recent Labs  Lab 17  1200   INR 0.9   APTT 33.8*            I have personallly reviewed all pertinent lab results from the last 24 hours.    Physical Exam:   Constitutional: She is oriented to person, place, and time. She appears well-developed and well-nourished.    HENT:   Head: Normocephalic and atraumatic.     Neck: Normal range of motion.     Cardiovascular: Normal rate, regular rhythm and normal heart sounds.     Pulmonary/Chest: Effort normal and breath sounds normal.        Abdominal: Soft. Bowel sounds are normal. She exhibits abdominal incision (dressing in place, small amount of drainage on bandage, marked). She exhibits no distension. There is no tenderness.   Fundus firm, below umbilicus     Genitourinary:   Genitourinary Comments: No vaginal bleeding noted on pad           Musculoskeletal: Normal range of motion and moves all extremeties. She exhibits no edema.   KELLY/SCDs in place       Neurological: She is alert and oriented to person, place, and time.    Skin: Skin is warm and dry.    Psychiatric: She has a normal mood and affect.

## 2017-09-15 PROCEDURE — 86592 SYPHILIS TEST NON-TREP QUAL: CPT

## 2017-09-15 PROCEDURE — 25000003 PHARM REV CODE 250: Performed by: STUDENT IN AN ORGANIZED HEALTH CARE EDUCATION/TRAINING PROGRAM

## 2017-09-15 PROCEDURE — 86762 RUBELLA ANTIBODY: CPT

## 2017-09-15 PROCEDURE — 25000003 PHARM REV CODE 250: Performed by: OBSTETRICS & GYNECOLOGY

## 2017-09-15 PROCEDURE — 87340 HEPATITIS B SURFACE AG IA: CPT

## 2017-09-15 PROCEDURE — 99231 SBSQ HOSP IP/OBS SF/LOW 25: CPT | Mod: ,,, | Performed by: OBSTETRICS & GYNECOLOGY

## 2017-09-15 PROCEDURE — 63600175 PHARM REV CODE 636 W HCPCS: Performed by: OBSTETRICS & GYNECOLOGY

## 2017-09-15 PROCEDURE — 36415 COLL VENOUS BLD VENIPUNCTURE: CPT

## 2017-09-15 PROCEDURE — 11000001 HC ACUTE MED/SURG PRIVATE ROOM

## 2017-09-15 PROCEDURE — 86703 HIV-1/HIV-2 1 RESULT ANTBDY: CPT

## 2017-09-15 RX ORDER — BUTALBITAL, ACETAMINOPHEN AND CAFFEINE 50; 325; 40 MG/1; MG/1; MG/1
1 TABLET ORAL EVERY 6 HOURS PRN
Status: DISCONTINUED | OUTPATIENT
Start: 2017-09-15 | End: 2017-09-19 | Stop reason: HOSPADM

## 2017-09-15 RX ORDER — HYDROMORPHONE HYDROCHLORIDE 1 MG/ML
1 INJECTION, SOLUTION INTRAMUSCULAR; INTRAVENOUS; SUBCUTANEOUS
Status: DISCONTINUED | OUTPATIENT
Start: 2017-09-15 | End: 2017-09-18

## 2017-09-15 RX ADMIN — HYDROCODONE BITARTRATE AND ACETAMINOPHEN 1 TABLET: 10; 325 TABLET ORAL at 04:09

## 2017-09-15 RX ADMIN — HYDROCODONE BITARTRATE AND ACETAMINOPHEN 1 TABLET: 10; 325 TABLET ORAL at 11:09

## 2017-09-15 RX ADMIN — IBUPROFEN 600 MG: 600 TABLET, FILM COATED ORAL at 04:09

## 2017-09-15 RX ADMIN — BUTALBITAL, ACETAMINOPHEN AND CAFFEINE 1 TABLET: 50; 325; 40 TABLET ORAL at 03:09

## 2017-09-15 RX ADMIN — IBUPROFEN 600 MG: 600 TABLET, FILM COATED ORAL at 09:09

## 2017-09-15 RX ADMIN — SIMETHICONE CHEW TAB 80 MG 80 MG: 80 TABLET ORAL at 06:09

## 2017-09-15 RX ADMIN — HYDROCODONE BITARTRATE AND ACETAMINOPHEN 1 TABLET: 10; 325 TABLET ORAL at 12:09

## 2017-09-15 RX ADMIN — ONDANSETRON 8 MG: 8 TABLET, ORALLY DISINTEGRATING ORAL at 06:09

## 2017-09-15 RX ADMIN — HYDROMORPHONE HYDROCHLORIDE 1 MG: 1 INJECTION, SOLUTION INTRAMUSCULAR; INTRAVENOUS; SUBCUTANEOUS at 08:09

## 2017-09-15 RX ADMIN — DOCUSATE SODIUM 200 MG: 100 CAPSULE, LIQUID FILLED ORAL at 08:09

## 2017-09-15 RX ADMIN — HYDROMORPHONE HYDROCHLORIDE 1 MG: 1 INJECTION, SOLUTION INTRAMUSCULAR; INTRAVENOUS; SUBCUTANEOUS at 01:09

## 2017-09-15 RX ADMIN — ONDANSETRON 8 MG: 8 TABLET, ORALLY DISINTEGRATING ORAL at 09:09

## 2017-09-15 RX ADMIN — HYDROCODONE BITARTRATE AND ACETAMINOPHEN 1 TABLET: 10; 325 TABLET ORAL at 08:09

## 2017-09-15 RX ADMIN — PROMETHAZINE HYDROCHLORIDE 12.5 MG: 25 INJECTION INTRAMUSCULAR; INTRAVENOUS at 07:09

## 2017-09-15 RX ADMIN — IBUPROFEN 600 MG: 600 TABLET, FILM COATED ORAL at 10:09

## 2017-09-15 RX ADMIN — HYDROMORPHONE HYDROCHLORIDE 1 MG: 1 INJECTION, SOLUTION INTRAMUSCULAR; INTRAVENOUS; SUBCUTANEOUS at 09:09

## 2017-09-15 RX ADMIN — HYDROMORPHONE HYDROCHLORIDE 1 MG: 1 INJECTION, SOLUTION INTRAMUSCULAR; INTRAVENOUS; SUBCUTANEOUS at 06:09

## 2017-09-15 RX ADMIN — SIMETHICONE CHEW TAB 80 MG 80 MG: 80 TABLET ORAL at 11:09

## 2017-09-15 NOTE — PLAN OF CARE
Problem: Patient Care Overview  Goal: Plan of Care Review  Outcome: Ongoing (interventions implemented as appropriate)  Pt continues to c/o of incisional pain temporarily relieved by pain medications, pt reported h/a was relieved by fioricet, denies n/v, denies bright red blood, reported old brown blood, MD aware, VSS, bed at lowest position, call light within reach, side rails up x2, family to bedside, pt has no further questions, comments, or complaints at this time, will continue to monitor.

## 2017-09-15 NOTE — ASSESSMENT & PLAN NOTE
Postpartum care:  - Patient doing well. Continue routine management and advances.  - Remove marie today with passive VT to follow  - Continue PO pain meds --> scheduled ibuprofen, Norco prn, add dilaudid for BTP; simethicone TID and colace BID  - Encourage ambulation.   - Heme: Pre Delivery h/h 13/41 --> Immediately Post Delivery h/h 12/38 > stable at 12/38 this POD#1  - Coags wnl --> repeat coags POD#1 wnl  - Contraception - planning for interval hysterectomy  - Lactation - The patient is is NOT breast feeding  - Rh Status - positive

## 2017-09-15 NOTE — ANESTHESIA POSTPROCEDURE EVALUATION
"Anesthesia Post Evaluation    Patient: Louisa Morrow    Procedure(s) Performed: Procedure(s) (LRB):  DELIVERY- SECTION (N/A)    Final Anesthesia Type: CSE  Patient location during evaluation: labor & delivery  Patient participation: Yes- Able to Participate  Level of consciousness: awake and alert  Post-procedure vital signs: reviewed and stable  Pain management: adequate  Airway patency: patent  PONV status at discharge: No PONV  Anesthetic complications: no      Cardiovascular status: stable  Respiratory status: unassisted and spontaneous ventilation  Hydration status: euvolemic  Follow-up not needed.        Visit Vitals  /74   Pulse 74   Temp 35.8 °C (96.4 °F) (Temporal)   Resp 18   Ht 5' 7.99" (1.727 m)   Wt 88.4 kg (194 lb 14.2 oz)   SpO2 100%   Breastfeeding? Unknown   BMI 29.64 kg/m²       Pain/Jah Score: Pain Rating Prior to Med Admin: 6 (9/15/2017  9:52 AM)  Pain Rating Post Med Admin: 6 (9/15/2017 10:10 AM)      "

## 2017-09-15 NOTE — NURSING
Pt reported passing a clot while voiding, the clot was described as the diameter of a golf ball, as well as old brown blood upon wiping, Dr. Lawson notified, no new orders, will continue to monitor.

## 2017-09-15 NOTE — PLAN OF CARE
Problem: Patient Care Overview  Goal: Plan of Care Review  Outcome: Ongoing (interventions implemented as appropriate)  Patient in no distress on RA  . Sats  99 %. Will continue to monitor.

## 2017-09-15 NOTE — SUBJECTIVE & OBJECTIVE
Hospital course: 2017 - Admitted for scheduled  delivery at 34w0d. Classical  delivery performed, placenta left in situ. EBL 500cc. Patient taken to IR suite where UAE was performed without complication. Stable post-op.  2017 - Stable POD#1, no acute events.  09/15/2017 - POD#2, meeting milestones, working on pain control    Interval History:   Patient complaining of abdominal pain this morning that is only somewhat relieved with pain meds. Thinks she may have moved too much yesterday. Tolerating regular diet without N/V. . Urinating without difficulty, no blood in urine. Has passed only a small amount of flatus, and has not a BM. Reports only a few spots of blood on her pad. Denies fever, chills, CP, SOB, palpitations.    Objective:     Vital Signs (Most Recent):  Temp: 97.3 °F (36.3 °C) (09/15/17 0409)  Pulse: 76 (09/15/17 0409)  Resp: 18 (09/15/17 040)  BP: 113/67 (09/15/17 0406)  SpO2: 100 % (09/15/17 040) Vital Signs (24h Range):  Temp:  [97.3 °F (36.3 °C)-97.7 °F (36.5 °C)] 97.3 °F (36.3 °C)  Pulse:  [67-96] 76  Resp:  [16-18] 18  SpO2:  [98 %-100 %] 100 %  BP: (111-126)/(57-70) 113/67     Weight: 88.4 kg (194 lb 14.2 oz)  Body mass index is 29.64 kg/m².      Intake/Output Summary (Last 24 hours) at 09/15/17 0615  Last data filed at 17 1855   Gross per 24 hour   Intake             1320 ml   Output             2000 ml   Net             -680 ml       Significant Labs:  Lab Results   Component Value Date    GROUPTRH O POS 2017    HEPBSAG Negative 2012    STREPBCULT Negative 2013       CBC/Anemia Labs: Coags:      Recent Labs  Lab 17  1020 17  1200 17  0532   WBC 16.70* 17.58* 15.77*   HGB 13.8 12.7 12.9   HCT 41.7 38.0 38.6    140* 140*   MCV 94 92 93   RDW 13.6 13.4 13.5      Recent Labs  Lab 17  1200 17  0532   INR 0.9 1.0   APTT 33.8* 33.5*            I have personallly reviewed all pertinent lab results from the last 24  hours.    Physical Exam:   Constitutional: She is oriented to person, place, and time. She appears well-developed and well-nourished.    HENT:   Head: Normocephalic and atraumatic.     Neck: Normal range of motion.    Cardiovascular: Normal rate, regular rhythm and normal heart sounds.     Pulmonary/Chest: Effort normal and breath sounds normal.        Abdominal: Soft. Bowel sounds are normal. She exhibits distension (mild, soft) and abdominal incision (dressing removed, staples in place, c/d/i). There is no tenderness.     Genitourinary:   Genitourinary Comments: No vaginal bleeding noted on pad           Musculoskeletal: Normal range of motion and moves all extremeties. She exhibits no edema.   KELLY/SCDs in place       Neurological: She is alert and oriented to person, place, and time. She has normal reflexes.    Skin: Skin is warm and dry.    Psychiatric: She has a normal mood and affect.

## 2017-09-15 NOTE — PROGRESS NOTES
Ms. Morrow doing well post C section and placental embolization.  Still with some abdominal cramping/pain.  Radial puncture site CDI.    Continue pain control and management per OB.  Will plan for second embolization prior to hysterectomy in approximately 4 weeks.

## 2017-09-15 NOTE — PROGRESS NOTES
[facility]  Obstetrics  Postpartum Progress Note    Patient Name: Louisa Morrow  MRN: [id number]  Admission Date: 2017  Hospital Length of Stay: 2 days  Attending Physician: Ismael Edwards MD  Primary Care Provider: Waylon Chery MD    Subjective:     Principal Problem: delivery delivered    Hospital course: 2017 - Admitted for scheduled  delivery at 34w0d. Classical  delivery performed, placenta left in situ. EBL 500cc. Patient taken to IR suite where UAE was performed without complication. Stable post-op.  2017 - Stable POD#1, no acute events.  09/15/2017 - POD#2, meeting milestones, working on pain control    Interval History:   Patient complaining of abdominal pain this morning that is only somewhat relieved with pain meds. Thinks she may have moved too much yesterday. Tolerating regular diet without N/V. . Urinating without difficulty, no blood in urine. Has passed only a small amount of flatus, and has not a BM. Reports only a few spots of blood on her pad. Denies fever, chills, CP, SOB, palpitations.    Objective:     Vital Signs (Most Recent):  Temp: 97.3 °F (36.3 °C) (09/15/17 0409)  Pulse: 76 (09/15/17 0409)  Resp: 18 (09/15/17 0409)  BP: 113/67 (09/15/17 0406)  SpO2: 100 % (09/15/17 0409) Vital Signs (24h Range):  Temp:  [97.3 °F (36.3 °C)-97.7 °F (36.5 °C)] 97.3 °F (36.3 °C)  Pulse:  [67-96] 76  Resp:  [16-18] 18  SpO2:  [98 %-100 %] 100 %  BP: (111-126)/(57-70) 113/67     Weight: 88.4 kg (194 lb 14.2 oz)  Body mass index is 29.64 kg/m².      Intake/Output Summary (Last 24 hours) at 09/15/17 0615  Last data filed at 17 1855   Gross per 24 hour   Intake             1320 ml   Output             2000 ml   Net             -680 ml       Significant Labs:  Lab Results   Component Value Date    GROUPTRH O POS 2017    HEPBSAG Negative 2012    STREPBCULT Negative 2013       CBC/Anemia Labs: Coags:      Recent Labs  Lab  17  1020 17  1200 17  0532   WBC 16.70* 17.58* 15.77*   HGB 13.8 12.7 12.9   HCT 41.7 38.0 38.6    140* 140*   MCV 94 92 93   RDW 13.6 13.4 13.5      Recent Labs  Lab 17  1200 17  0532   INR 0.9 1.0   APTT 33.8* 33.5*            I have personallly reviewed all pertinent lab results from the last 24 hours.    Physical Exam:   Constitutional: She is oriented to person, place, and time. She appears well-developed and well-nourished.    HENT:   Head: Normocephalic and atraumatic.     Neck: Normal range of motion.    Cardiovascular: Normal rate, regular rhythm and normal heart sounds.     Pulmonary/Chest: Effort normal and breath sounds normal.        Abdominal: Soft. Bowel sounds are normal. She exhibits distension (mild, soft) and abdominal incision (dressing removed, staples in place, c/d/i). There is no tenderness.     Genitourinary:   Genitourinary Comments: No vaginal bleeding noted on pad           Musculoskeletal: Normal range of motion and moves all extremeties. She exhibits no edema.   KELLY/SCDs in place       Neurological: She is alert and oriented to person, place, and time. She has normal reflexes.    Skin: Skin is warm and dry.    Psychiatric: She has a normal mood and affect.       Assessment/Plan:     32 y.o. female  for:    *  delivery delivered    Postpartum care:  - Patient doing well. Continue routine management and advances.  - Remove marie today with passive VT to follow  - Continue PO pain meds --> scheduled ibuprofen, Norco prn, add dilaudid for BTP; simethicone TID and colace BID  - Encourage ambulation.   - Heme: Pre Delivery h/h  --> Immediately Post Delivery h/h  > stable at 12/38 this POD#1  - Coags wnl --> repeat coags POD#1 wnl  - Contraception - planning for interval hysterectomy  - Lactation - The patient is is NOT breast feeding  - Rh Status - positive            Placenta accreta    - S/p  delivery with placenta left in  situ  - plan for interval hysterectomy in approx 4 weeks          Plan to DC home Monday if patient remains stable    Tonie Lawson MD  Obstetrics  [facility]

## 2017-09-16 LAB
ABO + RH BLD: NORMAL
BASOPHILS # BLD AUTO: 0.01 K/UL
BASOPHILS NFR BLD: 0.1 %
BLD GP AB SCN CELLS X3 SERPL QL: NORMAL
CRP SERPL-MCNC: 132.3 MG/L
DIFFERENTIAL METHOD: ABNORMAL
EOSINOPHIL # BLD AUTO: 0.4 K/UL
EOSINOPHIL NFR BLD: 2.6 %
ERYTHROCYTE [DISTWIDTH] IN BLOOD BY AUTOMATED COUNT: 13.2 %
HCT VFR BLD AUTO: 37.4 %
HGB BLD-MCNC: 12.6 G/DL
LYMPHOCYTES # BLD AUTO: 1.9 K/UL
LYMPHOCYTES NFR BLD: 14.4 %
MCH RBC QN AUTO: 31 PG
MCHC RBC AUTO-ENTMCNC: 33.7 G/DL
MCV RBC AUTO: 92 FL
MONOCYTES # BLD AUTO: 1 K/UL
MONOCYTES NFR BLD: 7.6 %
NEUTROPHILS # BLD AUTO: 10.1 K/UL
NEUTROPHILS NFR BLD: 74.9 %
PLATELET # BLD AUTO: 171 K/UL
PMV BLD AUTO: 10.7 FL
RBC # BLD AUTO: 4.06 M/UL
WBC # BLD AUTO: 13.5 K/UL

## 2017-09-16 PROCEDURE — 11000001 HC ACUTE MED/SURG PRIVATE ROOM

## 2017-09-16 PROCEDURE — 86920 COMPATIBILITY TEST SPIN: CPT

## 2017-09-16 PROCEDURE — 36415 COLL VENOUS BLD VENIPUNCTURE: CPT

## 2017-09-16 PROCEDURE — 86901 BLOOD TYPING SEROLOGIC RH(D): CPT

## 2017-09-16 PROCEDURE — 25000003 PHARM REV CODE 250: Performed by: STUDENT IN AN ORGANIZED HEALTH CARE EDUCATION/TRAINING PROGRAM

## 2017-09-16 PROCEDURE — 86900 BLOOD TYPING SEROLOGIC ABO: CPT

## 2017-09-16 PROCEDURE — 25000003 PHARM REV CODE 250: Performed by: OBSTETRICS & GYNECOLOGY

## 2017-09-16 PROCEDURE — 99233 SBSQ HOSP IP/OBS HIGH 50: CPT | Mod: ,,, | Performed by: OBSTETRICS & GYNECOLOGY

## 2017-09-16 PROCEDURE — 63600175 PHARM REV CODE 636 W HCPCS: Performed by: OBSTETRICS & GYNECOLOGY

## 2017-09-16 PROCEDURE — 86140 C-REACTIVE PROTEIN: CPT

## 2017-09-16 PROCEDURE — 85025 COMPLETE CBC W/AUTO DIFF WBC: CPT

## 2017-09-16 RX ORDER — HYDROCODONE BITARTRATE AND ACETAMINOPHEN 500; 5 MG/1; MG/1
TABLET ORAL
Status: DISCONTINUED | OUTPATIENT
Start: 2017-09-16 | End: 2017-09-19 | Stop reason: HOSPADM

## 2017-09-16 RX ADMIN — HYDROCODONE BITARTRATE AND ACETAMINOPHEN 1 TABLET: 10; 325 TABLET ORAL at 04:09

## 2017-09-16 RX ADMIN — IBUPROFEN 600 MG: 600 TABLET, FILM COATED ORAL at 04:09

## 2017-09-16 RX ADMIN — HYDROCODONE BITARTRATE AND ACETAMINOPHEN 1 TABLET: 10; 325 TABLET ORAL at 11:09

## 2017-09-16 RX ADMIN — BUTALBITAL, ACETAMINOPHEN AND CAFFEINE 1 TABLET: 50; 325; 40 TABLET ORAL at 02:09

## 2017-09-16 RX ADMIN — HYDROCODONE BITARTRATE AND ACETAMINOPHEN 1 TABLET: 10; 325 TABLET ORAL at 09:09

## 2017-09-16 RX ADMIN — PROMETHAZINE HYDROCHLORIDE 12.5 MG: 25 INJECTION INTRAMUSCULAR; INTRAVENOUS at 09:09

## 2017-09-16 RX ADMIN — HYDROMORPHONE HYDROCHLORIDE 1 MG: 1 INJECTION, SOLUTION INTRAMUSCULAR; INTRAVENOUS; SUBCUTANEOUS at 02:09

## 2017-09-16 RX ADMIN — DOCUSATE SODIUM 200 MG: 100 CAPSULE, LIQUID FILLED ORAL at 09:09

## 2017-09-16 RX ADMIN — HYDROCODONE BITARTRATE AND ACETAMINOPHEN 1 TABLET: 10; 325 TABLET ORAL at 07:09

## 2017-09-16 RX ADMIN — STANDARDIZED SENNA CONCENTRATE AND DOCUSATE SODIUM 1 TABLET: 8.6; 5 TABLET, FILM COATED ORAL at 09:09

## 2017-09-16 RX ADMIN — IBUPROFEN 600 MG: 600 TABLET, FILM COATED ORAL at 10:09

## 2017-09-16 RX ADMIN — IBUPROFEN 600 MG: 600 TABLET, FILM COATED ORAL at 03:09

## 2017-09-16 RX ADMIN — HYDROMORPHONE HYDROCHLORIDE 1 MG: 1 INJECTION, SOLUTION INTRAMUSCULAR; INTRAVENOUS; SUBCUTANEOUS at 06:09

## 2017-09-16 RX ADMIN — HYDROMORPHONE HYDROCHLORIDE 1 MG: 1 INJECTION, SOLUTION INTRAMUSCULAR; INTRAVENOUS; SUBCUTANEOUS at 09:09

## 2017-09-16 NOTE — PLAN OF CARE
Problem: Patient Care Overview  Goal: Plan of Care Review  Patient reports pain minimum rating of 6 after scheduled medications and PRN breakthrough medications described as cramping.  Encouraged to increase ambulation and fluid intake.  One episode of nausea noted this evening which was resolved with phenergan.  Incision with staples intact.  No redness or swelling noted.  VSS.  Will monitor.

## 2017-09-16 NOTE — ASSESSMENT & PLAN NOTE
Postpartum care:  - Patient doing well. Continue routine management and advances.  - Continue PO pain meds --> scheduled ibuprofen, Norco prn, dilaudid for BTP; simethicone TID and colace BID  - Encourage ambulation.   - Heme: Pre Delivery h/h 13/41 --> Immediately Post Delivery h/h 12/38 > stable at 12/38 this POD#1  - Coags wnl --> repeat coags POD#1 wnl.  Will repeat on Monday  - Contraception - planning for interval hysterectomy  - Lactation - The patient is is NOT breast feeding  - Rh Status - positive

## 2017-09-16 NOTE — ASSESSMENT & PLAN NOTE
- S/p  delivery with placenta left in situ  - plan for interval hysterectomy in approx 4 weeks  - No vaginal bleeding overnight

## 2017-09-16 NOTE — SUBJECTIVE & OBJECTIVE
Hospital course: 2017 - Admitted for scheduled  delivery at 34w0d. Classical  delivery performed, placenta left in situ. EBL 500cc. Patient taken to IR suite where UAE was performed without complication. Stable post-op.  2017 - Stable POD#1, no acute events.  09/15/2017 - POD#2, meeting milestones, working on pain control  2017 - POD#3,  Meeting milestones.  No acute issues      Interval History:   Pt with pain controlled today. Tolerating regular diet without N/V. . Urinating without difficulty, no blood in urine. Has passed only a small amount of flatus, and has not a BM. Reports only a few spots of blood on her pad. Denies fever, chills, CP, SOB, palpitations.    Objective:     Vital Signs (Most Recent):  Temp: 97.8 °F (36.6 °C) (17 0352)  Pulse: 76 (17 0352)  Resp: 16 (17 0352)  BP: 120/74 (17 0352)  SpO2: 100 % (09/15/17 1632) Vital Signs (24h Range):  Temp:  [96.4 °F (35.8 °C)-98.6 °F (37 °C)] 97.8 °F (36.6 °C)  Pulse:  [73-85] 76  Resp:  [16-18] 16  SpO2:  [97 %-100 %] 100 %  BP: (115-125)/(61-77) 120/74     Weight: 88.4 kg (194 lb 14.2 oz)  Body mass index is 29.64 kg/m².      Intake/Output Summary (Last 24 hours) at 17 0809  Last data filed at 17 0600   Gross per 24 hour   Intake             2900 ml   Output             2600 ml   Net              300 ml       Significant Labs:  Lab Results   Component Value Date    GROUPTRH O POS 2017    HEPBSAG Negative 2012    STREPBCULT Negative 2013       CBC/Anemia Labs: Coags:      Recent Labs  Lab 17  1020 17  1200 17  0532   WBC 16.70* 17.58* 15.77*   HGB 13.8 12.7 12.9   HCT 41.7 38.0 38.6    140* 140*   MCV 94 92 93   RDW 13.6 13.4 13.5      Recent Labs  Lab 17  1200 17  0532   INR 0.9 1.0   APTT 33.8* 33.5*            I have personallly reviewed all pertinent lab results from the last 24 hours.    Physical Exam:   Constitutional: She is  oriented to person, place, and time. She appears well-developed and well-nourished.    HENT:   Head: Normocephalic and atraumatic.     Neck: Normal range of motion.    Cardiovascular: Normal rate, regular rhythm and normal heart sounds.     Pulmonary/Chest: Effort normal and breath sounds normal.        Abdominal: Soft. Bowel sounds are normal. She exhibits distension (mild, soft) and abdominal incision (dressing removed, staples in place, c/d/i). There is no tenderness.     Genitourinary:   Genitourinary Comments: No vaginal bleeding noted on pad           Musculoskeletal: Normal range of motion and moves all extremeties. She exhibits no edema.   KELLY/SCDs in place       Neurological: She is alert and oriented to person, place, and time. She has normal reflexes.    Skin: Skin is warm and dry.    Psychiatric: She has a normal mood and affect.

## 2017-09-16 NOTE — PROGRESS NOTES
[facility]  Obstetrics  Postpartum Progress Note    Patient Name: Louisa Morrow  MRN: [id number]  Admission Date: 2017  Hospital Length of Stay: 3 days  Attending Physician: Ismael Edwards MD  Primary Care Provider: Waylon Chery MD    Subjective:     Principal Problem: delivery delivered    Hospital course: 2017 - Admitted for scheduled  delivery at 34w0d. Classical  delivery performed, placenta left in situ. EBL 500cc. Patient taken to IR suite where UAE was performed without complication. Stable post-op.  2017 - Stable POD#1, no acute events.  09/15/2017 - POD#2, meeting milestones, working on pain control  2017 - POD#3,  Meeting milestones.  No acute issues      Interval History:   Pt with pain controlled today.  Denies any vaginal bleeding overnight. Tolerating regular diet without N/V. . Urinating without difficulty, no blood in urine. Has passed only a small amount of flatus, and has not a BM. Reports only a few spots of blood on her pad. Denies fever, chills, CP, SOB, palpitations.    Objective:     Vital Signs (Most Recent):  Temp: 97.8 °F (36.6 °C) (17 0352)  Pulse: 76 (17 0352)  Resp: 16 (17 0352)  BP: 120/74 (17 0352)  SpO2: 100 % (09/15/17 1632) Vital Signs (24h Range):  Temp:  [96.4 °F (35.8 °C)-98.6 °F (37 °C)] 97.8 °F (36.6 °C)  Pulse:  [73-85] 76  Resp:  [16-18] 16  SpO2:  [97 %-100 %] 100 %  BP: (115-125)/(61-77) 120/74     Weight: 88.4 kg (194 lb 14.2 oz)  Body mass index is 29.64 kg/m².      Intake/Output Summary (Last 24 hours) at 17 0809  Last data filed at 17 0600   Gross per 24 hour   Intake             2900 ml   Output             2600 ml   Net              300 ml       Significant Labs:  Lab Results   Component Value Date    GROUPTRH O POS 2017    HEPBSAG Negative 2012    STREPBCULT Negative 2013       CBC/Anemia Labs: Coags:      Recent Labs  Lab 17  1020  17  1200 17  0532   WBC 16.70* 17.58* 15.77*   HGB 13.8 12.7 12.9   HCT 41.7 38.0 38.6    140* 140*   MCV 94 92 93   RDW 13.6 13.4 13.5      Recent Labs  Lab 17  1200 17  0532   INR 0.9 1.0   APTT 33.8* 33.5*            I have personallly reviewed all pertinent lab results from the last 24 hours.    Physical Exam:   Constitutional: She is oriented to person, place, and time. She appears well-developed and well-nourished.    HENT:   Head: Normocephalic and atraumatic.     Neck: Normal range of motion.    Cardiovascular: Normal rate, regular rhythm and normal heart sounds.     Pulmonary/Chest: Effort normal and breath sounds normal.      Abdominal: Soft. Bowel sounds are normal. She exhibits distension (mild, soft) and abdominal incision (staples in place, c/d/i). There is no tenderness.     Genitourinary:   Genitourinary Comments: No vaginal bleeding noted on pad           Musculoskeletal: Normal range of motion and moves all extremeties. She exhibits no edema.   KELLY/SCDs in place       Neurological: She is alert and oriented to person, place, and time. She has normal reflexes.    Skin: Skin is warm and dry.    Psychiatric: She has a normal mood and affect.       Assessment/Plan:     32 y.o. female  for:    *  delivery delivered    Postpartum care:  - Patient doing well. Continue routine management and advances.  - Continue PO pain meds --> scheduled ibuprofen, Norco prn, dilaudid for BTP; simethicone TID and colace BID  - Encourage ambulation.   - Heme: Pre Delivery h/h  --> Immediately Post Delivery h/h  > stable at  this POD#1  - Coags wnl --> repeat coags POD#1 wnl.  Will repeat on Monday  - Contraception - planning for interval hysterectomy  - Lactation - The patient is is NOT breast feeding  - Rh Status - positive          Placenta accreta    - S/p  delivery with placenta left in situ  - plan for interval hysterectomy in approx 4 weeks  - No  vaginal bleeding overnight            Disposition: As patient meets milestones, will plan to discharge Monday.    Julia Boyce MD  Obstetrics  [facility]

## 2017-09-17 LAB
BASOPHILS # BLD AUTO: 0.01 K/UL
BASOPHILS NFR BLD: 0.1 %
CRP SERPL-MCNC: 116.6 MG/L
DIFFERENTIAL METHOD: ABNORMAL
EOSINOPHIL # BLD AUTO: 0.4 K/UL
EOSINOPHIL NFR BLD: 3.3 %
ERYTHROCYTE [DISTWIDTH] IN BLOOD BY AUTOMATED COUNT: 13.3 %
HCT VFR BLD AUTO: 36.2 %
HGB BLD-MCNC: 12.2 G/DL
LYMPHOCYTES # BLD AUTO: 1.9 K/UL
LYMPHOCYTES NFR BLD: 15.8 %
MCH RBC QN AUTO: 31.2 PG
MCHC RBC AUTO-ENTMCNC: 33.7 G/DL
MCV RBC AUTO: 93 FL
MONOCYTES # BLD AUTO: 1.2 K/UL
MONOCYTES NFR BLD: 9.6 %
NEUTROPHILS # BLD AUTO: 8.4 K/UL
NEUTROPHILS NFR BLD: 70.5 %
PLATELET # BLD AUTO: 165 K/UL
PMV BLD AUTO: 10.7 FL
RBC # BLD AUTO: 3.91 M/UL
WBC # BLD AUTO: 11.93 K/UL

## 2017-09-17 PROCEDURE — 99233 SBSQ HOSP IP/OBS HIGH 50: CPT | Mod: ,,, | Performed by: OBSTETRICS & GYNECOLOGY

## 2017-09-17 PROCEDURE — 36415 COLL VENOUS BLD VENIPUNCTURE: CPT

## 2017-09-17 PROCEDURE — 86140 C-REACTIVE PROTEIN: CPT

## 2017-09-17 PROCEDURE — 25000003 PHARM REV CODE 250: Performed by: STUDENT IN AN ORGANIZED HEALTH CARE EDUCATION/TRAINING PROGRAM

## 2017-09-17 PROCEDURE — 63600175 PHARM REV CODE 636 W HCPCS: Performed by: OBSTETRICS & GYNECOLOGY

## 2017-09-17 PROCEDURE — 11000001 HC ACUTE MED/SURG PRIVATE ROOM

## 2017-09-17 PROCEDURE — 85025 COMPLETE CBC W/AUTO DIFF WBC: CPT

## 2017-09-17 PROCEDURE — 25000003 PHARM REV CODE 250: Performed by: OBSTETRICS & GYNECOLOGY

## 2017-09-17 RX ORDER — POLYETHYLENE GLYCOL 3350 17 G/17G
17 POWDER, FOR SOLUTION ORAL DAILY
Status: DISCONTINUED | OUTPATIENT
Start: 2017-09-17 | End: 2017-09-19 | Stop reason: HOSPADM

## 2017-09-17 RX ORDER — SODIUM CHLORIDE, SODIUM LACTATE, POTASSIUM CHLORIDE, CALCIUM CHLORIDE 600; 310; 30; 20 MG/100ML; MG/100ML; MG/100ML; MG/100ML
INJECTION, SOLUTION INTRAVENOUS CONTINUOUS
Status: DISCONTINUED | OUTPATIENT
Start: 2017-09-17 | End: 2017-09-19 | Stop reason: HOSPADM

## 2017-09-17 RX ORDER — PROMETHAZINE HYDROCHLORIDE 12.5 MG/1
12.5 TABLET ORAL EVERY 6 HOURS PRN
Status: DISCONTINUED | OUTPATIENT
Start: 2017-09-17 | End: 2017-09-19 | Stop reason: HOSPADM

## 2017-09-17 RX ADMIN — SODIUM CHLORIDE, SODIUM LACTATE, POTASSIUM CHLORIDE, AND CALCIUM CHLORIDE: .6; .31; .03; .02 INJECTION, SOLUTION INTRAVENOUS at 08:09

## 2017-09-17 RX ADMIN — HYDROMORPHONE HYDROCHLORIDE 1 MG: 1 INJECTION, SOLUTION INTRAMUSCULAR; INTRAVENOUS; SUBCUTANEOUS at 02:09

## 2017-09-17 RX ADMIN — HYDROCODONE BITARTRATE AND ACETAMINOPHEN 1 TABLET: 10; 325 TABLET ORAL at 12:09

## 2017-09-17 RX ADMIN — IBUPROFEN 600 MG: 600 TABLET, FILM COATED ORAL at 11:09

## 2017-09-17 RX ADMIN — DOCUSATE SODIUM 200 MG: 100 CAPSULE, LIQUID FILLED ORAL at 08:09

## 2017-09-17 RX ADMIN — PROMETHAZINE HYDROCHLORIDE 12.5 MG: 12.5 TABLET ORAL at 08:09

## 2017-09-17 RX ADMIN — IBUPROFEN 600 MG: 600 TABLET, FILM COATED ORAL at 04:09

## 2017-09-17 RX ADMIN — POLYETHYLENE GLYCOL 3350 17 G: 17 POWDER, FOR SOLUTION ORAL at 11:09

## 2017-09-17 RX ADMIN — ONDANSETRON 8 MG: 8 TABLET, ORALLY DISINTEGRATING ORAL at 03:09

## 2017-09-17 RX ADMIN — HYDROCODONE BITARTRATE AND ACETAMINOPHEN 1 TABLET: 10; 325 TABLET ORAL at 05:09

## 2017-09-17 RX ADMIN — HYDROMORPHONE HYDROCHLORIDE 1 MG: 1 INJECTION, SOLUTION INTRAMUSCULAR; INTRAVENOUS; SUBCUTANEOUS at 11:09

## 2017-09-17 RX ADMIN — HYDROCODONE BITARTRATE AND ACETAMINOPHEN 1 TABLET: 10; 325 TABLET ORAL at 08:09

## 2017-09-17 RX ADMIN — HYDROCODONE BITARTRATE AND ACETAMINOPHEN 1 TABLET: 10; 325 TABLET ORAL at 01:09

## 2017-09-17 RX ADMIN — IBUPROFEN 600 MG: 600 TABLET, FILM COATED ORAL at 05:09

## 2017-09-17 RX ADMIN — HYDROMORPHONE HYDROCHLORIDE 1 MG: 1 INJECTION, SOLUTION INTRAMUSCULAR; INTRAVENOUS; SUBCUTANEOUS at 07:09

## 2017-09-17 RX ADMIN — IBUPROFEN 600 MG: 600 TABLET, FILM COATED ORAL at 10:09

## 2017-09-17 RX ADMIN — HYDROCODONE BITARTRATE AND ACETAMINOPHEN 1 TABLET: 10; 325 TABLET ORAL at 04:09

## 2017-09-17 NOTE — ASSESSMENT & PLAN NOTE
- S/p  delivery with placenta left in situ  - plan for interval hysterectomy in approx 4 weeks  - Episode of acute vaginal bleeding overnight resolved. Continue pad counts.

## 2017-09-17 NOTE — PLAN OF CARE
Problem: Patient Care Overview  Goal: Plan of Care Review  Outcome: Ongoing (interventions implemented as appropriate)  Pt tolerating PO well, no acute distress, ambulating and voiding without difficulty, bleeding scant, pain well controlled on prescribed meds. Incision healing well. Will continue to monitor.

## 2017-09-17 NOTE — PROGRESS NOTES
MD to bedside to evaluate vaginal bleeding. Patient reports having a gush of blood when using the restroom. She then saturated approximately 1/2 pad. She continues to have some bleeding presently. She denies abdominal pain or contractions.     Temp:  [96.8 °F (36 °C)-97.8 °F (36.6 °C)] 97.8 °F (36.6 °C)  Pulse:  [76-80] 80  Resp:  [16-18] 18  SpO2:  [100 %] 100 %  BP: (102-139)/(65-81) 139/74    Speculum: Mild pooling of bright red blood in vaginal vault      Plan:  - Gyn Onc staff on call paged  - Will place on pad counts to monitor bleeding  - NPO  - CBC and CRP drawn  - Will continue to monitor    Richar Shane MD  PGY-2 OB/GYN  [phone]    Discussed plan with CORDELIA who was in agreement.

## 2017-09-17 NOTE — PLAN OF CARE
Problem: Patient Care Overview  Goal: Plan of Care Review  Pt alert and VSS. Persistent pain moderately managed with PRN pain medications. 1x headache reported, medication given to good effect. @ 1800 pt reported bleeding while using the bathroom. Moderate bright red bleeding noted on pad and in toilet with minimal pain. Dr. Shane notified for immediate evaluation. Continuing to monitor bleeding hourly.

## 2017-09-17 NOTE — SUBJECTIVE & OBJECTIVE
Hospital course: 2017 - Admitted for scheduled  delivery at 34w0d. Classical  delivery performed, placenta left in situ. EBL 500cc. Patient taken to IR suite where UAE was performed without complication. Stable post-op.  2017 - Stable POD#1, no acute events.  09/15/2017 - POD#2, meeting milestones, working on pain control  2017 - POD#3,  Meeting milestones.   2017 - POD#4, patient had acute episode of bleeding overnight, filling 1/2 pad. Bright red blood was seen pooling in vaginal vault. She was placed on pad counts and Gyn Onc staff was notified. Bleeding then stopped overnight and she did not have any bleeding in the morning.       Interval History:   Pt with pain controlled today. Tolerating regular diet without N/V. . Urinating without difficulty, no blood in urine. Has passed only a small amount of flatus, and has not a BM. Bleeding has completely stopped from last night. Denies fever, chills, CP, SOB, palpitations.    Objective:     Vital Signs (Most Recent):  Temp: 98.5 °F (36.9 °C) (17)  Pulse: 91 (17)  Resp: 18 (17)  BP: 121/73 (17)  SpO2: (!) 92 % (17) Vital Signs (24h Range):  Temp:  [96.8 °F (36 °C)-98.5 °F (36.9 °C)] 98.5 °F (36.9 °C)  Pulse:  [71-91] 91  Resp:  [16-18] 18  SpO2:  [92 %-100 %] 92 %  BP: (102-139)/(65-81) 121/73     Weight: 88.4 kg (194 lb 14.2 oz)  Body mass index is 30.52 kg/m².      Intake/Output Summary (Last 24 hours) at 17 0639  Last data filed at 17 1900   Gross per 24 hour   Intake             1130 ml   Output             1400 ml   Net             -270 ml       Significant Labs:  Lab Results   Component Value Date    GROUPTRH O POS 2017    HEPBSAG Negative 2012    STREPBCULT Negative 2013       CBC/Anemia Labs: Coags:      Recent Labs  Lab 17  0532 17  05   WBC 15.77* 13.50* 11.93   HGB 12.9 12.6 12.2   HCT 38.6 37.4 36.2*    * 171 165   MCV 93 92 93   RDW 13.5 13.2 13.3      Recent Labs  Lab 09/13/17  1200 09/14/17  0532   INR 0.9 1.0   APTT 33.8* 33.5*            I have personallly reviewed all pertinent lab results from the last 24 hours.    Physical Exam:   Constitutional: She is oriented to person, place, and time. She appears well-developed and well-nourished.    HENT:   Head: Normocephalic and atraumatic.     Neck: Normal range of motion.    Cardiovascular: Normal rate, regular rhythm and normal heart sounds.     Pulmonary/Chest: Effort normal and breath sounds normal.        Abdominal: Soft. Bowel sounds are normal. She exhibits distension (mild, soft) and abdominal incision (dressing removed, staples in place, c/d/i). There is no tenderness.     Genitourinary:   Genitourinary Comments: No vaginal bleeding noted on pad           Musculoskeletal: Normal range of motion and moves all extremeties. She exhibits no edema.   KELLY/SCDs in place       Neurological: She is alert and oriented to person, place, and time. She has normal reflexes.    Skin: Skin is warm and dry.    Psychiatric: She has a normal mood and affect.

## 2017-09-17 NOTE — PROGRESS NOTES
[facility]  Obstetrics  Postpartum Progress Note    Patient Name: Louisa Morrow  MRN: [id number]  Admission Date: 2017  Hospital Length of Stay: 4 days  Attending Physician: Ismael Edwards MD  Primary Care Provider: Waylon Chery MD    Subjective:     Principal Problem: delivery delivered    Hospital course: 2017 - Admitted for scheduled  delivery at 34w0d. Classical  delivery performed, placenta left in situ. EBL 500cc. Patient taken to IR suite where UAE was performed without complication. Stable post-op.  2017 - Stable POD#1, no acute events.  09/15/2017 - POD#2, meeting milestones, working on pain control  2017 - POD#3,  Meeting milestones.   2017 - POD#4, patient had acute episode of bleeding overnight, filling 1/2 pad. Bright red blood was seen pooling in vaginal vault. She was placed on pad counts and Gyn Onc staff was notified. Bleeding then stopped overnight and she did not have any bleeding in the morning.       Interval History:   Pt with pain controlled today. Tolerating regular diet without N/V. . Urinating without difficulty, no blood in urine. Has passed only a small amount of flatus, and has not a BM. Bleeding has completely stopped from last night. Denies fever, chills, CP, SOB, palpitations.    Objective:     Vital Signs (Most Recent):  Temp: 98.5 °F (36.9 °C) (17)  Pulse: 91 (17)  Resp: 18 (17)  BP: 121/73 (17)  SpO2: (!) 92 % (17) Vital Signs (24h Range):  Temp:  [96.8 °F (36 °C)-98.5 °F (36.9 °C)] 98.5 °F (36.9 °C)  Pulse:  [71-91] 91  Resp:  [16-18] 18  SpO2:  [92 %-100 %] 92 %  BP: (102-139)/(65-81) 121/73     Weight: 88.4 kg (194 lb 14.2 oz)  Body mass index is 30.52 kg/m².      Intake/Output Summary (Last 24 hours) at 17 0639  Last data filed at 17 1900   Gross per 24 hour   Intake             1130 ml   Output             1400 ml   Net              -270 ml       Significant Labs:  Lab Results   Component Value Date    GROUPTRH O POS 2017    HEPBSAG Negative 2012    STREPBCULT Negative 2013       CBC/Anemia Labs: Coags:      Recent Labs  Lab 17  0532 17  0517   WBC 15.77* 13.50* 11.93   HGB 12.9 12.6 12.2   HCT 38.6 37.4 36.2*   * 171 165   MCV 93 92 93   RDW 13.5 13.2 13.3      Recent Labs  Lab 17  1200 17  0532   INR 0.9 1.0   APTT 33.8* 33.5*            I have personallly reviewed all pertinent lab results from the last 24 hours.    Physical Exam:   Constitutional: She is oriented to person, place, and time. She appears well-developed and well-nourished.    HENT:   Head: Normocephalic and atraumatic.     Neck: Normal range of motion.    Cardiovascular: Normal rate, regular rhythm and normal heart sounds.     Pulmonary/Chest: Effort normal and breath sounds normal.        Abdominal: Soft. Bowel sounds are normal. She exhibits distension (mild, soft) and abdominal incision (dressing removed, staples in place, c/d/i). There is no tenderness.     Genitourinary:   Genitourinary Comments: No vaginal bleeding noted on pad           Musculoskeletal: Normal range of motion and moves all extremeties. She exhibits no edema.   KELLY/SCDs in place       Neurological: She is alert and oriented to person, place, and time. She has normal reflexes.    Skin: Skin is warm and dry.    Psychiatric: She has a normal mood and affect.       Assessment/Plan:     32 y.o. female  for:    *  delivery delivered    Postpartum care:  - Patient doing well. Continue routine management and advances.  - Continue PO pain meds --> scheduled ibuprofen, Norco prn, dilaudid for BTP; simethicone TID and colace BID  - Encourage ambulation.   - Heme: Pre Delivery h/h  --> Immediately Post Delivery h/h  > stable at   - Coags wnl --> repeat coags POD#1 wnl.  Will repeat on Monday  - Contraception - planning  for interval hysterectomy  - Lactation - The patient is is NOT breast feeding  - Rh Status - positive          Placenta accreta    - S/p  delivery with placenta left in situ  - plan for interval hysterectomy in approx 4 weeks  - Episode of acute vaginal bleeding overnight resolved. Continue pad counts.             Disposition: As patient meets milestones, will plan to discharge POD #4.    Preet Shane MD  Obstetrics  [facility]    Patient continues to c/o menstrual like cramping.  Bleeding has decreased from yesterday.  CBC stable, CRP elevated likely 2.2 placental degeneration.

## 2017-09-17 NOTE — ASSESSMENT & PLAN NOTE
Postpartum care:  - Patient doing well. Continue routine management and advances.  - Continue PO pain meds --> scheduled ibuprofen, Norco prn, dilaudid for BTP; simethicone TID and colace BID  - Encourage ambulation.   - Heme: Pre Delivery h/h 13/41 --> Immediately Post Delivery h/h 12/38 > stable at 12/38  - Coags wnl --> repeat coags POD#1 wnl.  Will repeat on Monday  - Contraception - planning for interval hysterectomy  - Lactation - The patient is is NOT breast feeding  - Rh Status - positive

## 2017-09-17 NOTE — NURSING
Pt reported passing dime sized clots in toilet and bright red bleeding noted when wiping. Dr. Grover notified. Bowels not opened, Dr. Boyce aware. Will continue to monitor.

## 2017-09-18 LAB
APTT BLDCRRT: 34.3 SEC
BASOPHILS # BLD AUTO: 0.01 K/UL
BASOPHILS NFR BLD: 0.1 %
DIFFERENTIAL METHOD: ABNORMAL
EOSINOPHIL # BLD AUTO: 0.4 K/UL
EOSINOPHIL NFR BLD: 3.6 %
ERYTHROCYTE [DISTWIDTH] IN BLOOD BY AUTOMATED COUNT: 13 %
FIBRINOGEN PPP-MCNC: 626 MG/DL
HBV SURFACE AG SERPL QL IA: NEGATIVE
HCT VFR BLD AUTO: 37.3 %
HGB BLD-MCNC: 12.4 G/DL
HIV 1+2 AB+HIV1 P24 AG SERPL QL IA: NEGATIVE
INR PPP: 0.9
LYMPHOCYTES # BLD AUTO: 1.5 K/UL
LYMPHOCYTES NFR BLD: 14.3 %
MCH RBC QN AUTO: 30.5 PG
MCHC RBC AUTO-ENTMCNC: 33.2 G/DL
MCV RBC AUTO: 92 FL
MONOCYTES # BLD AUTO: 1.1 K/UL
MONOCYTES NFR BLD: 10.9 %
NEUTROPHILS # BLD AUTO: 7.2 K/UL
NEUTROPHILS NFR BLD: 70.2 %
PLATELET # BLD AUTO: 173 K/UL
PMV BLD AUTO: 10.6 FL
PROTHROMBIN TIME: 10 SEC
RBC # BLD AUTO: 4.06 M/UL
RPR SER QL: NORMAL
RUBV IGG SER-ACNC: 15.9 IU/ML
RUBV IGG SER-IMP: REACTIVE
WBC # BLD AUTO: 10.19 K/UL

## 2017-09-18 PROCEDURE — 85730 THROMBOPLASTIN TIME PARTIAL: CPT

## 2017-09-18 PROCEDURE — 99233 SBSQ HOSP IP/OBS HIGH 50: CPT | Mod: ,,, | Performed by: OBSTETRICS & GYNECOLOGY

## 2017-09-18 PROCEDURE — 25000003 PHARM REV CODE 250: Performed by: OBSTETRICS & GYNECOLOGY

## 2017-09-18 PROCEDURE — 11000001 HC ACUTE MED/SURG PRIVATE ROOM

## 2017-09-18 PROCEDURE — 85384 FIBRINOGEN ACTIVITY: CPT

## 2017-09-18 PROCEDURE — 36415 COLL VENOUS BLD VENIPUNCTURE: CPT

## 2017-09-18 PROCEDURE — 85610 PROTHROMBIN TIME: CPT

## 2017-09-18 PROCEDURE — 85025 COMPLETE CBC W/AUTO DIFF WBC: CPT

## 2017-09-18 RX ADMIN — IBUPROFEN 600 MG: 600 TABLET, FILM COATED ORAL at 06:09

## 2017-09-18 RX ADMIN — IBUPROFEN 600 MG: 600 TABLET, FILM COATED ORAL at 11:09

## 2017-09-18 RX ADMIN — HYDROCODONE BITARTRATE AND ACETAMINOPHEN 1 TABLET: 10; 325 TABLET ORAL at 04:09

## 2017-09-18 RX ADMIN — DOCUSATE SODIUM 200 MG: 100 CAPSULE, LIQUID FILLED ORAL at 08:09

## 2017-09-18 RX ADMIN — POLYETHYLENE GLYCOL 3350 17 G: 17 POWDER, FOR SOLUTION ORAL at 08:09

## 2017-09-18 RX ADMIN — HYDROCODONE BITARTRATE AND ACETAMINOPHEN 1 TABLET: 10; 325 TABLET ORAL at 12:09

## 2017-09-18 RX ADMIN — HYDROCODONE BITARTRATE AND ACETAMINOPHEN 1 TABLET: 10; 325 TABLET ORAL at 11:09

## 2017-09-18 RX ADMIN — IBUPROFEN 600 MG: 600 TABLET, FILM COATED ORAL at 12:09

## 2017-09-18 RX ADMIN — HYDROCODONE BITARTRATE AND ACETAMINOPHEN 1 TABLET: 10; 325 TABLET ORAL at 08:09

## 2017-09-18 RX ADMIN — HYDROCODONE BITARTRATE AND ACETAMINOPHEN 1 TABLET: 5; 325 TABLET ORAL at 08:09

## 2017-09-18 NOTE — ASSESSMENT & PLAN NOTE
- S/p  delivery with placenta left in situ  - plan for interval hysterectomy in approx 4 weeks  - Episode of acute vaginal bleeding Sat night, appears resolved. Continue pad counts.

## 2017-09-18 NOTE — SUBJECTIVE & OBJECTIVE
Hospital course: 2017 - Admitted for scheduled  delivery at 34w0d. Classical  delivery performed, placenta left in situ. EBL 500cc. Patient taken to IR suite where UAE was performed without complication. Stable post-op.  2017 - Stable POD#1, no acute events.  09/15/2017 - POD#2, meeting milestones, working on pain control  2017 - POD#3,  Meeting milestones.   2017 - POD#4, patient had acute episode of bleeding overnight, filling 1/2 pad. Bright red blood was seen pooling in vaginal vault. She was placed on pad counts and Gyn Onc staff was notified. Bleeding then stopped overnight and she did not have any bleeding in the morning.   2017 - POD#5. Doing well VB decreased to old brown blood. Meeting all post-op milestones, awaiting BM.      Interval History:   Reports good pain control overnight. Tolerating regular diet without N/V. Urinating without difficulty, no blood in urine. Has passed only a small amount of flatus, and has not a BM. Last episode of bright red bleeding Saturday night, has decreased to old brown blood since that time. She has the same pad on over night shift. Denies fever, chills, CP, SOB, palpitations.    Objective:     Vital Signs (Most Recent):  Temp: 98.2 °F (36.8 °C) (17)  Pulse: 72 (17)  Resp: 18 (17)  BP: 120/61 (17)  SpO2: 98 % (17) Vital Signs (24h Range):  Temp:  [96.5 °F (35.8 °C)-98.6 °F (37 °C)] 98.2 °F (36.8 °C)  Pulse:  [70-91] 72  Resp:  [16-18] 18  SpO2:  [98 %-100 %] 98 %  BP: (120-133)/(58-74) 120/61     Weight: 88.4 kg (194 lb 14.2 oz)  Body mass index is 30.52 kg/m².    No intake or output data in the 24 hours ending 17 0648    Significant Labs:  Lab Results   Component Value Date    [facility] O POS 2017    HEPBSAG Negative 2012    STREPBCULT Negative 2013       CBC/Anemia Labs: Coags:      Recent Labs  Lab 17  05    WBC 13.50* 11.93 10.19   HGB 12.6 12.2 12.4   HCT 37.4 36.2* 37.3    165 173   MCV 92 93 92   RDW 13.2 13.3 13.0      Recent Labs  Lab 09/13/17  1200 09/14/17  0532 09/18/17  0525   INR 0.9 1.0 0.9   APTT 33.8* 33.5* 34.3*            I have personallly reviewed all pertinent lab results from the last 24 hours.    Physical Exam:   Constitutional: She is oriented to person, place, and time. She appears well-developed and well-nourished.    HENT:   Head: Normocephalic and atraumatic.     Neck: Normal range of motion.    Cardiovascular: Normal rate, regular rhythm and normal heart sounds.     Pulmonary/Chest: Effort normal and breath sounds normal.        Abdominal: Soft. Bowel sounds are normal. She exhibits distension (mild, soft) and abdominal incision (dressing removed, staples in place, c/d/i). There is no tenderness.     Genitourinary:   Genitourinary Comments: Pad stained 25% brown blood (same pad x 12h)           Musculoskeletal: Normal range of motion and moves all extremeties. She exhibits no edema.   KELLY/SCDs in place       Neurological: She is alert and oriented to person, place, and time.    Skin: Skin is warm and dry.    Psychiatric: She has a normal mood and affect.

## 2017-09-18 NOTE — PLAN OF CARE
Problem: Patient Care Overview  Goal: Plan of Care Review  Outcome: Ongoing (interventions implemented as appropriate)  Plan of care reviewed with pt. All questions were answered. Made available throughout the shift to answer any additional questions. Pt stating she feels much better after being able to get up and take a shower. VSS. Pain managed to acceptable levels with PRN pain medication. Pt did very well overnight and only needed 2 doses of pain medication. Pt still has not had bowel movement. Encouraged adequate fluid intake, adequate fiber intake, ambulation. Also educated that pain and nausea medication can cause constipation. Pt verbalized understanding. Pt with no bleeding other than old brown spotting not requiring any pad changes. Plan to discharge patient in the near future if bleeding remains stable and hysterectomy in 4 weeks. Will update POC as needed.

## 2017-09-18 NOTE — PROGRESS NOTES
[facility]  Obstetrics  Postpartum Progress Note    Patient Name: Louisa Morrow  MRN: [id number]  Admission Date: 2017  Hospital Length of Stay: 5 days  Attending Physician: Ismael Edwards MD  Primary Care Provider: Waylon Chery MD    Subjective:     Principal Problem: delivery delivered    Hospital course: 2017 - Admitted for scheduled  delivery at 34w0d. Classical  delivery performed, placenta left in situ. EBL 500cc. Patient taken to IR suite where UAE was performed without complication. Stable post-op.  2017 - Stable POD#1, no acute events.  09/15/2017 - POD#2, meeting milestones, working on pain control  2017 - POD#3,  Meeting milestones.   2017 - POD#4, patient had acute episode of bleeding overnight, filling 1/2 pad. Bright red blood was seen pooling in vaginal vault. She was placed on pad counts and Gyn Onc staff was notified. Bleeding then stopped overnight and she did not have any bleeding in the morning.   2017 - POD#5. Doing well VB decreased to old brown blood. Meeting all post-op milestones, awaiting BM.      Interval History:   Reports good pain control overnight. Tolerating regular diet without N/V. Urinating without difficulty, no blood in urine. Has passed only a small amount of flatus, and has not a BM. Last episode of bright red bleeding Saturday night, has decreased to old brown blood since that time. She has the same pad on over night shift. Denies fever, chills, CP, SOB, palpitations.    Objective:     Vital Signs (Most Recent):  Temp: 98.2 °F (36.8 °C) (17)  Pulse: 72 (17)  Resp: 18 (17)  BP: 120/61 (17)  SpO2: 98 % (17) Vital Signs (24h Range):  Temp:  [96.5 °F (35.8 °C)-98.6 °F (37 °C)] 98.2 °F (36.8 °C)  Pulse:  [70-91] 72  Resp:  [16-18] 18  SpO2:  [98 %-100 %] 98 %  BP: (120-133)/(58-74) 120/61     Weight: 88.4 kg (194 lb 14.2 oz)  Body mass  index is 30.52 kg/m².    No intake or output data in the 24 hours ending 17 0648    Significant Labs:  Lab Results   Component Value Date    GROUPTRH O POS 2017    HEPBSAG Negative 2012    STREPBCULT Negative 2013       CBC/Anemia Labs: Coags:      Recent Labs  Lab 17  2032 17  0517 17  0525   WBC 13.50* 11.93 10.19   HGB 12.6 12.2 12.4   HCT 37.4 36.2* 37.3    165 173   MCV 92 93 92   RDW 13.2 13.3 13.0      Recent Labs  Lab 17  1200 17  0532 17  0525   INR 0.9 1.0 0.9   APTT 33.8* 33.5* 34.3*            I have personallly reviewed all pertinent lab results from the last 24 hours.    Physical Exam:   Constitutional: She is oriented to person, place, and time. She appears well-developed and well-nourished.    HENT:   Head: Normocephalic and atraumatic.     Neck: Normal range of motion.    Cardiovascular: Normal rate, regular rhythm and normal heart sounds.     Pulmonary/Chest: Effort normal and breath sounds normal.        Abdominal: Soft. Bowel sounds are normal. She exhibits distension (mild, soft) and abdominal incision (dressing removed, staples in place, c/d/i). There is no tenderness.     Genitourinary:   Genitourinary Comments: Pad stained 25% brown blood (same pad x 12h)           Musculoskeletal: Normal range of motion and moves all extremeties. She exhibits no edema.   KELLY/SCDs in place       Neurological: She is alert and oriented to person, place, and time.    Skin: Skin is warm and dry.    Psychiatric: She has a normal mood and affect.       Assessment/Plan:     32 y.o. female  for:    *  delivery delivered    Postpartum care:  - Patient doing well. Continue routine management and advances.  - Continue PO pain meds --> scheduled ibuprofen, Norco prn, dilaudid for BTP; simethicone TID and colace BID; has been requiring IV dilaudid, will consider discontinuing today  - Encourage ambulation.   - Heme: Pre Delivery h/h   --> Immediately Post Delivery h/h 12/38 > stable at 12/38  - Coags wnl --> repeat coags POD#1 wnl.  This morning coags stable (PT/INR 10/0.9, PTT 34.3, fibrinogen 626)  - Contraception - planning for interval hysterectomy  - Lactation - The patient is is NOT breast feeding  - Rh Status - positive          Placenta accreta    - S/p  delivery with placenta left in situ  - plan for interval hysterectomy in approx 4 weeks  - Episode of acute vaginal bleeding Sat night, appears resolved. Continue pad counts.             Disposition: As patient meets milestones, will plan to discharge POD# 5-6 if bleeding stable.    Zahra Carvalho MD  Obstetrics  [facility]

## 2017-09-18 NOTE — ASSESSMENT & PLAN NOTE
Postpartum care:  - Patient doing well. Continue routine management and advances.  - Continue PO pain meds --> scheduled ibuprofen, Norco prn, dilaudid for BTP; simethicone TID and colace BID; has been requiring IV dilaudid, will consider discontinuing today  - Encourage ambulation.   - Heme: Pre Delivery h/h 13/41 --> Immediately Post Delivery h/h 12/38 > stable at 12/38  - Coags wnl --> repeat coags POD#1 wnl.  This morning coags stable (PT/INR 10/0.9, PTT 34.3, fibrinogen 626)  - Contraception - planning for interval hysterectomy  - Lactation - The patient is is NOT breast feeding  - Rh Status - positive

## 2017-09-18 NOTE — PLAN OF CARE
Problem: Patient Care Overview  Goal: Plan of Care Review  Pt alert and VSS. Pt complaining of moderate abdominal incisional pain, PRN medication given to good effect. Pt had 1 x episode of passing bright red clots this AM; has has scant brown blood on pad since. Pt visited baby in NICU today. Denies headache and BV. Nil further concerns at time of report.

## 2017-09-18 NOTE — PROGRESS NOTES
Pt has had one pad on since beginning of shift. Only brown spotting noted to pad. Dr. Miguel notified.

## 2017-09-19 VITALS
HEIGHT: 67 IN | RESPIRATION RATE: 18 BRPM | SYSTOLIC BLOOD PRESSURE: 124 MMHG | WEIGHT: 194.88 LBS | DIASTOLIC BLOOD PRESSURE: 71 MMHG | TEMPERATURE: 98 F | OXYGEN SATURATION: 98 % | HEART RATE: 75 BPM | BODY MASS INDEX: 30.59 KG/M2

## 2017-09-19 PROCEDURE — 25000003 PHARM REV CODE 250: Performed by: OBSTETRICS & GYNECOLOGY

## 2017-09-19 PROCEDURE — 99238 HOSP IP/OBS DSCHRG MGMT 30/<: CPT | Mod: ,,, | Performed by: OBSTETRICS & GYNECOLOGY

## 2017-09-19 RX ORDER — IBUPROFEN 600 MG/1
600 TABLET ORAL EVERY 6 HOURS
Qty: 30 TABLET | Refills: 2 | Status: ON HOLD | OUTPATIENT
Start: 2017-09-19 | End: 2017-09-29 | Stop reason: HOSPADM

## 2017-09-19 RX ORDER — HYDROCODONE BITARTRATE AND ACETAMINOPHEN 5; 325 MG/1; MG/1
1 TABLET ORAL EVERY 4 HOURS PRN
Qty: 30 TABLET | Refills: 0 | Status: ON HOLD | OUTPATIENT
Start: 2017-09-19 | End: 2017-09-29

## 2017-09-19 RX ADMIN — HYDROCODONE BITARTRATE AND ACETAMINOPHEN 1 TABLET: 10; 325 TABLET ORAL at 10:09

## 2017-09-19 RX ADMIN — ONDANSETRON 8 MG: 8 TABLET, ORALLY DISINTEGRATING ORAL at 07:09

## 2017-09-19 RX ADMIN — POLYETHYLENE GLYCOL 3350 17 G: 17 POWDER, FOR SOLUTION ORAL at 10:09

## 2017-09-19 RX ADMIN — IBUPROFEN 600 MG: 600 TABLET, FILM COATED ORAL at 06:09

## 2017-09-19 RX ADMIN — HYDROCODONE BITARTRATE AND ACETAMINOPHEN 1 TABLET: 10; 325 TABLET ORAL at 06:09

## 2017-09-19 RX ADMIN — DOCUSATE SODIUM 200 MG: 100 CAPSULE, LIQUID FILLED ORAL at 10:09

## 2017-09-19 NOTE — ASSESSMENT & PLAN NOTE
- S/p  delivery with UAE, placenta left in situ  - plan for interval hysterectomy in approx 4 weeks  - Episode of acute vaginal bleeding Sat night, appears resolved. Continue pad counts.

## 2017-09-19 NOTE — PLAN OF CARE
Problem: Patient Care Overview  Goal: Plan of Care Review  Outcome: Outcome(s) achieved Date Met: 09/19/17  AVS and strict activity precautions reviewed at bedside, pt verbalized understanding. Scripts delivered to room, pt aware of when to follow up with Dr Small. Denies pain, cramping and bleeding at time of d/c. VSS. States that she has no further questions at this time.

## 2017-09-19 NOTE — ASSESSMENT & PLAN NOTE
Postpartum care:  - Patient doing well. Continue routine management and advances.  - Continue PO pain meds --> scheduled ibuprofen, Norco prn; simethicone TID and colace BID  - Encourage ambulation.   - Heme: Pre Delivery h/h 13/41 --> Immediately Post Delivery h/h 12/38 > stable at 12/38  - Coags wnl --> repeat coags POD#1 wnl.  9/18 coags stable (PT/INR 10/0.9, PTT 34.3, fibrinogen 626)  - Contraception - planning for interval hysterectomy  - Lactation - The patient is is NOT breast feeding  - Rh Status - positive

## 2017-09-19 NOTE — DISCHARGE SUMMARY
[facility]  Obstetrics  Discharge Summary      Patient Name: Louisa Morrow  MRN: [id number]  Admission Date: 2017  Hospital Length of Stay: 6 days  Discharge Date and Time:  2017 10:45 AM  Attending Physician: Ismael Edwards MD   Discharging Provider: Thalia Cassidy MD  Primary Care Provider: Waylon Chery MD    HPI:  Louisa Morrow is a 32 y.o.  female with IUP at 34w0d gestation who is admitted for scheduled  delivery secondary to placenta previa with anterior placenta accreta. Patient has a history of 2 prior  deliveries. Planning to leave the placenta insitu if possible followed by UAE, then interval hysterectomy.    Patient has no questions this morning and fully understands plan. She has no complaints. Patient denies contractions, denies vaginal bleeding, denies LOF.   Fetal Movement: normal.       Procedure(s) (LRB):  EMBOLIZATION (N/A)     Hospital Course:   2017 - Admitted for scheduled  delivery at 34w0d. Classical  delivery performed, placenta left in situ. EBL 500cc. Patient taken to IR suite where UAE was performed without complication. Stable post-op.  2017 - Stable POD#1, no acute events.  09/15/2017 - POD#2, meeting milestones, working on pain control  2017 - POD#3,  Meeting milestones.   2017 - POD#4, patient had acute episode of bleeding overnight, filling 1/2 pad. Bright red blood was seen pooling in vaginal vault. She was placed on pad counts and Gyn Onc staff was notified. Bleeding then stopped overnight and she did not have any bleeding in the morning.   2017 - POD#5. Doing well VB decreased to old brown blood. Meeting all post-op milestones, awaiting BM.  2017- POD#6. VB minimal. Meeting post operative milestones.     Patient is stable for discharge. She has been given strict bleeding precautions and voiced understanding. She is to follow-up with Dr. Small in clinic  on  for staple removal and postoperative appointment.    Consults         Status Ordering Provider     Inpatient consult to Interventional Radiology  Once     Provider:  MD Raul Ewing ERIN          Final Active Diagnoses:    Diagnosis Date Noted POA    PRINCIPAL PROBLEM:   delivery delivered [O82] 2017 No    Placenta accreta [O43.219] 2017 Yes    Placenta previa antepartum in second trimester [O44.02] 2017 Yes      Problems Resolved During this Admission:    Diagnosis Date Noted Date Resolved POA    34 weeks gestation of pregnancy [Z3A.34] 2017 Not Applicable    Partial placenta previa [O44.20] 2017 Yes        Labs:     Recent Labs  Lab 17  0517 17  0525   WBC 13.50* 11.93 10.19   HGB 12.6 12.2 12.4   HCT 37.4 36.2* 37.3   MCV 92 93 92    165 173       and INR   Lab Results   Component Value Date    INR 0.9 2017    INR 1.0 2017    INR 0.9 2017       Feeding Method: bottle    Immunizations     Date Immunization Status Dose Route/Site Given by    17 1405 MMR Incomplete 0.5 mL Subcutaneous/Left deltoid     17 1405 Tdap Incomplete 0.5 mL Intramuscular/Left deltoid           Delivery:    Episiotomy: None   Lacerations: None   Repair suture:     Repair # of packets:     Blood loss (ml): 0     Birth information:  YOB: 2017   Time of birth: 8:43 AM   Sex: male   Delivery type: , Classical   Gestational Age: 34w0d    Delivery Clinician:      Other providers:       Additional  information:  Forceps:    Vacuum:    Breech:    Observed anomalies      Living?:           APGARS  One minute Five minutes Ten minutes   Skin color:         Heart rate:         Grimace:         Muscle tone:         Breathing:         Totals: 9  9        Placenta: Delivered:       appearance    Pending Diagnostic Studies:     None          Discharged Condition:  good    Disposition: Home or Self Care    Follow Up:  Follow-up Information     Garret Small MD On 9/25/2017.    Specialties:  Obstetrics, Gynecology, Gynecologic Oncology  Why:  Post Operative Appointment  Contact information:  [address]  [place] [id number] [phone]                 Patient Instructions:     Diet general     Activity as tolerated     Other restrictions (specify):   Order Comments: Pelvic rest (no tampons, intercourse, etc.) until cleared by Dr. Small or Dr. Edwards.     Call MD for:  increased confusion or weakness     Call MD for:  persistent dizziness, light-headedness, or visual disturbances     Call MD for:  worsening rash     Call MD for:  severe persistent headache     Call MD for:  difficulty breathing or increased cough     Call MD for:  redness, tenderness, or signs of infection (pain, swelling, redness, odor or green/yellow discharge around incision site)     Call MD for:  severe uncontrolled pain     Call MD for:  persistent nausea and vomiting or diarrhea     Call MD for:  temperature >100.4       Medications:  Current Discharge Medication List      START taking these medications    Details   hydrocodone-acetaminophen 5-325mg (NORCO) 5-325 mg per tablet Take 1 tablet by mouth every 4 (four) hours as needed.  Qty: 30 tablet, Refills: 0      ibuprofen (ADVIL,MOTRIN) 600 MG tablet Take 1 tablet (600 mg total) by mouth every 6 (six) hours.  Qty: 30 tablet, Refills: 2         CONTINUE these medications which have NOT CHANGED    Details   PNV,CALCIUM 72/IRON/FOLIC ACID (PRENATAL VITAMIN) Tab Take 1 tablet by mouth once daily.      promethazine (PHENERGAN) 25 MG tablet Take 25 mg by mouth every 4 (four) hours.             Thalia Cassidy MD  Obstetrics  [facility]

## 2017-09-19 NOTE — SUBJECTIVE & OBJECTIVE
Hospital course: 2017 - Admitted for scheduled  delivery at 34w0d. Classical  delivery performed, placenta left in situ. EBL 500cc. Patient taken to IR suite where UAE was performed without complication. Stable post-op.  2017 - Stable POD#1, no acute events.  09/15/2017 - POD#2, meeting milestones, working on pain control  2017 - POD#3,  Meeting milestones.   2017 - POD#4, patient had acute episode of bleeding overnight, filling 1/2 pad. Bright red blood was seen pooling in vaginal vault. She was placed on pad counts and Gyn Onc staff was notified. Bleeding then stopped overnight and she did not have any bleeding in the morning.   2017 - POD#5. Doing well VB decreased to old brown blood. Meeting all post-op milestones, awaiting BM.  2017- POD#6. VB minimal. Meeting post operative milestones.       Interval History:   Reports good pain control overnight. Tolerating regular diet without N/V. Urinating without difficulty, blood in urine resolved. She also reports vaginal bleeding has improved and is very minimal. She reports pain is well controlled.  She is ambulating. She reports passing flatus.  She has had a BM. Denies fever, chills, CP, SOB, palpitations.    Objective:     Vital Signs (Most Recent):  Temp: 97.6 °F (36.4 °C) (17 0602)  Pulse: 74 (17 0602)  Resp: 17 (17 0602)  BP: 118/63 (17 0602)  SpO2: 98 % (17 0425) Vital Signs (24h Range):  Temp:  [97.2 °F (36.2 °C)-97.6 °F (36.4 °C)] 97.6 °F (36.4 °C)  Pulse:  [61-80] 74  Resp:  [17-18] 17  BP: (106-139)/(55-76) 118/63     Weight: 88.4 kg (194 lb 14.2 oz)  Body mass index is 30.52 kg/m².    No intake or output data in the 24 hours ending 17 0700    Significant Labs:  Lab Results   Component Value Date    [facility]    HEPBSAG Negative 09/15/2017    STREPBCULT Negative 2013       CBC/Anemia Labs: Coags:      Recent Labs  Lab 17  0517  09/18/17  0525   WBC 13.50* 11.93 10.19   HGB 12.6 12.2 12.4   HCT 37.4 36.2* 37.3    165 173   MCV 92 93 92   RDW 13.2 13.3 13.0      Recent Labs  Lab 09/13/17  1200 09/14/17  0532 09/18/17  0525   INR 0.9 1.0 0.9   APTT 33.8* 33.5* 34.3*            I have personallly reviewed all pertinent lab results from the last 24 hours.    Physical Exam:   Constitutional: She is oriented to person, place, and time. She appears well-developed and well-nourished.    HENT:   Head: Normocephalic and atraumatic.     Neck: Normal range of motion.    Cardiovascular: Normal rate, regular rhythm and normal heart sounds.     Pulmonary/Chest: Effort normal and breath sounds normal.        Abdominal: Soft. Bowel sounds are normal. She exhibits distension (mild, soft) and abdominal incision (dressing removed, staples in place, c/d/i). There is no tenderness.     Genitourinary:   Genitourinary Comments: Pad in place since 9PM. No blood present on pad.            Musculoskeletal: Normal range of motion and moves all extremeties. She exhibits no edema.   KELLY/SCDs in place       Neurological: She is alert and oriented to person, place, and time.    Skin: Skin is warm and dry.    Psychiatric: She has a normal mood and affect.

## 2017-09-19 NOTE — PLAN OF CARE
Problem: Patient Care Overview  Goal: Plan of Care Review  VSS.  Pt ambulating and voiding without difficulty.  Vaginal bleeding scant and brown in color.  Pain controlled well by PO medication.  Pt reports passing BM yesterday.  Plan of discharge today reviewed with patient.  Denies needs at this time.

## 2017-09-19 NOTE — PROGRESS NOTES
[facility]  Obstetrics  Postpartum Progress Note    Patient Name: Louisa Morrow  MRN: [id number]  Admission Date: 2017  Hospital Length of Stay: 6 days  Attending Physician: Ismael Edwards MD  Primary Care Provider: Waylon Chery MD    Subjective:     Principal Problem: delivery delivered    Hospital course: 2017 - Admitted for scheduled  delivery at 34w0d. Classical  delivery performed, placenta left in situ. EBL 500cc. Patient taken to IR suite where UAE was performed without complication. Stable post-op.  2017 - Stable POD#1, no acute events.  09/15/2017 - POD#2, meeting milestones, working on pain control  2017 - POD#3,  Meeting milestones.   2017 - POD#4, patient had acute episode of bleeding overnight, filling 1/2 pad. Bright red blood was seen pooling in vaginal vault. She was placed on pad counts and Gyn Onc staff was notified. Bleeding then stopped overnight and she did not have any bleeding in the morning.   2017 - POD#5. Doing well VB decreased to old brown blood. Meeting all post-op milestones, awaiting BM.  2017- POD#6. VB minimal. Meeting post operative milestones.       Interval History:   Reports good pain control overnight. Tolerating regular diet without N/V. Urinating without difficulty, blood in urine resolved. She also reports vaginal bleeding has improved and is very minimal. She reports pain is well controlled.  She is ambulating. She reports passing flatus.  She has had a BM. Denies fever, chills, CP, SOB, palpitations.    Objective:     Vital Signs (Most Recent):  Temp: 97.6 °F (36.4 °C) (17)  Pulse: 74 (17 06)  Resp: 17 (17)  BP: 118/63 (17)  SpO2: 98 % (17 0425) Vital Signs (24h Range):  Temp:  [97.2 °F (36.2 °C)-97.6 °F (36.4 °C)] 97.6 °F (36.4 °C)  Pulse:  [61-80] 74  Resp:  [17-18] 17  BP: (106-139)/(55-76) 118/63     Weight: 88.4 kg (194 lb 14.2  oz)  Body mass index is 30.52 kg/m².    No intake or output data in the 24 hours ending 17 0700    Significant Labs:  Lab Results   Component Value Date    GROUPTRH O POS 2017    HEPBSAG Negative 09/15/2017    STREPBCULT Negative 2013       CBC/Anemia Labs: Coags:      Recent Labs  Lab 17  2032 17  0517 17  0525   WBC 13.50* 11.93 10.19   HGB 12.6 12.2 12.4   HCT 37.4 36.2* 37.3    165 173   MCV 92 93 92   RDW 13.2 13.3 13.0      Recent Labs  Lab 17  1200 17  0532 17  0525   INR 0.9 1.0 0.9   APTT 33.8* 33.5* 34.3*            I have personallly reviewed all pertinent lab results from the last 24 hours.    Physical Exam:   Constitutional: She is oriented to person, place, and time. She appears well-developed and well-nourished.    HENT:   Head: Normocephalic and atraumatic.     Neck: Normal range of motion.    Cardiovascular: Normal rate, regular rhythm and normal heart sounds.     Pulmonary/Chest: Effort normal and breath sounds normal.        Abdominal: Soft. Bowel sounds are normal. She exhibits distension (mild, soft) and abdominal incision (dressing removed, staples in place, c/d/i). There is no tenderness.     Genitourinary:   Genitourinary Comments: Pad in place since 9PM. No blood present on pad.            Musculoskeletal: Normal range of motion and moves all extremeties. She exhibits no edema.   KELLY/SCDs in place       Neurological: She is alert and oriented to person, place, and time.    Skin: Skin is warm and dry.    Psychiatric: She has a normal mood and affect.       Assessment/Plan:     32 y.o. female  for:    *  delivery delivered    Postpartum care:  - Patient doing well. Continue routine management and advances.  - Continue PO pain meds --> scheduled ibuprofen, Norco prn; simethicone TID and colace BID  - Encourage ambulation.   - Heme: Pre Delivery h/h  --> Immediately Post Delivery h/h  > stable at   - Coags  wnl --> repeat coags POD#1 wnl.  9/18 coags stable (PT/INR 10/0.9, PTT 34.3, fibrinogen 626)  - Contraception - planning for interval hysterectomy  - Lactation - The patient is is NOT breast feeding  - Rh Status - positive          Placenta accreta    - S/p  delivery with UAE, placenta left in situ  - plan for interval hysterectomy in approx 4 weeks  - Episode of acute vaginal bleeding Sat night, appears resolved. Continue pad counts.             Disposition: As patient meets milestones, anticipate D/C later today.     Thalia Cassidy MD  Obstetrics  [facility]

## 2017-09-20 LAB
BLD PROD TYP BPU: NORMAL
BLOOD UNIT EXPIRATION DATE: NORMAL
BLOOD UNIT TYPE CODE: 5100
BLOOD UNIT TYPE: NORMAL
CODING SYSTEM: NORMAL
DISPENSE STATUS: NORMAL
TRANS ERYTHROCYTES VOL PATIENT: NORMAL ML

## 2017-09-22 ENCOUNTER — TELEPHONE (OUTPATIENT)
Dept: GYNECOLOGIC ONCOLOGY | Facility: CLINIC | Age: 32
End: 2017-09-22

## 2017-09-22 NOTE — TELEPHONE ENCOUNTER
----- Message from Anu Giraldo sent at 9/22/2017  9:02 AM CDT -----  Louisa Mrorow said she had high fever last night & this morning she is alright/pt can be reached at        [facility]# [id number]

## 2017-09-22 NOTE — TELEPHONE ENCOUNTER
09/22/17 pt c/o a higher fever times 2 days 100.3. Pt took Ibuprofen and fever went away. Fever is happening at night. Advised pt per .night fevers is normal after surg. Cont to watch for now. If fever persist are get any higher than 100.4 go to ER. Pt verbalized understanding. OSMAR/MA

## 2017-09-24 ENCOUNTER — NURSE TRIAGE (OUTPATIENT)
Dept: ADMINISTRATIVE | Facility: CLINIC | Age: 32
End: 2017-09-24

## 2017-09-25 ENCOUNTER — ANESTHESIA (OUTPATIENT)
Dept: SURGERY | Facility: OTHER | Age: 32
DRG: 769 | End: 2017-09-25
Payer: MEDICAID

## 2017-09-25 ENCOUNTER — HOSPITAL ENCOUNTER (INPATIENT)
Facility: OTHER | Age: 32
LOS: 5 days | Discharge: HOME | DRG: 769 | End: 2017-09-30
Attending: OBSTETRICS & GYNECOLOGY | Admitting: OBSTETRICS & GYNECOLOGY
Payer: MEDICAID

## 2017-09-25 ENCOUNTER — TELEPHONE (OUTPATIENT)
Dept: OBSTETRICS AND GYNECOLOGY | Facility: HOSPITAL | Age: 32
End: 2017-09-25

## 2017-09-25 ENCOUNTER — ANESTHESIA EVENT (OUTPATIENT)
Dept: SURGERY | Facility: OTHER | Age: 32
DRG: 769 | End: 2017-09-25
Payer: MEDICAID

## 2017-09-25 ENCOUNTER — SURGERY (OUTPATIENT)
Age: 32
End: 2017-09-25

## 2017-09-25 DIAGNOSIS — Z90.710 S/P TAH (TOTAL ABDOMINAL HYSTERECTOMY): Primary | ICD-10-CM

## 2017-09-25 DIAGNOSIS — O43.213 PLACENTA ACCRETA, THIRD TRIMESTER: ICD-10-CM

## 2017-09-25 DIAGNOSIS — N93.9 VAGINAL BLEEDING: ICD-10-CM

## 2017-09-25 LAB
ABO + RH BLD: NORMAL
ALBUMIN SERPL BCP-MCNC: 2.1 G/DL
ALP SERPL-CCNC: 313 U/L
ALT SERPL W/O P-5'-P-CCNC: 13 U/L
ANION GAP SERPL CALC-SCNC: 12 MMOL/L
ANION GAP SERPL CALC-SCNC: 8 MMOL/L
ANISOCYTOSIS BLD QL SMEAR: SLIGHT
APTT BLDCRRT: 30.7 SEC
APTT BLDCRRT: 32.2 SEC
AST SERPL-CCNC: 20 U/L
BASO STIPL BLD QL SMEAR: ABNORMAL
BASOPHILS # BLD AUTO: ABNORMAL K/UL
BASOPHILS # BLD AUTO: ABNORMAL K/UL
BASOPHILS NFR BLD: 0 %
BASOPHILS NFR BLD: 0 %
BILIRUB SERPL-MCNC: 0.6 MG/DL
BLD GP AB SCN CELLS X3 SERPL QL: NORMAL
BLD PROD TYP BPU: NORMAL
BLOOD UNIT EXPIRATION DATE: NORMAL
BLOOD UNIT TYPE CODE: 6200
BLOOD UNIT TYPE: NORMAL
BUN SERPL-MCNC: 6 MG/DL
BUN SERPL-MCNC: 8 MG/DL
CALCIUM SERPL-MCNC: 7.3 MG/DL
CALCIUM SERPL-MCNC: 7.8 MG/DL
CHLORIDE SERPL-SCNC: 112 MMOL/L
CHLORIDE SERPL-SCNC: 112 MMOL/L
CO2 SERPL-SCNC: 13 MMOL/L
CO2 SERPL-SCNC: 18 MMOL/L
CODING SYSTEM: NORMAL
CREAT SERPL-MCNC: 0.5 MG/DL
CREAT SERPL-MCNC: 0.6 MG/DL
DIFFERENTIAL METHOD: ABNORMAL
DIFFERENTIAL METHOD: ABNORMAL
DISPENSE STATUS: NORMAL
DOHLE BOD BLD QL SMEAR: PRESENT
EOSINOPHIL # BLD AUTO: ABNORMAL K/UL
EOSINOPHIL # BLD AUTO: ABNORMAL K/UL
EOSINOPHIL NFR BLD: 0 %
EOSINOPHIL NFR BLD: 0 %
ERYTHROCYTE [DISTWIDTH] IN BLOOD BY AUTOMATED COUNT: 12.6 %
ERYTHROCYTE [DISTWIDTH] IN BLOOD BY AUTOMATED COUNT: 12.9 %
EST. GFR  (AFRICAN AMERICAN): >60 ML/MIN/1.73 M^2
EST. GFR  (AFRICAN AMERICAN): >60 ML/MIN/1.73 M^2
EST. GFR  (NON AFRICAN AMERICAN): >60 ML/MIN/1.73 M^2
EST. GFR  (NON AFRICAN AMERICAN): >60 ML/MIN/1.73 M^2
FIBRINOGEN PPP-MCNC: 387 MG/DL
FIBRINOGEN PPP-MCNC: 456 MG/DL
GIANT PLATELETS BLD QL SMEAR: PRESENT
GLUCOSE SERPL-MCNC: 138 MG/DL
GLUCOSE SERPL-MCNC: 138 MG/DL (ref 70–110)
GLUCOSE SERPL-MCNC: 144 MG/DL
GLUCOSE SERPL-MCNC: 149 MG/DL (ref 70–110)
HCO3 UR-SCNC: 21 MMOL/L (ref 24–28)
HCO3 UR-SCNC: 21.5 MMOL/L (ref 24–28)
HCT VFR BLD AUTO: 27.2 %
HCT VFR BLD AUTO: 29.5 %
HCT VFR BLD CALC: 19 %PCV (ref 36–54)
HCT VFR BLD CALC: 20 %PCV (ref 36–54)
HGB BLD-MCNC: 10.1 G/DL
HGB BLD-MCNC: 7 G/DL
HGB BLD-MCNC: 7 G/DL
HGB BLD-MCNC: 9.4 G/DL
INR PPP: 1
INR PPP: 1
LYMPHOCYTES # BLD AUTO: ABNORMAL K/UL
LYMPHOCYTES # BLD AUTO: ABNORMAL K/UL
LYMPHOCYTES NFR BLD: 1 %
LYMPHOCYTES NFR BLD: 4 %
MCH RBC QN AUTO: 30.7 PG
MCH RBC QN AUTO: 31.2 PG
MCHC RBC AUTO-ENTMCNC: 34.2 G/DL
MCHC RBC AUTO-ENTMCNC: 34.6 G/DL
MCV RBC AUTO: 90 FL
MCV RBC AUTO: 90 FL
MONOCYTES # BLD AUTO: ABNORMAL K/UL
MONOCYTES # BLD AUTO: ABNORMAL K/UL
MONOCYTES NFR BLD: 2 %
MONOCYTES NFR BLD: 6 %
NEUTROPHILS NFR BLD: 69 %
NEUTROPHILS NFR BLD: 77 %
NEUTS BAND NFR BLD MANUAL: 20 %
NEUTS BAND NFR BLD MANUAL: 21 %
PCO2 BLDA: 39.1 MMHG (ref 35–45)
PCO2 BLDA: 41.2 MMHG (ref 35–45)
PH SMN: 7.32 [PH] (ref 7.35–7.45)
PH SMN: 7.35 [PH] (ref 7.35–7.45)
PLATELET # BLD AUTO: 194 K/UL
PLATELET # BLD AUTO: 200 K/UL
PLATELET BLD QL SMEAR: ABNORMAL
PLATELET BLD QL SMEAR: ABNORMAL
PMV BLD AUTO: 10.5 FL
PMV BLD AUTO: 9.5 FL
PO2 BLDA: 461 MMHG (ref 80–100)
PO2 BLDA: 461 MMHG (ref 80–100)
POC BE: -4 MMOL/L
POC BE: -5 MMOL/L
POC IONIZED CALCIUM: 1.04 MMOL/L (ref 1.06–1.42)
POC IONIZED CALCIUM: 1.12 MMOL/L (ref 1.06–1.42)
POC SATURATED O2: 100 % (ref 95–100)
POC SATURATED O2: 100 % (ref 95–100)
POC TCO2: 22 MMOL/L (ref 23–27)
POC TCO2: 23 MMOL/L (ref 23–27)
POLYCHROMASIA BLD QL SMEAR: ABNORMAL
POTASSIUM BLD-SCNC: 3.3 MMOL/L (ref 3.5–5.1)
POTASSIUM BLD-SCNC: 3.3 MMOL/L (ref 3.5–5.1)
POTASSIUM SERPL-SCNC: 3.9 MMOL/L
POTASSIUM SERPL-SCNC: 3.9 MMOL/L
PROT SERPL-MCNC: 5.1 G/DL
PROTHROMBIN TIME: 11.2 SEC
PROTHROMBIN TIME: 11.3 SEC
RBC # BLD AUTO: 3.01 M/UL
RBC # BLD AUTO: 3.29 M/UL
SAMPLE: ABNORMAL
SAMPLE: ABNORMAL
SODIUM BLD-SCNC: 137 MMOL/L (ref 136–145)
SODIUM BLD-SCNC: 138 MMOL/L (ref 136–145)
SODIUM SERPL-SCNC: 137 MMOL/L
SODIUM SERPL-SCNC: 138 MMOL/L
TOXIC GRANULES BLD QL SMEAR: PRESENT
TRANS PLATPHERESIS VOL PATIENT: NORMAL ML
WBC # BLD AUTO: 26.99 K/UL
WBC # BLD AUTO: 31.82 K/UL

## 2017-09-25 PROCEDURE — 85610 PROTHROMBIN TIME: CPT

## 2017-09-25 PROCEDURE — 76937 US GUIDE VASCULAR ACCESS: CPT | Mod: 26,,, | Performed by: ANESTHESIOLOGY

## 2017-09-25 PROCEDURE — 25000003 PHARM REV CODE 250: Performed by: OBSTETRICS & GYNECOLOGY

## 2017-09-25 PROCEDURE — 85730 THROMBOPLASTIN TIME PARTIAL: CPT

## 2017-09-25 PROCEDURE — 86900 BLOOD TYPING SEROLOGIC ABO: CPT

## 2017-09-25 PROCEDURE — 0UTC0ZZ RESECTION OF CERVIX, OPEN APPROACH: ICD-10-PCS | Performed by: OBSTETRICS & GYNECOLOGY

## 2017-09-25 PROCEDURE — 36620 INSERTION CATHETER ARTERY: CPT | Mod: 59,,, | Performed by: ANESTHESIOLOGY

## 2017-09-25 PROCEDURE — 37000009 HC ANESTHESIA EA ADD 15 MINS: Performed by: OBSTETRICS & GYNECOLOGY

## 2017-09-25 PROCEDURE — P9038 RBC IRRADIATED: HCPCS

## 2017-09-25 PROCEDURE — 86920 COMPATIBILITY TEST SPIN: CPT

## 2017-09-25 PROCEDURE — 85007 BL SMEAR W/DIFF WBC COUNT: CPT | Mod: 91

## 2017-09-25 PROCEDURE — P9021 RED BLOOD CELLS UNIT: HCPCS

## 2017-09-25 PROCEDURE — 63600175 PHARM REV CODE 636 W HCPCS: Performed by: OBSTETRICS & GYNECOLOGY

## 2017-09-25 PROCEDURE — 88307 TISSUE EXAM BY PATHOLOGIST: CPT | Performed by: PATHOLOGY

## 2017-09-25 PROCEDURE — 96365 THER/PROPH/DIAG IV INF INIT: CPT

## 2017-09-25 PROCEDURE — 58150 TOTAL HYSTERECTOMY: CPT | Mod: ,,, | Performed by: OBSTETRICS & GYNECOLOGY

## 2017-09-25 PROCEDURE — 25000003 PHARM REV CODE 250: Performed by: ANESTHESIOLOGY

## 2017-09-25 PROCEDURE — 27000221 HC OXYGEN, UP TO 24 HOURS

## 2017-09-25 PROCEDURE — 63600175 PHARM REV CODE 636 W HCPCS: Performed by: STUDENT IN AN ORGANIZED HEALTH CARE EDUCATION/TRAINING PROGRAM

## 2017-09-25 PROCEDURE — 63600175 PHARM REV CODE 636 W HCPCS: Performed by: ANESTHESIOLOGY

## 2017-09-25 PROCEDURE — 85384 FIBRINOGEN ACTIVITY: CPT | Mod: 91

## 2017-09-25 PROCEDURE — 99285 EMERGENCY DEPT VISIT HI MDM: CPT | Mod: 25

## 2017-09-25 PROCEDURE — 58150 TOTAL HYSTERECTOMY: CPT | Mod: 82,,, | Performed by: OBSTETRICS & GYNECOLOGY

## 2017-09-25 PROCEDURE — 36620 INSERTION CATHETER ARTERY: CPT | Performed by: ANESTHESIOLOGY

## 2017-09-25 PROCEDURE — 96375 TX/PRO/DX INJ NEW DRUG ADDON: CPT

## 2017-09-25 PROCEDURE — 94799 UNLISTED PULMONARY SVC/PX: CPT

## 2017-09-25 PROCEDURE — 85384 FIBRINOGEN ACTIVITY: CPT

## 2017-09-25 PROCEDURE — 85610 PROTHROMBIN TIME: CPT | Mod: 91

## 2017-09-25 PROCEDURE — 25000003 PHARM REV CODE 250: Performed by: STUDENT IN AN ORGANIZED HEALTH CARE EDUCATION/TRAINING PROGRAM

## 2017-09-25 PROCEDURE — 63600175 PHARM REV CODE 636 W HCPCS: Performed by: PAIN MEDICINE

## 2017-09-25 PROCEDURE — 99283 EMERGENCY DEPT VISIT LOW MDM: CPT | Mod: ,,, | Performed by: OBSTETRICS & GYNECOLOGY

## 2017-09-25 PROCEDURE — 36000709 HC OR TIME LEV III EA ADD 15 MIN: Performed by: OBSTETRICS & GYNECOLOGY

## 2017-09-25 PROCEDURE — 85730 THROMBOPLASTIN TIME PARTIAL: CPT | Mod: 91

## 2017-09-25 PROCEDURE — 27800505 HC CATH, RADIAL ARTERY KIT: Performed by: ANESTHESIOLOGY

## 2017-09-25 PROCEDURE — 20000000 HC ICU ROOM

## 2017-09-25 PROCEDURE — 85027 COMPLETE CBC AUTOMATED: CPT | Mod: 91

## 2017-09-25 PROCEDURE — 27201423 OPTIME MED/SURG SUP & DEVICES STERILE SUPPLY: Performed by: OBSTETRICS & GYNECOLOGY

## 2017-09-25 PROCEDURE — 80053 COMPREHEN METABOLIC PANEL: CPT

## 2017-09-25 PROCEDURE — 71000039 HC RECOVERY, EACH ADD'L HOUR: Performed by: OBSTETRICS & GYNECOLOGY

## 2017-09-25 PROCEDURE — 0UT90ZZ RESECTION OF UTERUS, OPEN APPROACH: ICD-10-PCS | Performed by: OBSTETRICS & GYNECOLOGY

## 2017-09-25 PROCEDURE — 86901 BLOOD TYPING SEROLOGIC RH(D): CPT

## 2017-09-25 PROCEDURE — 37000008 HC ANESTHESIA 1ST 15 MINUTES: Performed by: OBSTETRICS & GYNECOLOGY

## 2017-09-25 PROCEDURE — 36000708 HC OR TIME LEV III 1ST 15 MIN: Performed by: OBSTETRICS & GYNECOLOGY

## 2017-09-25 PROCEDURE — 71000033 HC RECOVERY, INTIAL HOUR: Performed by: OBSTETRICS & GYNECOLOGY

## 2017-09-25 PROCEDURE — 88307 TISSUE EXAM BY PATHOLOGIST: CPT | Mod: 26,,, | Performed by: PATHOLOGY

## 2017-09-25 PROCEDURE — 80048 BASIC METABOLIC PNL TOTAL CA: CPT

## 2017-09-25 PROCEDURE — 36556 INSERT NON-TUNNEL CV CATH: CPT | Mod: 59,,, | Performed by: ANESTHESIOLOGY

## 2017-09-25 PROCEDURE — 94770 HC EXHALED C02 TEST: CPT

## 2017-09-25 PROCEDURE — D9220A PRA ANESTHESIA: Mod: ,,, | Performed by: ANESTHESIOLOGY

## 2017-09-25 RX ORDER — ONDANSETRON 2 MG/ML
4 INJECTION INTRAMUSCULAR; INTRAVENOUS DAILY PRN
Status: DISCONTINUED | OUTPATIENT
Start: 2017-09-25 | End: 2017-09-25 | Stop reason: HOSPADM

## 2017-09-25 RX ORDER — GLYCOPYRROLATE 0.2 MG/ML
INJECTION INTRAMUSCULAR; INTRAVENOUS
Status: DISCONTINUED | OUTPATIENT
Start: 2017-09-25 | End: 2017-09-25

## 2017-09-25 RX ORDER — MIDAZOLAM HYDROCHLORIDE 1 MG/ML
INJECTION INTRAMUSCULAR; INTRAVENOUS
Status: DISCONTINUED | OUTPATIENT
Start: 2017-09-25 | End: 2017-09-25

## 2017-09-25 RX ORDER — SODIUM CHLORIDE, SODIUM LACTATE, POTASSIUM CHLORIDE, CALCIUM CHLORIDE 600; 310; 30; 20 MG/100ML; MG/100ML; MG/100ML; MG/100ML
INJECTION, SOLUTION INTRAVENOUS CONTINUOUS
Status: DISCONTINUED | OUTPATIENT
Start: 2017-09-25 | End: 2017-09-26

## 2017-09-25 RX ORDER — KETOROLAC TROMETHAMINE 30 MG/ML
INJECTION, SOLUTION INTRAMUSCULAR; INTRAVENOUS
Status: DISCONTINUED | OUTPATIENT
Start: 2017-09-25 | End: 2017-09-25

## 2017-09-25 RX ORDER — DEXTROSE MONOHYDRATE, SODIUM CHLORIDE, AND POTASSIUM CHLORIDE 50; 1.49; 4.5 G/1000ML; G/1000ML; G/1000ML
INJECTION, SOLUTION INTRAVENOUS CONTINUOUS
Status: DISCONTINUED | OUTPATIENT
Start: 2017-09-25 | End: 2017-09-26

## 2017-09-25 RX ORDER — HYDROMORPHONE HCL IN 0.9% NACL 6 MG/30 ML
PATIENT CONTROLLED ANALGESIA SYRINGE INTRAVENOUS CONTINUOUS
Status: DISCONTINUED | OUTPATIENT
Start: 2017-09-25 | End: 2017-09-27

## 2017-09-25 RX ORDER — LIDOCAINE HCL/PF 100 MG/5ML
SYRINGE (ML) INTRAVENOUS
Status: DISCONTINUED | OUTPATIENT
Start: 2017-09-25 | End: 2017-09-25

## 2017-09-25 RX ORDER — PROPOFOL 10 MG/ML
VIAL (ML) INTRAVENOUS
Status: DISCONTINUED | OUTPATIENT
Start: 2017-09-25 | End: 2017-09-25

## 2017-09-25 RX ORDER — IBUPROFEN 600 MG/1
600 TABLET ORAL EVERY 6 HOURS
Status: CANCELLED | OUTPATIENT
Start: 2017-09-25

## 2017-09-25 RX ORDER — HYDROMORPHONE HYDROCHLORIDE 1 MG/ML
0.5 INJECTION, SOLUTION INTRAMUSCULAR; INTRAVENOUS; SUBCUTANEOUS ONCE
Status: COMPLETED | OUTPATIENT
Start: 2017-09-25 | End: 2017-09-25

## 2017-09-25 RX ORDER — SUCCINYLCHOLINE CHLORIDE 20 MG/ML
INJECTION INTRAMUSCULAR; INTRAVENOUS
Status: DISCONTINUED | OUTPATIENT
Start: 2017-09-25 | End: 2017-09-25

## 2017-09-25 RX ORDER — ACETAMINOPHEN 10 MG/ML
INJECTION, SOLUTION INTRAVENOUS
Status: DISCONTINUED | OUTPATIENT
Start: 2017-09-25 | End: 2017-09-25

## 2017-09-25 RX ORDER — ONDANSETRON 8 MG/1
8 TABLET, ORALLY DISINTEGRATING ORAL EVERY 8 HOURS PRN
Status: DISCONTINUED | OUTPATIENT
Start: 2017-09-25 | End: 2017-09-30 | Stop reason: HOSPADM

## 2017-09-25 RX ORDER — HYDROMORPHONE HYDROCHLORIDE 2 MG/ML
0.2 INJECTION, SOLUTION INTRAMUSCULAR; INTRAVENOUS; SUBCUTANEOUS EVERY 5 MIN PRN
Status: DISCONTINUED | OUTPATIENT
Start: 2017-09-25 | End: 2017-09-25 | Stop reason: HOSPADM

## 2017-09-25 RX ORDER — NALOXONE HCL 0.4 MG/ML
0.02 VIAL (ML) INJECTION
Status: DISCONTINUED | OUTPATIENT
Start: 2017-09-25 | End: 2017-09-27

## 2017-09-25 RX ORDER — SODIUM CHLORIDE 9 MG/ML
INJECTION, SOLUTION INTRAVENOUS CONTINUOUS PRN
Status: DISCONTINUED | OUTPATIENT
Start: 2017-09-25 | End: 2017-09-25

## 2017-09-25 RX ORDER — ACETAMINOPHEN 10 MG/ML
1000 INJECTION, SOLUTION INTRAVENOUS EVERY 8 HOURS
Status: COMPLETED | OUTPATIENT
Start: 2017-09-25 | End: 2017-09-26

## 2017-09-25 RX ORDER — ROCURONIUM BROMIDE 10 MG/ML
INJECTION, SOLUTION INTRAVENOUS
Status: DISCONTINUED | OUTPATIENT
Start: 2017-09-25 | End: 2017-09-25

## 2017-09-25 RX ORDER — PHENYLEPHRINE HYDROCHLORIDE 10 MG/ML
INJECTION INTRAVENOUS
Status: DISCONTINUED | OUTPATIENT
Start: 2017-09-25 | End: 2017-09-25

## 2017-09-25 RX ORDER — ONDANSETRON 2 MG/ML
INJECTION INTRAMUSCULAR; INTRAVENOUS
Status: DISCONTINUED | OUTPATIENT
Start: 2017-09-25 | End: 2017-09-25

## 2017-09-25 RX ORDER — NEOSTIGMINE METHYLSULFATE 1 MG/ML
INJECTION, SOLUTION INTRAVENOUS
Status: DISCONTINUED | OUTPATIENT
Start: 2017-09-25 | End: 2017-09-25

## 2017-09-25 RX ORDER — SODIUM CHLORIDE 0.9 % (FLUSH) 0.9 %
3 SYRINGE (ML) INJECTION
Status: DISCONTINUED | OUTPATIENT
Start: 2017-09-25 | End: 2017-09-30 | Stop reason: HOSPADM

## 2017-09-25 RX ORDER — FENTANYL CITRATE 50 UG/ML
INJECTION, SOLUTION INTRAMUSCULAR; INTRAVENOUS
Status: DISCONTINUED | OUTPATIENT
Start: 2017-09-25 | End: 2017-09-25

## 2017-09-25 RX ORDER — CEFAZOLIN SODIUM 2 G/50ML
2 SOLUTION INTRAVENOUS
Status: COMPLETED | OUTPATIENT
Start: 2017-09-25 | End: 2017-09-25

## 2017-09-25 RX ADMIN — HYDROMORPHONE HYDROCHLORIDE 0.2 MG: 2 INJECTION INTRAMUSCULAR; INTRAVENOUS; SUBCUTANEOUS at 08:09

## 2017-09-25 RX ADMIN — Medication: at 09:09

## 2017-09-25 RX ADMIN — PHENYLEPHRINE HYDROCHLORIDE 200 MCG: 10 INJECTION INTRAVENOUS at 06:09

## 2017-09-25 RX ADMIN — LIDOCAINE HYDROCHLORIDE 100 MG: 20 INJECTION, SOLUTION INTRAVENOUS at 05:09

## 2017-09-25 RX ADMIN — SODIUM CHLORIDE, SODIUM LACTATE, POTASSIUM CHLORIDE, AND CALCIUM CHLORIDE 1000 ML: .6; .31; .03; .02 INJECTION, SOLUTION INTRAVENOUS at 06:09

## 2017-09-25 RX ADMIN — SUCCINYLCHOLINE CHLORIDE 120 MG: 20 INJECTION, SOLUTION INTRAMUSCULAR; INTRAVENOUS at 05:09

## 2017-09-25 RX ADMIN — PROPOFOL 150 MG: 10 INJECTION, EMULSION INTRAVENOUS at 05:09

## 2017-09-25 RX ADMIN — FENTANYL CITRATE 50 MCG: 50 INJECTION, SOLUTION INTRAMUSCULAR; INTRAVENOUS at 08:09

## 2017-09-25 RX ADMIN — SODIUM CHLORIDE, SODIUM LACTATE, POTASSIUM CHLORIDE, AND CALCIUM CHLORIDE: .6; .31; .03; .02 INJECTION, SOLUTION INTRAVENOUS at 05:09

## 2017-09-25 RX ADMIN — ROCURONIUM BROMIDE 40 MG: 10 INJECTION INTRAVENOUS at 06:09

## 2017-09-25 RX ADMIN — GLYCOPYRROLATE 0.6 MG: 0.2 INJECTION, SOLUTION INTRAMUSCULAR; INTRAVENOUS at 07:09

## 2017-09-25 RX ADMIN — PHENYLEPHRINE HYDROCHLORIDE 100 MCG: 10 INJECTION INTRAVENOUS at 06:09

## 2017-09-25 RX ADMIN — ROCURONIUM BROMIDE 10 MG: 10 INJECTION INTRAVENOUS at 07:09

## 2017-09-25 RX ADMIN — SODIUM CHLORIDE, SODIUM LACTATE, POTASSIUM CHLORIDE, AND CALCIUM CHLORIDE 200 ML: .6; .31; .03; .02 INJECTION, SOLUTION INTRAVENOUS at 07:09

## 2017-09-25 RX ADMIN — ACETAMINOPHEN 1000 MG: 10 INJECTION, SOLUTION INTRAVENOUS at 07:09

## 2017-09-25 RX ADMIN — ONDANSETRON 4 MG: 2 INJECTION INTRAMUSCULAR; INTRAVENOUS at 07:09

## 2017-09-25 RX ADMIN — ONDANSETRON 8 MG: 8 TABLET, ORALLY DISINTEGRATING ORAL at 03:09

## 2017-09-25 RX ADMIN — FENTANYL CITRATE 100 MCG: 50 INJECTION, SOLUTION INTRAMUSCULAR; INTRAVENOUS at 07:09

## 2017-09-25 RX ADMIN — PHENYLEPHRINE HYDROCHLORIDE 200 MCG: 10 INJECTION INTRAVENOUS at 07:09

## 2017-09-25 RX ADMIN — DEXTROSE MONOHYDRATE, SODIUM CHLORIDE, AND POTASSIUM CHLORIDE: 50; 4.5; 1.49 INJECTION, SOLUTION INTRAVENOUS at 09:09

## 2017-09-25 RX ADMIN — KETOROLAC TROMETHAMINE 30 MG: 30 INJECTION, SOLUTION INTRAMUSCULAR; INTRAVENOUS at 08:09

## 2017-09-25 RX ADMIN — SODIUM CHLORIDE, SODIUM LACTATE, POTASSIUM CHLORIDE, AND CALCIUM CHLORIDE 1000 ML: .6; .31; .03; .02 INJECTION, SOLUTION INTRAVENOUS at 05:09

## 2017-09-25 RX ADMIN — HYDROMORPHONE HYDROCHLORIDE 0.2 MG: 2 INJECTION INTRAMUSCULAR; INTRAVENOUS; SUBCUTANEOUS at 09:09

## 2017-09-25 RX ADMIN — FENTANYL CITRATE 100 MCG: 50 INJECTION, SOLUTION INTRAMUSCULAR; INTRAVENOUS at 06:09

## 2017-09-25 RX ADMIN — SODIUM CHLORIDE: 0.9 INJECTION, SOLUTION INTRAVENOUS at 06:09

## 2017-09-25 RX ADMIN — DEXTROSE 2 G: 50 INJECTION, SOLUTION INTRAVENOUS at 06:09

## 2017-09-25 RX ADMIN — MIDAZOLAM HYDROCHLORIDE 2 MG: 1 INJECTION, SOLUTION INTRAMUSCULAR; INTRAVENOUS at 05:09

## 2017-09-25 RX ADMIN — PROMETHAZINE HYDROCHLORIDE 6.25 MG: 25 INJECTION INTRAMUSCULAR; INTRAVENOUS at 07:09

## 2017-09-25 RX ADMIN — Medication: at 06:09

## 2017-09-25 RX ADMIN — CEFAZOLIN SODIUM 2 G: 2 SOLUTION INTRAVENOUS at 05:09

## 2017-09-25 RX ADMIN — Medication: at 02:09

## 2017-09-25 RX ADMIN — SODIUM CHLORIDE, SODIUM LACTATE, POTASSIUM CHLORIDE, AND CALCIUM CHLORIDE 700 ML: .6; .31; .03; .02 INJECTION, SOLUTION INTRAVENOUS at 06:09

## 2017-09-25 RX ADMIN — ACETAMINOPHEN 1000 MG: 10 INJECTION, SOLUTION INTRAVENOUS at 10:09

## 2017-09-25 RX ADMIN — Medication: at 11:09

## 2017-09-25 RX ADMIN — DEXTROSE MONOHYDRATE, SODIUM CHLORIDE, AND POTASSIUM CHLORIDE: 50; 4.5; 1.49 INJECTION, SOLUTION INTRAVENOUS at 06:09

## 2017-09-25 RX ADMIN — HYDROMORPHONE HYDROCHLORIDE 0.5 MG: 1 INJECTION, SOLUTION INTRAMUSCULAR; INTRAVENOUS; SUBCUTANEOUS at 05:09

## 2017-09-25 RX ADMIN — SODIUM CHLORIDE: 0.9 INJECTION, SOLUTION INTRAVENOUS at 07:09

## 2017-09-25 RX ADMIN — ACETAMINOPHEN 1000 MG: 10 INJECTION, SOLUTION INTRAVENOUS at 02:09

## 2017-09-25 RX ADMIN — NEOSTIGMINE METHYLSULFATE 5 MG: 1 INJECTION INTRAVENOUS at 07:09

## 2017-09-25 NOTE — ANESTHESIA PROCEDURE NOTES
Central Line    Diagnosis: placenta accreta in site  Patient location during procedure: done in OR  Procedure start time: 9/25/2017 6:10 AM  Timeout: 9/25/2017 6:05 AM  Procedure end time: 9/25/2017 6:20 AM  Staffing  Anesthesiologist: NIKKI BEAL  Resident/CRNA: GISELE TERESA  Other anesthesia staff: SHRADDHA ZELAYA  Performed: resident/CRNA   Anesthesiologist was present at the time of the procedure.  Preanesthetic Checklist  Completed: patient identified, site marked, surgical consent, pre-op evaluation, timeout performed, IV checked, risks and benefits discussed, monitors and equipment checked and anesthesia consent given  Indication  Indication: vascular access     Anesthesia   general anesthesia    Central Line  Skin Prep: skin prepped with ChloraPrep, skin prep agent completely dried prior to procedure  maximum sterile barriers used during central venous catheter insertion  hand hygiene performed prior to central venous catheter insertion  Location: right internal jugular,   Catheter type: triple lumen  Catheter Size: 12 Fr  Ultrasound: vascular probe with ultrasound  Vessel Caliber: medium, patent, compressibility normal  Needle advanced into vessel with real time Ultrasound guidance.  Guidewire confirmed in vessel.  Sterile sheath used.  Image recorded and saved.   Manometry: Venous cannualation confirmed by visual estimation of blood vessel pressure using manometry.  Insertion Attempts: 1   Securement:line sutured, chlorhexidine patch, sterile dressing applied and blood return through all ports     Post-Procedure  Adverse Events:none

## 2017-09-25 NOTE — PLAN OF CARE
Problem: Patient Care Overview  Goal: Plan of Care Review  Outcome: Ongoing (interventions implemented as appropriate)  ETCO2 is at bedside and in-use, pt is on 3LNC. Pt achieved 1000 on IS. No changes made. Will continue to monitor.

## 2017-09-25 NOTE — TELEPHONE ENCOUNTER
"MD called patient to further assess current symptoms of abdominal pain and recent increased temp. She has h/o placenta accreta, now POD#12 s/p classical C/S and uterine artery emobolization, placenta remains in situ.     Unable to talk to patient due to pain, spoke with significant other who reports she has had worsening of her pain each night, but that this is the worst pain she has experienced since surgery. States she is double over and PO medications at home are not helping. Denies bleeding. Denies current fever. Past few nights had increased temps up to 100.3, but never had a true fever of 100.4 or higher. Otherwise tolerating PO, normal bladder and bowel function.    Patient lives in [place], [place]. Instructed to come to [facility] for further evaluation.    Have since received word from significant other that patient started "bleeding heavily" and ambulance was called. Patient is being taken to nearest [facility]. On call staff Dr. Casillsa and GYN onc staff on call Dr. Tafoya notified of patient situation and likely transport.    Aspen Vaughn MD  OBGYN - PGY 3     "

## 2017-09-25 NOTE — H&P
[facility]  Obstetrics & Gynecology  History & Physical    Patient Name: Louisa Morrow  MRN: [id number]  Admission Date: 2017  Primary Care Provider: Waylon Chery MD    Subjective:     Chief Complaint/Reason for Admission: vaginal bleeding, severe cramping    History of Present Illness:  33 yo  with h/o placenta accreta now POD#12 s/p classical  and UAE, placenta in situ, who presents as a transport from [facility] for acute onset abdominal cramping since 8 pm and heavy VB since 1030 pm. Describes pain as intense cramping that comes in waves every 3-4 minutes like labor contractions. Has saturated 3-4 pad/towels since bleeding onset. Denies fever, chills. +N/V x1 with intense pain. Otherwise tolerating PO, normal bladder and bowel function.         Obstetric History       T2      L3     SAB0   TAB0   Ectopic0   Multiple0   Live Births3       # Outcome Date GA Lbr Nixon/2nd Weight Sex Delivery Anes PTL Lv   4  17 34w0d  2.25 kg (4 lb 15.4 oz) M CS-Classical Spinal, EPI N FREDY      Name: MARIA LUISA MORROW      Apgar1:  9                Apgar5: 9   3 Term 13 39w0d  3.525 kg (7 lb 12.3 oz) F CS-LTranv Spinal N FREDY      Name: LAYABABY GIRL       Apgar1:  9               Apgar5: 10   2 Term    3.345 kg (7 lb 6 oz) M CS-LTranv   FREDY   1 SAB                 Past Medical History:   Diagnosis Date    BV (bacterial vaginosis)     Migraines      Past Surgical History:   Procedure Laterality Date     SECTION      L Arm surgery      Primary Cesarian           (Not in a hospital admission)    Review of patient's allergies indicates:  No Known Allergies     Family History     Problem Relation (Age of Onset)    Cancer Paternal Grandmother    Lung cancer Maternal Grandfather        Social History Main Topics    Smoking status: Former Smoker     Types: Cigarettes     Quit date: 2016    Smokeless tobacco: Never  "Used    Alcohol use No    Drug use: No      Comment: +THC on 3/13/2017, states "has smoked since then"    Sexual activity: Yes     Partners: Male     Review of Systems   Constitutional: Negative for chills and fever.   Eyes: Negative for visual disturbance.   Respiratory: Negative for shortness of breath.    Cardiovascular: Negative for chest pain and palpitations.   Gastrointestinal: Positive for abdominal pain, nausea and vomiting.   Genitourinary: Positive for vaginal bleeding. Negative for vaginal discharge.   Musculoskeletal: Positive for back pain.   Neurological: Negative for seizures, syncope and headaches.   Hematological: Does not bruise/bleed easily.   Psychiatric/Behavioral: The patient is not nervous/anxious.       Objective:     Vital Signs (Most Recent):  Temp: 99.9 °F (37.7 °C) (09/25/17 0422)  Pulse: (!) 115 (09/25/17 0422)  Resp: 18 (09/25/17 0422)  BP: (!) 108/57 (09/25/17 0422)  SpO2: (!) 91 % (09/25/17 0421) Vital Signs (24h Range):  Temp:  [99.9 °F (37.7 °C)] 99.9 °F (37.7 °C)  Pulse:  [115-126] 115  Resp:  [18] 18  SpO2:  [91 %-97 %] 91 %  BP: (108-119)/(57-72) 108/57     BMI 30    No LMP recorded.    Physical Exam:   Constitutional: She is oriented to person, place, and time. She appears well-developed and well-nourished. She appears distressed (appears in significant pain).   Appears pale       Cardiovascular: Normal rate.    Tachycardic    Pulmonary/Chest: Effort normal. No respiratory distress.        Abdominal: Soft. Bowel sounds are normal. She exhibits abdominal incision (vertical midline, staples in place, c/d/i, mild surrounding erythema, no induration or flutuance noted, no drainage). She exhibits no distension. There is tenderness (mild). There is no rebound and no guarding.     Genitourinary:   Genitourinary Comments: Moderate amount of blood clots cleared from vault. Cervix unale to fully visualize as os significantly dilated, at least ~6 cm. Placenta bulging through os. No " active bleeding coming from uterus.            Musculoskeletal: Moves all extremeties. She exhibits no edema.       Neurological: She is alert and oriented to person, place, and time.    Skin: Skin is warm and dry.    Psychiatric: She has a normal mood and affect. Her behavior is normal.       Laboratory:  CBC at outside ED 11/34  Admit labs pending      Assessment/Plan:     * Placenta accreta    - patient in severe pain, actively trying to deliver placenta on exam  - discussed with GYN ONC on call, Dr. Tafoya, as well as patient's ONC Dr. Small who agree that we need to move forward with abdominal hysterectomy now. Patient agrees to proceed as planned.  - consents for LINSEY/BS and blood discussed, signed and to chart  - anesthesia, charge RN, house supervisor/OR notified  - case request placed  - ancef 2g OCTOR  - TEDs/SCDs for DVT ppx  - CBC, T&S, PT/INR, PTT, fibrinogen, CMP ordered STAT  - hold 6u pRBC, 2u FFP, 1u plt, have blood ready in OR                Aspen Vaughn MD  Obstetrics & Gynecology  [facility]

## 2017-09-25 NOTE — PLAN OF CARE
09/25/17 1051   ED Admissions Case Approval   ED Admissions Case Approval CM Approved  (IP )

## 2017-09-25 NOTE — TRANSFER OF CARE
Anesthesia Transfer of Care Note    Patient: Louisa Morrow    Procedure(s) Performed: Procedure(s) (LRB):  HYSTERECTOMY-ABDOMINAL-TOTAL (LINSEY) (N/A)    Patient location: PACU    Anesthesia Type: general    Transport from OR: Transported from OR on 6-10 L/min O2 by face mask with adequate spontaneous ventilation    Post pain: adequate analgesia    Post assessment: no apparent anesthetic complications    Post vital signs: stable    Level of consciousness: awake    Nausea/Vomiting: no nausea/vomiting    Complications: none          Last vitals:   Visit Vitals  /71   Pulse 87   Temp 37 °C (98.6 °F) (Oral)   Resp 18   SpO2 97%

## 2017-09-25 NOTE — ANESTHESIA PROCEDURE NOTES
Arterial    Diagnosis: placenta accreta in situe    Patient location during procedure: pre-op  Procedure start time: 9/25/2017 5:23 AM  Timeout: 9/25/2017 5:20 AM  Procedure end time: 9/25/2017 5:28 AM  Staffing  Anesthesiologist: SHRADDHA ZELAYA  Resident/CRNA: GISELE TERESA  Performed: resident/CRNA   Anesthesiologist was present at the time of the procedure.  Preanesthetic Checklist  Completed: patient identified, site marked, surgical consent, pre-op evaluation, timeout performed, IV checked, risks and benefits discussed, monitors and equipment checked and anesthesia consent givenArterial  Skin Prep: chlorhexidine gluconate  Local Infiltration: lidocaine  Orientation: right  Location: radial  Catheter Size: 20 G  Catheter placement by Ultrasound guidance. Heme positive aspiration all ports.  Vessel Caliber: medium, compressibility normal  Needle advanced into vessel with real time Ultrasound guidance.  Guidewire confirmed in vessel.  Sterile sheath used.  Image recorded and saved.Insertion Attempts: 2  Assessment  Dressing: secured with tape and tegaderm  Patient: Tolerated well

## 2017-09-25 NOTE — HPI
33 yo  with h/o placenta accreta now POD#12 s/p classical  and UAE, placenta in situ, who presents as a transport from [facility] for acute onset abdominal cramping since 8 pm and heavy VB since 1030 pm. Describes pain as intense cramping that comes in waves every 3-4 minutes like labor contractions. Has saturated 3-4 pad/towels since bleeding onset. Denies fever, chills. +N/V x1 with intense pain. Otherwise tolerating PO, normal bladder and bowel function.

## 2017-09-25 NOTE — ED PROVIDER NOTES
Encounter Date: 2017       History   33 yo  with h/o placenta accreta now POD#12 s/p classical  and UAE, placenta in situ, who presents as a transport from [facility] for acute onset abdominal cramping since 8 pm and heavy VB since 1030 pm. Describes pain as intense cramping that comes in waves every 3-4 minutes like labor contractions. Has saturated 3-4 pad/towels since bleeding onset. Denies fever, chills. +N/V x1 with intense pain. Otherwise tolerating PO, normal bladder and bowel function.      Chief Complaint   Patient presents with    Vaginal Bleeding     The history is provided by the patient.     Review of patient's allergies indicates:  No Known Allergies  Past Medical History:   Diagnosis Date    BV (bacterial vaginosis)     Migraines      Past Surgical History:   Procedure Laterality Date     SECTION      L Arm surgery      Primary Cesarian  2010     Family History   Problem Relation Age of Onset    Lung cancer Maternal Grandfather     Cancer Paternal Grandmother      Social History   Substance Use Topics    Smoking status: Former Smoker     Types: Cigarettes     Quit date: 2016    Smokeless tobacco: Never Used    Alcohol use No     Review of Systems   Constitutional: Negative for activity change, appetite change, chills and fever.   Eyes: Negative for visual disturbance.   Respiratory: Negative for shortness of breath.    Cardiovascular: Negative for chest pain.   Gastrointestinal: Positive for abdominal pain and nausea. Negative for vomiting.   Genitourinary: Positive for vaginal bleeding.   Musculoskeletal: Positive for back pain.   Skin: Negative for rash.   Neurological: Negative for dizziness, weakness and headaches.   Hematological: Does not bruise/bleed easily.       Physical Exam     Initial Vitals   BP Pulse Resp Temp SpO2   17 0405 17 0405 17 0405 17 0422 17 0406   119/72 (!) 118 18 99.9 °F (37.7 °C) 97 %       MAP       09/25/17 0405       87.67         Physical Exam    Vitals reviewed.  Constitutional: She appears well-developed and well-nourished. She is not diaphoretic. She appears distressed.   HENT:   Head: Normocephalic and atraumatic.   Cardiovascular: Normal rate and regular rhythm.   Pulmonary/Chest: Breath sounds normal. No respiratory distress.   Abdominal: She exhibits distension. There is tenderness. There is no rebound and no guarding.   abdominal incision (vertical midline, staples in place, c/d/i, mild surrounding erythema, no induration or flutuance noted, no drainage)   Genitourinary:   Genitourinary Comments: Moderate amount of blood clots cleared from vault. Cervix unale to fully visualize as os significantly dilated, at least ~6 cm. Placenta bulging through os. No active bleeding coming from uterus.      Neurological: She is alert and oriented to person, place, and time.   Skin: Skin is warm.   Psychiatric: She has a normal mood and affect. Thought content normal.       ED Course   Procedures  Labs Reviewed   POCT GLUCOSE MONITORING CONTINUOUS   PREPARE PLATELETS (DOSE) SOFT   PREPARE FRESH FROZEN PLASMA SOFT   PREPARE RBC SOFT             Medical Decision Making:   Initial Assessment:   Placenta In Situ with significant bleeding.  To OR for LINSEY/BS. GYN ONC Aware.  Please See H&P for detailed plan            Attending Attestation:   Physician Attestation Statement for Resident:  As the supervising MD   Physician Attestation Statement: I have personally seen and examined this patient.   I agree with the above history. -:   As the supervising MD I agree with the above PE.    As the supervising MD I agree with the above treatment, course, plan, and disposition.   -: Patient evaluated and found to be stable, agree with resident's assessment and plan.  I was personally present during the critical portions of the procedure(s) performed by the resident and was immediately available in the ED to provide  services and assistance as needed during the entire procedure.  I have reviewed the following: old records at this facility.                    ED Course      Clinical Impression:   The encounter diagnosis was Placenta accreta, third trimester.          Breana Lawrence M.D. On behalf of Dr. Vaughn  PGY-3 ObGyn  [phone]       Gracie Milan MD  09/27/17 [id number]

## 2017-09-25 NOTE — PLAN OF CARE
09/25/17 1310   Discharge Assessment   Assessment Type Discharge Planning Assessment   Confirmed/corrected address and phone number on facesheet? No   Assessment information obtained from? Medical Record;Caregiver   Communicated expected length of stay with patient/caregiver no   Prior to hospitilization cognitive status: Unable to Assess   Current cognitive status: Coma/Sedated/Intubated   Able to Return to Prior Arrangements unable to determine at this time (comments)   Is patient able to care for self after discharge? Unable to determine at this time (comments)   Readmission Within The Last 30 Days unable to assess   Patient currently being followed by outpatient case management? Unable to determine (comments)   Equipment Currently Used at Home none   Transportation Available family or friend will provide;car   Patient/Family In Agreement With Plan yes

## 2017-09-25 NOTE — TELEPHONE ENCOUNTER
"Delivery [facility]   Pt has retained placenta. Removing vasquez enid. Shakes, cramping pain q night since home.      Reason for Disposition   [1] Constant abdominal pain AND [2] present > 2 hours    Answer Assessment - Initial Assessment Questions  1. LOCATION: "Where does it hurt?"      Lower pelvis lower stomach to back.   2. RADIATION: "Does the pain shoot anywhere else?" (e.g., chest, back)     Back, lasts 3-5min goes away 7-10 min and then comes right back   3. ONSET: "When did the pain begin?" (Minutes, hours or days ago)        4. DELIVERY DATE: "When was your delivery date?" "Vaginal delivery or ?"      5. ONSET: "Gradual or sudden onset?"     Sudden   6. PATTERN "Does the pain come and go, or has it been constant since it started?"  (Note: most serious pain is constant and it progresses)      As above   7. SEVERITY: "How bad is the pain?" "What does it keep you from doing?"      - MILD -  doesn't interfere with normal activities, abdomen soft and not tender to touch      - MODERATE - interferes with normal activities or awakens from sleep, tender to touch      - SEVERE - patient doesn't want to move, doubled over, abdomen rigid (R/O peritonitis)     Severe   8. FEVER: "Do you have a fever?" If so, ask: "What is your temperature, how was it measured, and when did it start?"     T99 up to 100.3  two days ago   9. VAGINAL BLEEDING - DISCHARGE: "Describe any vaginal bleeding or discharge you are having." (e.g., amount; color; odor)     3rd baby. Wearing pad- pink in urine.   10. OTHER SYMPTOMS: "Has there been any vomiting, diarrhea, constipation, or urine problems?"    Nausea. +pain with urinating    Protocols used: ST POSTPARTUM - ABDOMINAL PAIN-A-AH  eating reg diet. Spoke with Dr diaz- will call pt back. Family notified. Mr Delacruz that he is getting dressed and taking pt to [facility] now as pt will not make it thru night due to pain. Pt coming from [place]. MD notified. MD states that she " talked to the family and agreed with pt coming into ED. call back with questions.

## 2017-09-25 NOTE — ANESTHESIA PREPROCEDURE EVALUATION
2017  Louisa Morrow is a 32 y.o., female   with h/o placenta accreta now POD#12 s/p classical  and UAE, placenta in situ, who presents as a transport from [facility] for acute onset abdominal cramping since 8 pm and heavy VB since 1030 pm. Describes pain as intense cramping that comes in waves every 3-4 minutes like labor contractions. Has saturated 3-4 pad/towels since bleeding onset. Denies fever, chills. +N/V x1 with intense pain. Otherwise tolerating PO, normal bladder and bowel function.     Pre-operative evaluation for Procedure(s) (LRB):  HYSTERECTOMY-ABDOMINAL-TOTAL (LINSEY) (N/A)    OB History      Para Term  AB Living    4 3 2 1 1 3    SAB TAB Ectopic Multiple Live Births    1     0 3            Patient Active Problem List   Diagnosis    Elective surgery    Placenta previa antepartum in second trimester    Placenta accreta in third trimester    Placenta accreta     delivery delivered       Review of patient's allergies indicates:  No Known Allergies     No current facility-administered medications on file prior to encounter.      Current Outpatient Prescriptions on File Prior to Encounter   Medication Sig Dispense Refill    hydrocodone-acetaminophen 5-325mg (NORCO) 5-325 mg per tablet Take 1 tablet by mouth every 4 (four) hours as needed. 30 tablet 0    ibuprofen (ADVIL,MOTRIN) 600 MG tablet Take 1 tablet (600 mg total) by mouth every 6 (six) hours. 30 tablet 2    PNV,CALCIUM 72/IRON/FOLIC ACID (PRENATAL VITAMIN) Tab Take 1 tablet by mouth once daily.      promethazine (PHENERGAN) 25 MG tablet Take 25 mg by mouth every 4 (four) hours.         Past Surgical History:   Procedure Laterality Date     SECTION      L Arm surgery  2007    Primary Cesarian  2010       Social History     Social History    Marital status: Single      "Spouse name: N/A    Number of children: N/A    Years of education: N/A     Occupational History    Not on file.     Social History Main Topics    Smoking status: Former Smoker     Types: Cigarettes     Quit date: 07/2016    Smokeless tobacco: Never Used    Alcohol use No    Drug use: No      Comment: +THC on 3/13/2017, states "has smoked since then"    Sexual activity: Yes     Partners: Male     Other Topics Concern    Not on file     Social History Narrative    No narrative on file         Vital Signs Range (Last 24H):  Temp:  [37.7 °C (99.9 °F)]   Pulse:  [107-126]   Resp:  [18]   BP: (108-119)/(57-72)   SpO2:  [91 %-97 %]       CBC:   Recent Labs      09/25/17   0505   WBC  26.99*   RBC  3.01*   HGB  9.4*   HCT  27.2*   PLT  200   MCV  90   MCH  31.2*   MCHC  34.6       CMP:   Recent Labs      09/25/17   0505   NA  137   K  3.9   CL  112*   CO2  13*   BUN  8   CREATININE  0.6   GLU  138*   CALCIUM  7.8*   ALBUMIN  2.1*   PROT  5.1*   ALKPHOS  313*   ALT  13   AST  20   BILITOT  0.6       INR  Recent Labs      09/25/17   0443   INR  1.0   APTT  30.7       Anesthesia Evaluation    I have reviewed the Patient Summary Reports.    I have reviewed the Nursing Notes.   I have reviewed the Medications.     Review of Systems  Anesthesia Hx:  No problems with previous Anesthesia  History of prior surgery of interest to airway management or planning: Denies Family Hx of Anesthesia complications.   Denies Personal Hx of Anesthesia complications.   Social:  Former Smoker, No Alcohol Use    Hematology/Oncology:     Oncology Normal    -- Anemia: Hematology Comments: Vaginal bleeding    EENT/Dental:EENT/Dental Normal   Cardiovascular:  Cardiovascular Normal     Pulmonary:  Pulmonary Normal    Renal/:  Renal/ Normal     Hepatic/GI:  Hepatic/GI Normal    Musculoskeletal:  Musculoskeletal Normal    Neurological:  Neurology Normal    Endocrine:  Endocrine Normal    Dermatological:  Skin Normal    Psych:  Psychiatric " Normal           Physical Exam  General:  Well nourished    Airway/Jaw/Neck:  Airway Findings: Mouth Opening: Normal Tongue: Normal  General Airway Assessment: Adult  Mallampati: III  Improves to II with phonation.  TM Distance: Normal, at least 6 cm      Dental:  Dental Findings: In tact   Chest/Lungs:  Chest/Lungs Findings: Clear to auscultation, Normal Respiratory Rate     Heart/Vascular:  Heart Findings: Rate: Tachycardia  Rhythm: Regular Rhythm  Sounds: Normal        Mental Status:  Mental Status Findings:  Cooperative, Alert and Oriented, Anxious         Anesthesia Plan  Type of Anesthesia, risks & benefits discussed:  Anesthesia Type:  epidural  Patient's Preference:   Intra-op Monitoring Plan:   Intra-op Monitoring Plan Comments:   Post Op Pain Control Plan:   Post Op Pain Control Plan Comments:   Induction:   IV  Beta Blocker:  Patient is not currently on a Beta-Blocker (No further documentation required).       Informed Consent: Patient understands risks and agrees with Anesthesia plan.  Questions answered. Anesthesia consent signed with patient.  ASA Score: 4     Day of Surgery Review of History & Physical:    H&P update referred to the surgeon.     Anesthesia Plan Notes: Plan for Arterial line, large PIV access and central line        Ready For Surgery From Anesthesia Perspective.

## 2017-09-25 NOTE — ASSESSMENT & PLAN NOTE
- patient in severe pain, actively trying to deliver placenta on exam  - discussed with GYN ONC on call, Dr. Tafoya, as well as patient's ONC Dr. Small who agree that we need to move forward with abdominal hysterectomy now. Patient agrees to proceed as planned.  - consents for LINSEY/BS and blood discussed, signed and to chart  - anesthesia, charge RN, house supervisor/OR notified  - case request placed  - ancef 2g OCTOR  - TEDs/SCDs for DVT ppx  - CBC, T&S, PT/INR, PTT, fibrinogen, CMP ordered STAT  - hold 6u pRBC, 2u FFP, 1u plt, have blood ready in OR

## 2017-09-25 NOTE — PROGRESS NOTES
To bedside for afternoon assessment:    Pt lying flat in bed. Complains of incisional pain/burning that she rates 8/10. She also reports nausea. She is tolerating small sips of water without emesis.     Temp:  [98.4 °F (36.9 °C)-99.9 °F (37.7 °C)] 98.4 °F (36.9 °C)  Pulse:  [] 74  Resp:  [10-18] 11  SpO2:  [91 %-98 %] 98 %  BP: (104-131)/(55-74) 106/55  Arterial Line BP: (112-116)/(54-56) 112/56  I/O last 3 completed shifts:  In: 250 [Blood:250]  Out: 800 [Urine:800]  I/O this shift:  In: 1950 [I.V.:1700; Blood:250]  Out: 1900 [Urine:1200; Blood:700]    A&O x3, in mild distress, resting.  Normal respiratory effort, CO2 monitor in place.  Abdomen is moderately distended, soft. TTP in all 4 quadrants, worse near midline incision.  Bandage with minimal bloody discharge that has been outlined by nursing.   Schofield draining clear yellow urine.  KELLY/SCD in place      Recent Labs  Lab 09/25/17  0853   WBC 31.82*   RBC 3.29*   HGB 10.1*   HCT 29.5*      MCV 90   MCH 30.7   MCHC 34.2       Recent Labs  Lab 09/25/17  0853   INR 1.0   APTT 32.2*   PT 11.3  Fibrinogen 387    -Will increase rate and max dose on PCA.  -Hold off on NSAID's at this time. Continue scheduled IV tylenol.       Breana Lawrence M.D.  PGY-3 ObGyn  [phone]

## 2017-09-25 NOTE — SUBJECTIVE & OBJECTIVE
"    Obstetric History       T2      L3     SAB0   TAB0   Ectopic0   Multiple0   Live Births3       # Outcome Date GA Lbr Nixon/2nd Weight Sex Delivery Anes PTL Lv   4  17 34w0d  2.25 kg (4 lb 15.4 oz) M CS-Classical Spinal, EPI N FREDY      Name: MARIA LUISA ASIF      Apgar1:  9                Apgar5: 9   3 Term 13 39w0d  3.525 kg (7 lb 12.3 oz) F CS-LTranv Spinal N FREDY      Name: LAYA,BABY GIRL       Apgar1:  9               Apgar5: 10   2 Term 2009   3.345 kg (7 lb 6 oz) M CS-LTranv   FREDY   1 SAB                 Past Medical History:   Diagnosis Date    BV (bacterial vaginosis)     Migraines      Past Surgical History:   Procedure Laterality Date     SECTION      L Arm surgery      Primary Cesarian           (Not in a hospital admission)    Review of patient's allergies indicates:  No Known Allergies     Family History     Problem Relation (Age of Onset)    Cancer Paternal Grandmother    Lung cancer Maternal Grandfather        Social History Main Topics    Smoking status: Former Smoker     Types: Cigarettes     Quit date: 2016    Smokeless tobacco: Never Used    Alcohol use No    Drug use: No      Comment: +THC on 3/13/2017, states "has smoked since then"    Sexual activity: Yes     Partners: Male     Review of Systems   Constitutional: Negative for chills and fever.   Eyes: Negative for visual disturbance.   Respiratory: Negative for shortness of breath.    Cardiovascular: Negative for chest pain and palpitations.   Gastrointestinal: Positive for abdominal pain, nausea and vomiting.   Genitourinary: Positive for vaginal bleeding. Negative for vaginal discharge.   Musculoskeletal: Positive for back pain.   Neurological: Negative for seizures, syncope and headaches.   Hematological: Does not bruise/bleed easily.   Psychiatric/Behavioral: The patient is not nervous/anxious.       Objective:     Vital Signs (Most Recent):  Temp: 99.9 °F (37.7 °C) " (09/25/17 0422)  Pulse: (!) 115 (09/25/17 0422)  Resp: 18 (09/25/17 0422)  BP: (!) 108/57 (09/25/17 0422)  SpO2: (!) 91 % (09/25/17 0421) Vital Signs (24h Range):  Temp:  [99.9 °F (37.7 °C)] 99.9 °F (37.7 °C)  Pulse:  [115-126] 115  Resp:  [18] 18  SpO2:  [91 %-97 %] 91 %  BP: (108-119)/(57-72) 108/57     BMI 30    No LMP recorded.    Physical Exam:   Constitutional: She is oriented to person, place, and time. She appears well-developed and well-nourished. She appears distressed (appears in significant pain).   Appears pale       Cardiovascular: Normal rate.    Tachycardic    Pulmonary/Chest: Effort normal. No respiratory distress.        Abdominal: Soft. Bowel sounds are normal. She exhibits abdominal incision (vertical midline, staples in place, c/d/i, mild surrounding erythema, no induration or flutuance noted, no drainage). She exhibits no distension. There is tenderness (mild). There is no rebound and no guarding.     Genitourinary:   Genitourinary Comments: Moderate amount of blood clots cleared from vault. Cervix unale to fully visualize as os significantly dilated, at least ~6 cm. Placenta bulging through os. No active bleeding coming from uterus.            Musculoskeletal: Moves all extremeties. She exhibits no edema.       Neurological: She is alert and oriented to person, place, and time.    Skin: Skin is warm and dry.    Psychiatric: She has a normal mood and affect. Her behavior is normal.       Laboratory:  CBC at outside ED 11/34  Admit labs pending

## 2017-09-26 PROBLEM — R50.9 FEVER AND CHILLS: Status: ACTIVE | Noted: 2017-09-26

## 2017-09-26 LAB
ANION GAP SERPL CALC-SCNC: 8 MMOL/L
APTT BLDCRRT: 35.5 SEC
BASOPHILS # BLD AUTO: 0.01 K/UL
BASOPHILS NFR BLD: 0.1 %
BUN SERPL-MCNC: 3 MG/DL
CALCIUM SERPL-MCNC: 8 MG/DL
CHLORIDE SERPL-SCNC: 108 MMOL/L
CO2 SERPL-SCNC: 19 MMOL/L
CREAT SERPL-MCNC: 0.5 MG/DL
DIFFERENTIAL METHOD: ABNORMAL
EOSINOPHIL # BLD AUTO: 0 K/UL
EOSINOPHIL NFR BLD: 0.3 %
ERYTHROCYTE [DISTWIDTH] IN BLOOD BY AUTOMATED COUNT: 13.8 %
EST. GFR  (AFRICAN AMERICAN): >60 ML/MIN/1.73 M^2
EST. GFR  (NON AFRICAN AMERICAN): >60 ML/MIN/1.73 M^2
FIBRINOGEN PPP-MCNC: 424 MG/DL
GLUCOSE SERPL-MCNC: 87 MG/DL
HCT VFR BLD AUTO: 27.7 %
HGB BLD-MCNC: 9.1 G/DL
INR PPP: 1
LYMPHOCYTES # BLD AUTO: 0.6 K/UL
LYMPHOCYTES NFR BLD: 6.5 %
MCH RBC QN AUTO: 29.6 PG
MCHC RBC AUTO-ENTMCNC: 32.9 G/DL
MCV RBC AUTO: 90 FL
MONOCYTES # BLD AUTO: 0.5 K/UL
MONOCYTES NFR BLD: 5.6 %
NEUTROPHILS # BLD AUTO: 8.1 K/UL
NEUTROPHILS NFR BLD: 86.7 %
PLATELET # BLD AUTO: 165 K/UL
PMV BLD AUTO: 9.6 FL
POTASSIUM SERPL-SCNC: 4 MMOL/L
PROTHROMBIN TIME: 11.1 SEC
RBC # BLD AUTO: 3.07 M/UL
SODIUM SERPL-SCNC: 135 MMOL/L
WBC # BLD AUTO: 9.29 K/UL

## 2017-09-26 PROCEDURE — 85610 PROTHROMBIN TIME: CPT

## 2017-09-26 PROCEDURE — 94799 UNLISTED PULMONARY SVC/PX: CPT

## 2017-09-26 PROCEDURE — 36415 COLL VENOUS BLD VENIPUNCTURE: CPT

## 2017-09-26 PROCEDURE — 85384 FIBRINOGEN ACTIVITY: CPT

## 2017-09-26 PROCEDURE — 63600175 PHARM REV CODE 636 W HCPCS: Performed by: STUDENT IN AN ORGANIZED HEALTH CARE EDUCATION/TRAINING PROGRAM

## 2017-09-26 PROCEDURE — 99900035 HC TECH TIME PER 15 MIN (STAT)

## 2017-09-26 PROCEDURE — 94770 HC EXHALED C02 TEST: CPT

## 2017-09-26 PROCEDURE — 85730 THROMBOPLASTIN TIME PARTIAL: CPT

## 2017-09-26 PROCEDURE — 11000001 HC ACUTE MED/SURG PRIVATE ROOM

## 2017-09-26 PROCEDURE — 25000003 PHARM REV CODE 250: Performed by: OBSTETRICS & GYNECOLOGY

## 2017-09-26 PROCEDURE — 63600175 PHARM REV CODE 636 W HCPCS: Performed by: OBSTETRICS & GYNECOLOGY

## 2017-09-26 PROCEDURE — 94761 N-INVAS EAR/PLS OXIMETRY MLT: CPT

## 2017-09-26 PROCEDURE — 80048 BASIC METABOLIC PNL TOTAL CA: CPT

## 2017-09-26 PROCEDURE — 85025 COMPLETE CBC W/AUTO DIFF WBC: CPT

## 2017-09-26 PROCEDURE — 25000003 PHARM REV CODE 250: Performed by: STUDENT IN AN ORGANIZED HEALTH CARE EDUCATION/TRAINING PROGRAM

## 2017-09-26 RX ORDER — ACETAMINOPHEN 325 MG/1
650 TABLET ORAL ONCE
Status: COMPLETED | OUTPATIENT
Start: 2017-09-26 | End: 2017-09-26

## 2017-09-26 RX ORDER — SODIUM CHLORIDE, SODIUM LACTATE, POTASSIUM CHLORIDE, CALCIUM CHLORIDE 600; 310; 30; 20 MG/100ML; MG/100ML; MG/100ML; MG/100ML
INJECTION, SOLUTION INTRAVENOUS CONTINUOUS
Status: DISCONTINUED | OUTPATIENT
Start: 2017-09-26 | End: 2017-09-28

## 2017-09-26 RX ORDER — BUTALBITAL, ACETAMINOPHEN AND CAFFEINE 50; 325; 40 MG/1; MG/1; MG/1
2 TABLET ORAL ONCE
Status: COMPLETED | OUTPATIENT
Start: 2017-09-26 | End: 2017-09-26

## 2017-09-26 RX ADMIN — DEXTROSE MONOHYDRATE, SODIUM CHLORIDE, AND POTASSIUM CHLORIDE: 50; 4.5; 1.49 INJECTION, SOLUTION INTRAVENOUS at 03:09

## 2017-09-26 RX ADMIN — VANCOMYCIN HYDROCHLORIDE 1250 MG: 1 INJECTION, POWDER, LYOPHILIZED, FOR SOLUTION INTRAVENOUS at 07:09

## 2017-09-26 RX ADMIN — ONDANSETRON 8 MG: 8 TABLET, ORALLY DISINTEGRATING ORAL at 11:09

## 2017-09-26 RX ADMIN — ONDANSETRON 8 MG: 8 TABLET, ORALLY DISINTEGRATING ORAL at 12:09

## 2017-09-26 RX ADMIN — Medication: at 05:09

## 2017-09-26 RX ADMIN — PIPERACILLIN SODIUM,TAZOBACTAM SODIUM 4.5 G: 4; .5 INJECTION, POWDER, FOR SOLUTION INTRAVENOUS at 02:09

## 2017-09-26 RX ADMIN — ACETAMINOPHEN 1000 MG: 10 INJECTION, SOLUTION INTRAVENOUS at 05:09

## 2017-09-26 RX ADMIN — SODIUM CHLORIDE, SODIUM LACTATE, POTASSIUM CHLORIDE, AND CALCIUM CHLORIDE: 600; 310; 30; 20 INJECTION, SOLUTION INTRAVENOUS at 02:09

## 2017-09-26 RX ADMIN — ACETAMINOPHEN 650 MG: 325 TABLET ORAL at 02:09

## 2017-09-26 RX ADMIN — PIPERACILLIN SODIUM,TAZOBACTAM SODIUM 4.5 G: 4; .5 INJECTION, POWDER, FOR SOLUTION INTRAVENOUS at 10:09

## 2017-09-26 RX ADMIN — Medication: at 12:09

## 2017-09-26 RX ADMIN — VANCOMYCIN HYDROCHLORIDE 1250 MG: 1 INJECTION, POWDER, LYOPHILIZED, FOR SOLUTION INTRAVENOUS at 08:09

## 2017-09-26 RX ADMIN — Medication: at 08:09

## 2017-09-26 RX ADMIN — BUTALBITAL, ACETAMINOPHEN AND CAFFEINE 2 TABLET: 50; 325; 40 TABLET ORAL at 05:09

## 2017-09-26 RX ADMIN — PIPERACILLIN SODIUM,TAZOBACTAM SODIUM 4.5 G: 4; .5 INJECTION, POWDER, FOR SOLUTION INTRAVENOUS at 07:09

## 2017-09-26 NOTE — ASSESSMENT & PLAN NOTE
- multiple fevers overnight.  Max 101 @ 1945  - WBC 27 on admit.  27>31.8>9.3  - BCx at [facility] growing Gram positive cocci. Will f/u BCx  - Will start on Vanc/Zosyn

## 2017-09-26 NOTE — NURSING TRANSFER
Nursing Transfer Note      9/26/2017     Transfer To: 392     Transfer via bed    Transfer with  to O2    Transported by Transport and RN    Medicines sent: Dilaudid PCA pump     Chart send with patient: Yes    Notified: spouse    Patient reassessed at: 9/26/17, 1215    Upon arrival to floor: patient oriented to room, call bell in reach and bed in lowest position

## 2017-09-26 NOTE — SUBJECTIVE & OBJECTIVE
Interval History:  Pt see and examined this morning.  Overnight, pt with two fevers.  Pt reports that she had been having fevers for the past couple days prior to presenting with bleeding. Reports feeling as though she has a fever this AM.  Pt reports abdominal pain that is partially controlled with pain medication.  Tolerating sips of liquids.  Notes a small amount of nausea yesterday that has resolved.  Denies nausea/vomiting at this time.  Schofield in place, draining well.  Has not yet ambulated.  Pt denies fever/chills.       Scheduled Meds:   Continuous Infusions:   dextrose 5 % and 0.45 % NaCl with KCl 20 mEq 125 mL/hr at 09/26/17 0344    hydromorphone in 0.9 % NaCl 6 mg/30 ml      lactated ringers 125 mL/hr at 09/25/17 0531     PRN Meds:naloxone, ondansetron, promethazine (PHENERGAN) IVPB, sodium chloride 0.9%    Review of patient's allergies indicates:  No Known Allergies    Objective:     Vital Signs (Most Recent):  Temp: (!) 100.9 °F (38.3 °C) (09/26/17 0533)  Pulse: (!) 118 (09/26/17 0600)  Resp: 16 (09/26/17 0600)  BP: 126/69 (09/25/17 2000)  SpO2: 100 % (09/26/17 0600) Vital Signs (24h Range):  Temp:  [98.4 °F (36.9 °C)-101 °F (38.3 °C)] 100.9 °F (38.3 °C)  Pulse:  [] 118  Resp:  [10-18] 16  SpO2:  [94 %-100 %] 100 %  BP: ()/(53-74) 126/69  Arterial Line BP: (102-160)/(50-70) 140/66     Weight: 84.9 kg (187 lb 2.7 oz)  Body mass index is 28.46 kg/m².  Patient's last menstrual period was 11/11/2013.    I&O (Last 24H):      Intake/Output - Last 3 Shifts       09/24 0700 - 09/25 0659 09/25 0700 - 09/26 0659    P.O.  480    I.V. (mL/kg)  4289.2 (50.5)    Blood 250 250    IV Piggyback  300    Total Intake(mL/kg) 250 5319.2 (62.7)    Urine (mL/kg/hr) 800 3900 (1.9)    Blood  700 (0.3)    Total Output 800 4600    Net -550 +719.2                    Laboratory:  BMP:   Recent Labs  Lab 09/25/17  0853   *      K 3.9   *   CO2 18*   BUN 6   CREATININE 0.5   CALCIUM 7.3*     CBC:    Recent Labs  Lab 09/26/17  0408   WBC 9.29   RBC 3.07*   HGB 9.1*   HCT 27.7*      MCV 90   MCH 29.6   MCHC 32.9       Diagnostic Results:  None    Physical Exam:   Constitutional: She is oriented to person, place, and time. She appears well-developed and well-nourished.    HENT:   Head: Normocephalic and atraumatic.    Eyes: EOM are normal.    Neck: Normal range of motion.    Cardiovascular: Normal rate, regular rhythm, normal heart sounds and intact distal pulses.     Pulmonary/Chest: Effort normal and breath sounds normal.        Abdominal: Soft. She exhibits abdominal incision. She exhibits no distension. There is tenderness (TTP of fundal area). There is no rebound and no guarding.   Mildly hypoactive BS.  Incisions c/d/i. Small amounts of dried blood.  No signs of active bleeding.  No signs of infection             Musculoskeletal: Normal range of motion.       Neurological: She is alert and oriented to person, place, and time. She has normal reflexes.    Skin: Skin is warm and dry.    Psychiatric: She has a normal mood and affect. Her behavior is normal. Thought content normal.

## 2017-09-26 NOTE — PROGRESS NOTES
MD to bedside for assessment. Patient reports pain adequately controlled with PCA. Tolerating clears without N/V. Mild nausea last evening relieved with phenergan. Has not ambulated or voided yet as marie in place. Denies flatus or BM. Minimal VB on pad. C/o mild nosebleed, has NC CO2 monitor in place.    Temp:  [98.4 °F (36.9 °C)-101 °F (38.3 °C)] 99.3 °F (37.4 °C)  Pulse:  [] 98  Resp:  [10-18] 16  SpO2:  [91 %-100 %] 97 %  BP: ()/(53-74) 126/69  Arterial Line BP: (102-160)/(50-70) 110/54    GEN: No apparent distress. Alert and oriented.   CV: Regular rate and rhythm.   LUNGS: Clear to auscultation bilaterally.  ABDOMEN: Soft, appropriately tender, mildly distended. Hypoactive bowel sounds. No guarding or rebound tenderness.  INCISION: bandage in place, small amount of dried bloody drainage, no active bleeding  EXT: No erythema, no edema, TEDs/SCDs in place  PELVIC: deferred, marie in place draining clear blood-tinged urine    POD#1 s/p ExLap/LINSEY  - continue current postop care  - fevers of 101 and 100.5 noted, call parameters updated to alert OB team with any fevers. Unlikely that patient has infection this close to surgery, more likely postop febrile morbidity. Continue to monitor closely.  - repeat CBC, BMP, and coags in AM    Aspen Vaughn MD  OBGYN - PGY 3

## 2017-09-26 NOTE — ASSESSMENT & PLAN NOTE
- patient in severe pain, actively trying to deliver placenta on admit  - Brought to OR by Gyn onc for LINSEY  - consents for LINSEY/BS and blood discussed, signed and to chart  - likely source of infection

## 2017-09-26 NOTE — NURSING
Received call from [facility] concerning results of blood cultures drawn showing gram positive cocci in one anaerobic bottle.  Dr. Boyce at patient's bedside and notified.

## 2017-09-26 NOTE — ASSESSMENT & PLAN NOTE
-continue routine advances  -PCA for pain control and scheduled acetaminophen. Will continue PCA until ensured that tolerating PO  -Diet sips of water with  IVF @ 125cc/hr.  Will ADAT today  -Zofran available PRN nausea  -CBC: 9.3/9.1/27.7/165 this AM.  Stable. S/p 2u PRBCs  -Coags: PT/INR normal: 11.1/1.0.  PTT elevated at 35.5 and fibrinogen elevated at 424.  Will continue to monitor  -Marie in place.  Adequate UOP overnight.  Will dc marie today  -Encourage ambulation  -Remove dressing POD#1

## 2017-09-26 NOTE — PROGRESS NOTES
[facility]  Obstetrics & Gynecology  Progress Note    Patient Name: Louisa Morrow  MRN: [id number]  Admission Date: 2017  Primary Care Provider: Waylon Chery MD  Principal Problem: S/P LINSEY (total abdominal hysterectomy)    Subjective:     HPI:  31 yo  with h/o placenta accreta now POD#12 s/p classical  and UAE, placenta in situ, who presents as a transport from [facility] for acute onset abdominal cramping since 8 pm and heavy VB since 1030 pm. Describes pain as intense cramping that comes in waves every 3-4 minutes like labor contractions. Has saturated 3-4 pad/towels since bleeding onset. Denies fever, chills. +N/V x1 with intense pain. Otherwise tolerating PO, normal bladder and bowel function.     Interval History:  Pt see and examined this morning.  Overnight, pt with two fevers.  Pt reports that she had been having fevers for the past couple days prior to presenting with bleeding. Reports feeling as though she has a fever this AM.  Pt reports abdominal pain that is partially controlled with pain medication.  Tolerating sips of liquids.  Notes a small amount of nausea yesterday that has resolved.  Denies nausea/vomiting at this time.  Schofield in place, draining well.  Has not yet ambulated.  Pt denies fever/chills.       Scheduled Meds:   Continuous Infusions:   dextrose 5 % and 0.45 % NaCl with KCl 20 mEq 125 mL/hr at 17 0344    hydromorphone in 0.9 % NaCl 6 mg/30 ml      lactated ringers 125 mL/hr at 17 0531     PRN Meds:naloxone, ondansetron, promethazine (PHENERGAN) IVPB, sodium chloride 0.9%    Review of patient's allergies indicates:  No Known Allergies    Objective:     Vital Signs (Most Recent):  Temp: (!) 100.9 °F (38.3 °C) (17 0533)  Pulse: (!) 118 (17 06)  Resp: 16 (17)  BP: 126/69 (17)  SpO2: 100 % (17) Vital Signs (24h Range):  Temp:  [98.4 °F (36.9 °C)-101 °F (38.3 °C)] 100.9 °F  (38.3 °C)  Pulse:  [] 118  Resp:  [10-18] 16  SpO2:  [94 %-100 %] 100 %  BP: ()/(53-74) 126/69  Arterial Line BP: (102-160)/(50-70) 140/66     Weight: 84.9 kg (187 lb 2.7 oz)  Body mass index is 28.46 kg/m².  Patient's last menstrual period was 11/11/2013.    I&O (Last 24H):      Intake/Output - Last 3 Shifts       09/24 0700 - 09/25 0659 09/25 0700 - 09/26 0659    P.O.  480    I.V. (mL/kg)  4289.2 (50.5)    Blood 250 250    IV Piggyback  300    Total Intake(mL/kg) 250 5319.2 (62.7)    Urine (mL/kg/hr) 800 3900 (1.9)    Blood  700 (0.3)    Total Output 800 4600    Net -550 +719.2                    Laboratory:  BMP:   Recent Labs  Lab 09/25/17  0853   *      K 3.9   *   CO2 18*   BUN 6   CREATININE 0.5   CALCIUM 7.3*     CBC:   Recent Labs  Lab 09/26/17  0408   WBC 9.29   RBC 3.07*   HGB 9.1*   HCT 27.7*      MCV 90   MCH 29.6   MCHC 32.9       Diagnostic Results:  None    Physical Exam:   Constitutional: She is oriented to person, place, and time. She appears well-developed and well-nourished.    HENT:   Head: Normocephalic and atraumatic.    Eyes: EOM are normal.    Neck: Normal range of motion.    Cardiovascular: Normal rate, regular rhythm, normal heart sounds and intact distal pulses.     Pulmonary/Chest: Effort normal and breath sounds normal.        Abdominal: Soft. She exhibits abdominal incision. She exhibits no distension. There is tenderness (TTP of fundal area). There is no rebound and no guarding.   Mildly hypoactive BS.  Incisions c/d/i. Small amounts of dried blood.  No signs of active bleeding.  No signs of infection             Musculoskeletal: Normal range of motion.       Neurological: She is alert and oriented to person, place, and time. She has normal reflexes.    Skin: Skin is warm and dry.    Psychiatric: She has a normal mood and affect. Her behavior is normal. Thought content normal.       Assessment/Plan:     * S/P LINSEY (total abdominal hysterectomy)     -continue routine advances  -PCA for pain control and scheduled acetaminophen. Will continue PCA until ensured that tolerating PO  -Diet sips of water with  IVF @ 125cc/hr.  Will ADAT today  -Zofran available PRN nausea  -CBC: 9.3/9.1/27.7/165 this AM.  Stable. S/p 2u PRBCs  -Coags: PT/INR normal: 11.1/1.0.  PTT elevated at 35.5 and fibrinogen elevated at 424.  Will continue to monitor  -Marie in place.  Adequate UOP overnight.  Will dc marie today  -Encourage ambulation  -Remove dressing POD#1        Fever and chills    - multiple fevers overnight.  Max 101 @ 1945  - WBC 27 on admit.  27>31.8>9.3  - BCx at [facility] growing Gram positive cocci. Will f/u BCx  - Will start on Vanc/Zosyn        Placenta accreta    - patient in severe pain, actively trying to deliver placenta on admit  - Brought to OR by Gyn onc for LINSEY  - consents for LINSEY/BS and blood discussed, signed and to chart  - likely source of infection                Julia Boyce MD  Obstetrics & Gynecology  [facility]

## 2017-09-26 NOTE — PLAN OF CARE
Problem: Patient Care Overview  Goal: Plan of Care Review  Outcome: Ongoing (interventions implemented as appropriate)  Patient received on 2L NC 32 ETCO2 f 13. IS performed averaging 1200 mL. Will continue to monitor.

## 2017-09-26 NOTE — PROGRESS NOTES
Pt resting comfortably in bed. States her pain is well controlled when she remembers to use her PCA. She denies fevers or chills, CP, SOB. Vaginal bleeding has been minimal. She has tolerated sips of water without N/V, desires broth for lunch.     Temp: 99.6  BP: 130/60's  Pulse: 's  RR: 14  O2 98%    A&Ox3, NAD  Lungs CTAB with normal respiratory effort  Abdomen distended but soft. Appropriately tender. Hypoactive bowel sounds.   Bandage in place with minimal drainage (areas outlined yesterday have no further bleeding).   SCD/KELLY's in place. 1+ pitting edema bilaterally.      -Continue IV antibiotics. Continue to monitor for fevers.  -Cultures from OSH growing gram + cocci per nurse handoff. Release of records signed. Will fax to [facility].   -Continue PCA for pain control  -Advance diet as tolerated  -Plan to step down to the antepartum floor for further post-op care    Breana Lawrence M.D.  PGY-3 ObGyn  [phone]

## 2017-09-26 NOTE — HOSPITAL COURSE
9/25/2017: Pt admitted and taken to the OR for ex-lap/LINSEY.   09/26/2017: Fevers overnight.  Able to tolerate sips of liquids  09/27/2017: Afebrile overnight. Tolerating PO.  Transitioned to PO pain medication.   09/28/2017: Afebrile overnight.  Tolerating PO.  Pain controlled with PO pain medication.  Cr noted to increase to 3.0. BCx shows peptostreptococcus anaid. Vancomycin stopped.  Ibuprofen stopped and zosyn switched to renal dosing  09/29/2017: Afebrile overnight.   Pt over 48 hours afebrile.  Abx stopped. Cr increased to 3.4  Will keep overnight to assess Cr in AM  9/30: Afebrile overnight. SUKUMAR stable, Cr @3.4, repeat Cr

## 2017-09-26 NOTE — OP NOTE
DATE OF PROCEDURE:  2017    SURGEON:  Garret Small M.D.    ASSISTANT:  Ugo Tafoya who served as first assist and Julia Boyce M.D.   (RES)    PREOPERATIVE DIAGNOSES:  1.  Known placenta increta status post  section followed by uterine   artery embolization approximately 10 days prior.  2.  Vaginal bleeding and cramping.  3.  Anemia.    POSTOPERATIVE DIAGNOSES:    1.  Known placenta increta status post  section followed by uterine   artery embolization approximately 10 days prior.  2.  Vaginal bleeding and cramping.  3.  Anemia.    PROCEDURE:  Exploratory laparotomy, total abdominal hysterectomy, and complete   removal of placenta.    INTRAOPERATIVE FINDINGS:  Included approximately 10 days postpartum uterus and   dilated lower uterine segment with the placenta attempting to be extruded   through the dilated cervix.  Otherwise, the patient had normal appearing   bilateral tubes and ovaries and normal intra-abdominal and intrapelvic anatomy.    COMPLICATIONS:  None.    ESTIMATED BLOOD LOSS:  700 mL with 2 units packed red blood cells replaced.      PROCEDURE IN DETAIL:  After informed consent was obtained, the patient was taken   to the Operating Suite.  General anesthesia was administered and a right   internal jugular central line and a right radial artery arterial line were   placed.  Blood was noted to be available.  The patient was prepped and draped in   usual sterile fashion.  Exam under anesthesia revealed the above stated   findings with approximately 10 cm dilated cervix with the placenta seen at the   os.  The patient's staples were removed and an incision was made through the   patient's prior incision and carried up around the umbilicus.  This was carried   down the fascia with monopolar cautery and the prior PDS sutures were   identified, cut and removed.  The fascia was then opened and the peritoneum was   entered without difficulty.  Exploration of the abdomen revealed the  above   stated findings.  The Bookwalter self-retaining retractor was placed and   adhesions of the omentum to the anterior abdominal wall were managed with   monopolar cautery.  Once this had been performed, the bowel packed out of the   operative field.  Bilateral uterine cornua were grasped with Kocher clamps and   the uterus was elevated.  Adhesions of the bladder peritoneum to the anterior   abdominal wall anteriorly related to her prior  was managed with   monopolar cautery.  The right round ligament was identified and ligated with #1   chromic suture.  It was then transected and the anterior and posterior leaflets   of the broad ligament were opened.  Pararectal space was developed and the   ureter was identified in the pararectal space.  It was noted to be dilated.  A   window was made beneath the uteroovarian ligament and this was clamped and   doubly ligated.  The peritoneum was then skeletonized along the posterior uterus   with care taken to avoid injury to the ureter.  At this point in time, the   vesicouterine peritoneum was incised and the bladder adhesions were managed with   monopolar cautery.  Once this had been performed, attention was turned to the   left side, which was dissected in an identical manner.  Bilateral uterine   vessels were then skeletonized and attention was turned to the bladder   adhesions.  Care was taken to avoid any excursions of the placenta through the   anterior uterine wall.  There were none.  Bladder was mobilized with monopolar   cautery.  A separate perforating vessels were managed with monopolar cautery.    Good hemostasis was noted.  Once the bladder had been mobilized inferiorly   adequately curved clamps were placed what was thought to be the level of the   internal cervical os.  These pedicles were cut and suture ligated with #1   chromic suture.  Serial straight clamps were then used to come down the cardinal   ligaments bilaterally x3, all pedicles  secured with #1 chromic suture.  At this   point in time, an EEA sizer was placed in the anterior cul-de-sac and the   cervical vaginal junction was defined.  An anterior colpotomy was made without   difficulty and this was carried circumferentially around by clamping with curved   clamps.  Once the cervix had been completely freed.  The uterus and placenta   were handed off.  There was no evidence of any placenta that was left in the   vagina.  Clots in the vagina were then evacuated and the vaginal angles were   secured with Aries transfixion sutures.  The cuff was closed with interrupted   figure-of-eight sutures.  Pelvis was irrigated.  Left the bleeders along the   bladder dome as well as the lateral vaginal sidewall were managed with monopolar   cautery.  Prior perforating vessels and collaterals in the left pararectal   space related to the infundibulopelvic ligament were inspected after ligation   and also noted to be hemostatic.  The pelvis was again irrigated.  Fibrillar was   applied.  Once hemostasis was noted, all pedicles were inspected and noted to   be hemostatic.  At this point in time, the Bookwalter self-retaining retractor   and laparotomy sponges were removed.  Once counts were correct x2, the fascia   was grasped with Kocher clamps and the fascia was closed in running nonlocking   fashion using loop PDS suture.  This was tied in the midline.  Subcutaneous   tissue was made hemostatic with electrocautery skin was mobilized.  Subcutaneous   tissues were reapproximated using plain gut suture.  Subcuticular suture of 4-0   Monocryl was then used to close the skin.  Steri-Strips and pressure dressings   were applied and the patient was awoken and taken to Recovery Room in stable   condition.  Of note, I was present for and performed all key aspects of   procedure.  Dr. Tafoya's expertise was needed as there was no qualified resident   available.  Plans were to take the patient to the ICU after  dhiraj BOWERS/IN  dd: 09/25/2017 16:18:39 (CDT)  td: 09/25/2017 21:52:22 (ETHANT)  Doc ID   #[id number]  Job ID #[id number]    CC:

## 2017-09-27 LAB
ANION GAP SERPL CALC-SCNC: 10 MMOL/L
BASOPHILS # BLD AUTO: ABNORMAL K/UL
BASOPHILS NFR BLD: 1 %
BUN SERPL-MCNC: 7 MG/DL
CALCIUM SERPL-MCNC: 8.4 MG/DL
CHLORIDE SERPL-SCNC: 104 MMOL/L
CO2 SERPL-SCNC: 22 MMOL/L
CREAT SERPL-MCNC: 1.2 MG/DL
DIFFERENTIAL METHOD: ABNORMAL
EOSINOPHIL # BLD AUTO: ABNORMAL K/UL
EOSINOPHIL NFR BLD: 2 %
ERYTHROCYTE [DISTWIDTH] IN BLOOD BY AUTOMATED COUNT: 13.2 %
EST. GFR  (AFRICAN AMERICAN): >60 ML/MIN/1.73 M^2
EST. GFR  (NON AFRICAN AMERICAN): 60 ML/MIN/1.73 M^2
GIANT PLATELETS BLD QL SMEAR: PRESENT
GLUCOSE SERPL-MCNC: 100 MG/DL
HCT VFR BLD AUTO: 23.8 %
HGB BLD-MCNC: 8.1 G/DL
LYMPHOCYTES # BLD AUTO: ABNORMAL K/UL
LYMPHOCYTES NFR BLD: 12 %
MCH RBC QN AUTO: 30.3 PG
MCHC RBC AUTO-ENTMCNC: 34 G/DL
MCV RBC AUTO: 89 FL
MONOCYTES # BLD AUTO: ABNORMAL K/UL
MONOCYTES NFR BLD: 6 %
NEUTROPHILS NFR BLD: 66 %
NEUTS BAND NFR BLD MANUAL: 13 %
PLATELET # BLD AUTO: 189 K/UL
PLATELET BLD QL SMEAR: ABNORMAL
PMV BLD AUTO: 9.4 FL
POTASSIUM SERPL-SCNC: 3.7 MMOL/L
RBC # BLD AUTO: 2.67 M/UL
SODIUM SERPL-SCNC: 136 MMOL/L
VANCOMYCIN TROUGH SERPL-MCNC: 12.6 UG/ML
VANCOMYCIN TROUGH SERPL-MCNC: 29.2 UG/ML
WBC # BLD AUTO: 9.15 K/UL

## 2017-09-27 PROCEDURE — 25000003 PHARM REV CODE 250: Performed by: OBSTETRICS & GYNECOLOGY

## 2017-09-27 PROCEDURE — 11000001 HC ACUTE MED/SURG PRIVATE ROOM

## 2017-09-27 PROCEDURE — 80202 ASSAY OF VANCOMYCIN: CPT | Mod: 91

## 2017-09-27 PROCEDURE — 94799 UNLISTED PULMONARY SVC/PX: CPT

## 2017-09-27 PROCEDURE — 94761 N-INVAS EAR/PLS OXIMETRY MLT: CPT

## 2017-09-27 PROCEDURE — 63600175 PHARM REV CODE 636 W HCPCS: Performed by: OBSTETRICS & GYNECOLOGY

## 2017-09-27 PROCEDURE — 85027 COMPLETE CBC AUTOMATED: CPT

## 2017-09-27 PROCEDURE — 80048 BASIC METABOLIC PNL TOTAL CA: CPT

## 2017-09-27 PROCEDURE — 85007 BL SMEAR W/DIFF WBC COUNT: CPT

## 2017-09-27 PROCEDURE — 80202 ASSAY OF VANCOMYCIN: CPT

## 2017-09-27 PROCEDURE — 99900035 HC TECH TIME PER 15 MIN (STAT)

## 2017-09-27 PROCEDURE — 36415 COLL VENOUS BLD VENIPUNCTURE: CPT

## 2017-09-27 PROCEDURE — 63600175 PHARM REV CODE 636 W HCPCS: Performed by: STUDENT IN AN ORGANIZED HEALTH CARE EDUCATION/TRAINING PROGRAM

## 2017-09-27 RX ORDER — IBUPROFEN 600 MG/1
600 TABLET ORAL EVERY 6 HOURS
Status: DISCONTINUED | OUTPATIENT
Start: 2017-09-27 | End: 2017-09-28

## 2017-09-27 RX ORDER — SODIUM CHLORIDE AND POTASSIUM CHLORIDE 150; 900 MG/100ML; MG/100ML
INJECTION, SOLUTION INTRAVENOUS CONTINUOUS
Status: DISCONTINUED | OUTPATIENT
Start: 2017-09-27 | End: 2017-09-29

## 2017-09-27 RX ORDER — OXYCODONE AND ACETAMINOPHEN 5; 325 MG/1; MG/1
1 TABLET ORAL EVERY 4 HOURS PRN
Status: DISCONTINUED | OUTPATIENT
Start: 2017-09-27 | End: 2017-09-30 | Stop reason: HOSPADM

## 2017-09-27 RX ORDER — HYDROMORPHONE HYDROCHLORIDE 1 MG/ML
0.5 INJECTION, SOLUTION INTRAMUSCULAR; INTRAVENOUS; SUBCUTANEOUS
Status: DISCONTINUED | OUTPATIENT
Start: 2017-09-27 | End: 2017-09-30 | Stop reason: HOSPADM

## 2017-09-27 RX ORDER — OXYCODONE AND ACETAMINOPHEN 10; 325 MG/1; MG/1
1 TABLET ORAL EVERY 4 HOURS PRN
Status: DISCONTINUED | OUTPATIENT
Start: 2017-09-27 | End: 2017-09-30 | Stop reason: HOSPADM

## 2017-09-27 RX ADMIN — IBUPROFEN 600 MG: 600 TABLET, FILM COATED ORAL at 12:09

## 2017-09-27 RX ADMIN — ONDANSETRON 8 MG: 8 TABLET, ORALLY DISINTEGRATING ORAL at 08:09

## 2017-09-27 RX ADMIN — PIPERACILLIN SODIUM,TAZOBACTAM SODIUM 4.5 G: 4; .5 INJECTION, POWDER, FOR SOLUTION INTRAVENOUS at 06:09

## 2017-09-27 RX ADMIN — VANCOMYCIN HYDROCHLORIDE 1250 MG: 1 INJECTION, POWDER, LYOPHILIZED, FOR SOLUTION INTRAVENOUS at 07:09

## 2017-09-27 RX ADMIN — POTASSIUM CHLORIDE AND SODIUM CHLORIDE: 900; 150 INJECTION, SOLUTION INTRAVENOUS at 12:09

## 2017-09-27 RX ADMIN — PROMETHAZINE HYDROCHLORIDE 6.25 MG: 25 INJECTION INTRAMUSCULAR; INTRAVENOUS at 12:09

## 2017-09-27 RX ADMIN — Medication: at 03:09

## 2017-09-27 RX ADMIN — OXYCODONE HYDROCHLORIDE AND ACETAMINOPHEN 1 TABLET: 10; 325 TABLET ORAL at 08:09

## 2017-09-27 RX ADMIN — IBUPROFEN 600 MG: 600 TABLET, FILM COATED ORAL at 11:09

## 2017-09-27 RX ADMIN — IBUPROFEN 600 MG: 600 TABLET, FILM COATED ORAL at 05:09

## 2017-09-27 RX ADMIN — OXYCODONE HYDROCHLORIDE AND ACETAMINOPHEN 1 TABLET: 10; 325 TABLET ORAL at 04:09

## 2017-09-27 RX ADMIN — PIPERACILLIN SODIUM,TAZOBACTAM SODIUM 4.5 G: 4; .5 INJECTION, POWDER, FOR SOLUTION INTRAVENOUS at 04:09

## 2017-09-27 RX ADMIN — PROMETHAZINE HYDROCHLORIDE 6.25 MG: 25 INJECTION INTRAMUSCULAR; INTRAVENOUS at 10:09

## 2017-09-27 RX ADMIN — PIPERACILLIN SODIUM,TAZOBACTAM SODIUM 4.5 G: 4; .5 INJECTION, POWDER, FOR SOLUTION INTRAVENOUS at 11:09

## 2017-09-27 RX ADMIN — HYDROMORPHONE HYDROCHLORIDE 0.5 MG: 1 INJECTION, SOLUTION INTRAMUSCULAR; INTRAVENOUS; SUBCUTANEOUS at 10:09

## 2017-09-27 RX ADMIN — IBUPROFEN 600 MG: 600 TABLET, FILM COATED ORAL at 06:09

## 2017-09-27 RX ADMIN — OXYCODONE HYDROCHLORIDE AND ACETAMINOPHEN 1 TABLET: 10; 325 TABLET ORAL at 12:09

## 2017-09-27 RX ADMIN — OXYCODONE HYDROCHLORIDE AND ACETAMINOPHEN 1 TABLET: 10; 325 TABLET ORAL at 06:09

## 2017-09-27 NOTE — PLAN OF CARE
Problem: Patient Care Overview  Goal: Plan of Care Review  Outcome: Ongoing (interventions implemented as appropriate)  Pt tolerating PO well, no acute distress, ambulating and voiding without difficulty, bleeding light, pain well controlled on PCA pump. Pt feeling feverish through night. Highest temp 100.1. Nausea well controlled with prescribed nausea meds. Will continue to monitor.

## 2017-09-27 NOTE — PLAN OF CARE
Problem: Patient Care Overview  Goal: Plan of Care Review  Pt currently on 1.5LPM nasal cannula   . Pt in no distress at this time. Sats 98  %. Will continue to monitor.  IS complete

## 2017-09-27 NOTE — PLAN OF CARE
Problem: Patient Care Overview  Goal: Plan of Care Review  Outcome: Ongoing (interventions implemented as appropriate)  Patient on RA sat's 96% with no distress noted IS done with fair effort will continue to monitor.

## 2017-09-27 NOTE — ASSESSMENT & PLAN NOTE
- Tlast/max 102.5 @1345 on 9/26  - WBC 27 on admit.  27>31.8>9.3> pending this AM  - BCx at [facility] growing Gram positive cocci. Will f/u BCx.  Will call today  - Continue Vanc/Zosyn  - BMP pending. Will watch Cr as on vanc

## 2017-09-27 NOTE — PROGRESS NOTES
[facility]  Obstetrics & Gynecology  Progress Note    Patient Name: Louisa Morrow  MRN: [id number]  Admission Date: 2017  Primary Care Provider: Waylon Chery MD  Principal Problem: S/P LINSEY (total abdominal hysterectomy)    Subjective:     HPI:  31 yo  with h/o placenta accreta now POD#12 s/p classical  and UAE, placenta in situ, who presents as a transport from [facility] for acute onset abdominal cramping since 8 pm and heavy VB since 1030 pm. Describes pain as intense cramping that comes in waves every 3-4 minutes like labor contractions. Has saturated 3-4 pad/towels since bleeding onset. Denies fever, chills. +N/V x1 with intense pain. Otherwise tolerating PO, normal bladder and bowel function.     Interval History:  Pt Afebrile overnight.  Reports feeling hot this morning despite being afebrile. Pt reports abdominal pain that is controlled with pain medication but notes that she has trouble remembering to use the PCA at night.  Tolerating regular diet without diffiulty. Denies nausea/vomiting at this time.  Ambulating to the bathroom without difficult.  Urinating well. Pt denies fever/chills.       Scheduled Meds:   Continuous Infusions:   dextrose 5 % and 0.45 % NaCl with KCl 20 mEq 125 mL/hr at 17 0344    hydromorphone in 0.9 % NaCl 6 mg/30 ml      lactated ringers 125 mL/hr at 17 0531     PRN Meds:naloxone, ondansetron, promethazine (PHENERGAN) IVPB, sodium chloride 0.9%    Review of patient's allergies indicates:  No Known Allergies    Objective:     Vital Signs (Most Recent):  Temp: (!) 100.9 °F (38.3 °C) (17 0533)  Pulse: (!) 118 (17 06)  Resp: 16 (17)  BP: 126/69 (17)  SpO2: 100 % (17) Vital Signs (24h Range):  Temp:  [98.4 °F (36.9 °C)-101 °F (38.3 °C)] 100.9 °F (38.3 °C)  Pulse:  [] 118  Resp:  [10-18] 16  SpO2:  [94 %-100 %] 100 %  BP: ()/(53-74) 126/69  Arterial Line BP:  (102-160)/(50-70) 140/66     Weight: 84.9 kg (187 lb 2.7 oz)  Body mass index is 28.46 kg/m².  Patient's last menstrual period was 11/11/2013.    I&O (Last 24H):      Intake/Output - Last 3 Shifts       09/25 0700 - 09/26 0659 09/26 0700 - 09/27 0659 09/27 0700 - 09/28 0659    P.O. 480      I.V. (mL/kg) 4289.2 (50.5) 229.2 (2.7)     Blood 250      IV Piggyback 300 900     Total Intake(mL/kg) 5319.2 (62.7) 1129.2 (13.3)     Urine (mL/kg/hr) 3900 (1.9) 5250 (2.6)     Other  0 (0)     Blood 700 (0.3)      Total Output 4600 5250      Net +719.2 -4120.8                       Laboratory:  BMP:     Recent Labs  Lab 09/27/17  0657         K 3.7      CO2 22*   BUN 7   CREATININE 1.2   CALCIUM 8.4*     CBC:     Recent Labs  Lab 09/26/17  0408   WBC 9.29   RBC 3.07*   HGB 9.1*   HCT 27.7*      MCV 90   MCH 29.6   MCHC 32.9       Diagnostic Results:  None    Physical Exam:   Constitutional: She is oriented to person, place, and time. She appears well-developed and well-nourished.    HENT:   Head: Normocephalic and atraumatic.    Eyes: EOM are normal.    Neck: Normal range of motion.    Cardiovascular: Normal rate, regular rhythm, normal heart sounds and intact distal pulses.     Pulmonary/Chest: Effort normal and breath sounds normal.        Abdominal: Soft. She exhibits abdominal incision. There is tenderness (TTP of incisional area). There is no rebound and no guarding.   Mildly hypoactive BS.  Incisions c/d/i. Small amounts of dried blood.  No signs of active bleeding.  No signs of infection.  Mildly distended this AM             Musculoskeletal: Normal range of motion.       Neurological: She is alert and oriented to person, place, and time. She has normal reflexes.    Skin: Skin is warm and dry.    Psychiatric: She has a normal mood and affect. Her behavior is normal. Thought content normal.       Assessment/Plan:     * S/P LINSEY (total abdominal hysterectomy)    -continue routine advances  -PCA for  pain control.  Will transition to PO pain medication  -regular diet  -Zofran available PRN nausea  -CBC: 9.3/9.1/27.7/165 yesterday.  Stable. S/p 2u PRBCs.  CBC pending this AM  -Adequate UOP overnight.   -Encourage ambulation  -Dressing removed today        Fever and chills    - Tlast/max 102.5 @1345 on 9/26  - WBC 27 on admit.  27>31.8>9.3> pending this AM  - BCx at [facility] growing Gram positive cocci. Will f/u BCx.  Will call today  - Continue Vanc/Zosyn  - BMP pending. Will watch Cr as on vanc        Placenta accreta    - patient in severe pain, actively trying to deliver placenta on admit  - Brought to OR by Gyn onc for LINSEY  - consents for LINSEY/BS and blood discussed, signed and to chart  - likely source of infection                Julia Boyce MD  Obstetrics & Gynecology  [facility]

## 2017-09-27 NOTE — SUBJECTIVE & OBJECTIVE
Interval History:  Pt Afebrile overnight.  Reports feeling hot this morning despite being afebrile. Pt reports abdominal pain that is controlled with pain medication but notes that she has trouble remembering to use the PCA at night.  Tolerating regular diet without diffiulty. Denies nausea/vomiting at this time.  Ambulating to the bathroom without difficult.  Urinating well. Pt denies fever/chills.       Scheduled Meds:   Continuous Infusions:   dextrose 5 % and 0.45 % NaCl with KCl 20 mEq 125 mL/hr at 09/26/17 0344    hydromorphone in 0.9 % NaCl 6 mg/30 ml      lactated ringers 125 mL/hr at 09/25/17 0531     PRN Meds:naloxone, ondansetron, promethazine (PHENERGAN) IVPB, sodium chloride 0.9%    Review of patient's allergies indicates:  No Known Allergies    Objective:     Vital Signs (Most Recent):  Temp: (!) 100.9 °F (38.3 °C) (09/26/17 0533)  Pulse: (!) 118 (09/26/17 0600)  Resp: 16 (09/26/17 0600)  BP: 126/69 (09/25/17 2000)  SpO2: 100 % (09/26/17 0600) Vital Signs (24h Range):  Temp:  [98.4 °F (36.9 °C)-101 °F (38.3 °C)] 100.9 °F (38.3 °C)  Pulse:  [] 118  Resp:  [10-18] 16  SpO2:  [94 %-100 %] 100 %  BP: ()/(53-74) 126/69  Arterial Line BP: (102-160)/(50-70) 140/66     Weight: 84.9 kg (187 lb 2.7 oz)  Body mass index is 28.46 kg/m².  Patient's last menstrual period was 11/11/2013.    I&O (Last 24H):      Intake/Output - Last 3 Shifts       09/25 0700 - 09/26 0659 09/26 0700 - 09/27 0659 09/27 0700 - 09/28 0659    P.O. 480      I.V. (mL/kg) 4289.2 (50.5) 229.2 (2.7)     Blood 250      IV Piggyback 300 900     Total Intake(mL/kg) 5319.2 (62.7) 1129.2 (13.3)     Urine (mL/kg/hr) 3900 (1.9) 5250 (2.6)     Other  0 (0)     Blood 700 (0.3)      Total Output 4600 5250      Net +719.2 -4120.8                       Laboratory:  BMP:     Recent Labs  Lab 09/27/17  0657         K 3.7      CO2 22*   BUN 7   CREATININE 1.2   CALCIUM 8.4*     CBC:     Recent Labs  Lab 09/26/17  0408   WBC  9.29   RBC 3.07*   HGB 9.1*   HCT 27.7*      MCV 90   MCH 29.6   MCHC 32.9       Diagnostic Results:  None    Physical Exam:   Constitutional: She is oriented to person, place, and time. She appears well-developed and well-nourished.    HENT:   Head: Normocephalic and atraumatic.    Eyes: EOM are normal.    Neck: Normal range of motion.    Cardiovascular: Normal rate, regular rhythm, normal heart sounds and intact distal pulses.     Pulmonary/Chest: Effort normal and breath sounds normal.        Abdominal: Soft. She exhibits abdominal incision. There is tenderness (TTP of incisional area). There is no rebound and no guarding.   Mildly hypoactive BS.  Incisions c/d/i. Small amounts of dried blood.  No signs of active bleeding.  No signs of infection.  Mildly distended this AM             Musculoskeletal: Normal range of motion.       Neurological: She is alert and oriented to person, place, and time. She has normal reflexes.    Skin: Skin is warm and dry.    Psychiatric: She has a normal mood and affect. Her behavior is normal. Thought content normal.

## 2017-09-27 NOTE — ASSESSMENT & PLAN NOTE
-continue routine advances  -PCA for pain control.  Will transition to PO pain medication  -regular diet  -Zofran available PRN nausea  -CBC: 9.3/9.1/27.7/165 yesterday.  Stable. S/p 2u PRBCs.  CBC pending this AM  -Adequate UOP overnight.   -Encourage ambulation  -Dressing removed today

## 2017-09-27 NOTE — PLAN OF CARE
Problem: Patient Care Overview  Goal: Plan of Care Review  Outcome: Ongoing (interventions implemented as appropriate)  Pt doing much better this afternoon. Vital signs stable, pt able to ambulate and shower. Tolerating regular diet, passing flatus. Abdominal dressing removed, incision well approximated with sutures and steri strips. Pt states she wants to go to NICU later this evening to see baby.

## 2017-09-28 LAB
ANION GAP SERPL CALC-SCNC: 9 MMOL/L
BASOPHILS # BLD AUTO: 0.01 K/UL
BASOPHILS NFR BLD: 0.1 %
BUN SERPL-MCNC: 14 MG/DL
CALCIUM SERPL-MCNC: 8.7 MG/DL
CHLORIDE SERPL-SCNC: 110 MMOL/L
CO2 SERPL-SCNC: 22 MMOL/L
CREAT SERPL-MCNC: 3 MG/DL
DIFFERENTIAL METHOD: ABNORMAL
EOSINOPHIL # BLD AUTO: 0.3 K/UL
EOSINOPHIL NFR BLD: 2.9 %
ERYTHROCYTE [DISTWIDTH] IN BLOOD BY AUTOMATED COUNT: 13.4 %
EST. GFR  (AFRICAN AMERICAN): 23 ML/MIN/1.73 M^2
EST. GFR  (NON AFRICAN AMERICAN): 20 ML/MIN/1.73 M^2
GLUCOSE SERPL-MCNC: 100 MG/DL
HCT VFR BLD AUTO: 25.7 %
HGB BLD-MCNC: 8.7 G/DL
LYMPHOCYTES # BLD AUTO: 1.5 K/UL
LYMPHOCYTES NFR BLD: 14.2 %
MCH RBC QN AUTO: 30.5 PG
MCHC RBC AUTO-ENTMCNC: 33.9 G/DL
MCV RBC AUTO: 90 FL
MONOCYTES # BLD AUTO: 1.1 K/UL
MONOCYTES NFR BLD: 10.3 %
NEUTROPHILS # BLD AUTO: 7.6 K/UL
NEUTROPHILS NFR BLD: 71.4 %
PLATELET # BLD AUTO: 211 K/UL
PMV BLD AUTO: 9.3 FL
POTASSIUM SERPL-SCNC: 4.1 MMOL/L
RBC # BLD AUTO: 2.85 M/UL
SODIUM SERPL-SCNC: 141 MMOL/L
WBC # BLD AUTO: 10.61 K/UL

## 2017-09-28 PROCEDURE — 25000003 PHARM REV CODE 250: Performed by: STUDENT IN AN ORGANIZED HEALTH CARE EDUCATION/TRAINING PROGRAM

## 2017-09-28 PROCEDURE — 63600175 PHARM REV CODE 636 W HCPCS: Performed by: OBSTETRICS & GYNECOLOGY

## 2017-09-28 PROCEDURE — 85025 COMPLETE CBC W/AUTO DIFF WBC: CPT

## 2017-09-28 PROCEDURE — 25000003 PHARM REV CODE 250: Performed by: OBSTETRICS & GYNECOLOGY

## 2017-09-28 PROCEDURE — 36415 COLL VENOUS BLD VENIPUNCTURE: CPT

## 2017-09-28 PROCEDURE — 94761 N-INVAS EAR/PLS OXIMETRY MLT: CPT

## 2017-09-28 PROCEDURE — 11000001 HC ACUTE MED/SURG PRIVATE ROOM

## 2017-09-28 PROCEDURE — 80048 BASIC METABOLIC PNL TOTAL CA: CPT

## 2017-09-28 RX ORDER — SIMETHICONE 80 MG
1 TABLET,CHEWABLE ORAL 3 TIMES DAILY PRN
Status: DISCONTINUED | OUTPATIENT
Start: 2017-09-28 | End: 2017-09-30 | Stop reason: HOSPADM

## 2017-09-28 RX ORDER — LIDOCAINE HYDROCHLORIDE 10 MG/ML
0.5 INJECTION INFILTRATION; PERINEURAL ONCE
Status: COMPLETED | OUTPATIENT
Start: 2017-09-28 | End: 2017-09-28

## 2017-09-28 RX ADMIN — PIPERACILLIN SODIUM,TAZOBACTAM SODIUM 4.5 G: 4; .5 INJECTION, POWDER, FOR SOLUTION INTRAVENOUS at 04:09

## 2017-09-28 RX ADMIN — IBUPROFEN 600 MG: 600 TABLET, FILM COATED ORAL at 11:09

## 2017-09-28 RX ADMIN — Medication 2.25 G: at 11:09

## 2017-09-28 RX ADMIN — LIDOCAINE HYDROCHLORIDE 0.5 ML: 10 INJECTION, SOLUTION INFILTRATION; PERINEURAL at 04:09

## 2017-09-28 RX ADMIN — POTASSIUM CHLORIDE AND SODIUM CHLORIDE: 900; 150 INJECTION, SOLUTION INTRAVENOUS at 12:09

## 2017-09-28 RX ADMIN — OXYCODONE HYDROCHLORIDE AND ACETAMINOPHEN 1 TABLET: 10; 325 TABLET ORAL at 06:09

## 2017-09-28 RX ADMIN — POTASSIUM CHLORIDE AND SODIUM CHLORIDE: 900; 150 INJECTION, SOLUTION INTRAVENOUS at 04:09

## 2017-09-28 RX ADMIN — POTASSIUM CHLORIDE AND SODIUM CHLORIDE: 900; 150 INJECTION, SOLUTION INTRAVENOUS at 01:09

## 2017-09-28 RX ADMIN — OXYCODONE HYDROCHLORIDE AND ACETAMINOPHEN 1 TABLET: 10; 325 TABLET ORAL at 01:09

## 2017-09-28 RX ADMIN — IBUPROFEN 600 MG: 600 TABLET, FILM COATED ORAL at 06:09

## 2017-09-28 RX ADMIN — SIMETHICONE CHEW TAB 80 MG 80 MG: 80 TABLET ORAL at 09:09

## 2017-09-28 RX ADMIN — OXYCODONE HYDROCHLORIDE AND ACETAMINOPHEN 1 TABLET: 10; 325 TABLET ORAL at 10:09

## 2017-09-28 RX ADMIN — PIPERACILLIN SODIUM,TAZOBACTAM SODIUM 4.5 G: 4; .5 INJECTION, POWDER, FOR SOLUTION INTRAVENOUS at 08:09

## 2017-09-28 RX ADMIN — OXYCODONE HYDROCHLORIDE AND ACETAMINOPHEN 1 TABLET: 10; 325 TABLET ORAL at 12:09

## 2017-09-28 RX ADMIN — SIMETHICONE CHEW TAB 80 MG 80 MG: 80 TABLET ORAL at 04:09

## 2017-09-28 NOTE — PROGRESS NOTES
Back to NICU to visit baby with her sig other; baby to be discahrged today  Requested IV restart after visit

## 2017-09-28 NOTE — PROGRESS NOTES
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 Describes pain as intense cramping that comes in waves every 3-4 minutes like labor contractions. Has saturated 3-4 pad/towels since bleeding onset. Denies fever, chills. +N/V x1 with intense pain. Otherwise tolerating PO, normal bladder and bowel function.     Interval History:  Pt Afebrile overnight.  Denies any fever/chills overnight. Tolerating regular diet without diffiulty. Denies nausea/vomiting at this time.  Pain controlled with PO pain medication.  Pt is passing flatus.  No BMs.  Ambulating to the bathroom without difficult.  Urinating well. Pt denies fever/chills.       Scheduled Meds:   Continuous Infusions:   dextrose 5 % and 0.45 % NaCl with KCl 20 mEq 125 mL/hr at 17 0344    hydromorphone in 0.9 % NaCl 6 mg/30 ml      lactated ringers 125 mL/hr at 17 0531     PRN Meds:naloxone, ondansetron, promethazine (PHENERGAN) IVPB, sodium chloride 0.9%    Review of patient's allergies indicates:  No Known Allergies    Objective:     Vital Signs (Most Recent):  Temp: (!) 100.9 °F (38.3 °C) (17 0533)  Pulse: (!) 118 (17 06)  Resp: 16 (17)  BP: 126/69 (17)  SpO2: 100 % (17) Vital Signs (24h Range):  Temp:  [98.4 °F (36.9 °C)-101 °F (38.3 °C)] 100.9 °F (38.3 °C)  Pulse:  [] 118  Resp:  [10-18] 16  SpO2:  [94 %-100 %] 100 %  BP: ()/(53-74) 126/69  Arterial Line BP: (102-160)/(50-70) 140/66     Weight: 84.9 kg (187 lb 2.7 oz)  Body mass index is  28.46 kg/m².  Patient's last menstrual period was 11/11/2013.    I&O (Last 24H):      Intake/Output - Last 3 Shifts       09/26 0700 - 09/27 0659 09/27 0700 - 09/28 0659    P.O.  600    I.V. (mL/kg) 229.2 (2.7) 1310.4 (15.4)    IV Piggyback 900 400    Total Intake(mL/kg) 1129.2 (13.3) 2310.4 (27.2)    Urine (mL/kg/hr) 5250 (2.6) 1400 (0.7)    Other 0 (0)     Total Output 5250 1400    Net -4120.8 +910.4                    Laboratory:  BMP:     Recent Labs  Lab 09/27/17  0657         K 3.7      CO2 22*   BUN 7   CREATININE 1.2   CALCIUM 8.4*     CBC:     Recent Labs  Lab 09/27/17  1050   WBC 9.15   RBC 2.67*   HGB 8.1*   HCT 23.8*      MCV 89   MCH 30.3   MCHC 34.0       Diagnostic Results:  None    Physical Exam:   Constitutional: She is oriented to person, place, and time. She appears well-developed and well-nourished.    HENT:   Head: Normocephalic and atraumatic.    Eyes: EOM are normal.    Neck: Normal range of motion.    Cardiovascular: Normal rate, regular rhythm, normal heart sounds and intact distal pulses.     Pulmonary/Chest: Effort normal and breath sounds normal.        Abdominal: Soft. She exhibits abdominal incision. There is tenderness (TTP of incisional area). There is no rebound and no guarding.   Normoactive BS.  Incisions c/d/i. No signs of active bleeding.  No signs of infection.  Mildly distended this AM             Musculoskeletal: Normal range of motion.       Neurological: She is alert and oriented to person, place, and time. She has normal reflexes.    Skin: Skin is warm and dry.    Psychiatric: She has a normal mood and affect. Her behavior is normal. Thought content normal.       Assessment/Plan:     * S/P LINSEY (total abdominal hysterectomy)    -continue routine advances  -ibuprofen with percocet PRN.  Dilaudid PRN BTP  -regular diet  -Zofran available PRN nausea  -CBC: 9.15/8.1/23.8/189 yesterday.  Stable. S/p 2u PRBC  -Adequate UOP overnight.   -Encourage  ambulation  -Passing flatus.         Fever and chills    - Tlast/max 102.5 @1345 on 9/26  - WBC 27 on admit.  27>31.8>9.3> pending this AM  - BCx at [facility] growing Gram positive cocci. Speciation and sensitivities not back as of yesterday.  Will call again today.   - Continue Vanc/Zosyn.  Vanc trough 29.2.  Decreased dose from 1500 q12 to 750 q12.  Will stop vanc based on sensitivities  - Cr increased from 0.5>1.2 yesterday.  Placed on IVFs.  BMP pending this AM  - Plan for one more day of IV abx and then will transition to PO abx prior to discharge        Placenta accreta    - patient in severe pain, actively trying to deliver placenta on admit  - Brought to OR by Gyn onc for LINSEY  - consents for LINSEY/BS and blood discussed, signed and to chart  - likely source of infection                Julia Boyce MD  Obstetrics & Gynecology  [facility]

## 2017-09-28 NOTE — PROGRESS NOTES
Vancomycin trough resulted @ 29.2. Dr. Vázquez notified. Dose to be decreased and restarted in the morning. Will continue to monitor patient.

## 2017-09-28 NOTE — ANESTHESIA POSTPROCEDURE EVALUATION
"Anesthesia Post Evaluation    Patient: Louisa Morrow    Procedure(s) Performed: Procedure(s) (LRB):  HYSTERECTOMY-ABDOMINAL-TOTAL (LINSEY) (N/A)    Final Anesthesia Type: general  Patient location during evaluation: floor  Patient participation: Yes- Able to Participate  Level of consciousness: awake and alert  Post-procedure vital signs: reviewed and stable  Pain management: adequate  Airway patency: patent  PONV status at discharge: No PONV  Anesthetic complications: no      Cardiovascular status: blood pressure returned to baseline  Respiratory status: unassisted  Hydration status: euvolemic  Follow-up not needed.        Visit Vitals  /65 (BP Location: Left arm, Patient Position: Lying)   Pulse 89   Temp 36.1 °C (97 °F) (Temporal)   Resp 18   Ht 5' 8" (1.727 m)   Wt 84.9 kg (187 lb 2.7 oz)   LMP 11/11/2013   SpO2 100%   Breastfeeding? No   BMI 28.46 kg/m²       Pain/Jah Score: Pain Rating Prior to Med Admin: 7 (9/28/2017  1:51 PM)  Pain Rating Post Med Admin: 2 (9/28/2017  6:48 AM)      "

## 2017-09-28 NOTE — ASSESSMENT & PLAN NOTE
-continue routine advances  -ibuprofen with percocet PRN.  Dilaudid PRN BTP  -regular diet  -Zofran available PRN nausea  -CBC: 9.15/8.1/23.8/189 yesterday.  Stable. S/p 2u PRBC  -Adequate UOP overnight.   -Encourage ambulation  -Passing flatus.

## 2017-09-28 NOTE — SUBJECTIVE & OBJECTIVE
Interval History:  Pt Afebrile overnight.  Denies any fever/chills overnight. Tolerating regular diet without diffiulty. Denies nausea/vomiting at this time.  Pain controlled with PO pain medication.  Pt is passing flatus.  No BMs.  Ambulating to the bathroom without difficult.  Urinating well. Pt denies fever/chills.       Scheduled Meds:   Continuous Infusions:   dextrose 5 % and 0.45 % NaCl with KCl 20 mEq 125 mL/hr at 09/26/17 0344    hydromorphone in 0.9 % NaCl 6 mg/30 ml      lactated ringers 125 mL/hr at 09/25/17 0531     PRN Meds:naloxone, ondansetron, promethazine (PHENERGAN) IVPB, sodium chloride 0.9%    Review of patient's allergies indicates:  No Known Allergies    Objective:     Vital Signs (Most Recent):  Temp: (!) 100.9 °F (38.3 °C) (09/26/17 0533)  Pulse: (!) 118 (09/26/17 0600)  Resp: 16 (09/26/17 0600)  BP: 126/69 (09/25/17 2000)  SpO2: 100 % (09/26/17 0600) Vital Signs (24h Range):  Temp:  [98.4 °F (36.9 °C)-101 °F (38.3 °C)] 100.9 °F (38.3 °C)  Pulse:  [] 118  Resp:  [10-18] 16  SpO2:  [94 %-100 %] 100 %  BP: ()/(53-74) 126/69  Arterial Line BP: (102-160)/(50-70) 140/66     Weight: 84.9 kg (187 lb 2.7 oz)  Body mass index is 28.46 kg/m².  Patient's last menstrual period was 11/11/2013.    I&O (Last 24H):      Intake/Output - Last 3 Shifts       09/26 0700 - 09/27 0659 09/27 0700 - 09/28 0659    P.O.  600    I.V. (mL/kg) 229.2 (2.7) 1310.4 (15.4)    IV Piggyback 900 400    Total Intake(mL/kg) 1129.2 (13.3) 2310.4 (27.2)    Urine (mL/kg/hr) 5250 (2.6) 1400 (0.7)    Other 0 (0)     Total Output 5250 1400    Net -4120.8 +910.4                    Laboratory:  BMP:     Recent Labs  Lab 09/27/17  0657         K 3.7      CO2 22*   BUN 7   CREATININE 1.2   CALCIUM 8.4*     CBC:     Recent Labs  Lab 09/27/17  1050   WBC 9.15   RBC 2.67*   HGB 8.1*   HCT 23.8*      MCV 89   MCH 30.3   MCHC 34.0       Diagnostic Results:  None    Physical Exam:   Constitutional: She  is oriented to person, place, and time. She appears well-developed and well-nourished.    HENT:   Head: Normocephalic and atraumatic.    Eyes: EOM are normal.    Neck: Normal range of motion.    Cardiovascular: Normal rate, regular rhythm, normal heart sounds and intact distal pulses.     Pulmonary/Chest: Effort normal and breath sounds normal.        Abdominal: Soft. She exhibits abdominal incision. There is tenderness (TTP of incisional area). There is no rebound and no guarding.   Normoactive BS.  Incisions c/d/i. No signs of active bleeding.  No signs of infection.  Mildly distended this AM             Musculoskeletal: Normal range of motion.       Neurological: She is alert and oriented to person, place, and time. She has normal reflexes.    Skin: Skin is warm and dry.    Psychiatric: She has a normal mood and affect. Her behavior is normal. Thought content normal.

## 2017-09-28 NOTE — ASSESSMENT & PLAN NOTE
- Tlast/max 102.5 @1345 on 9/26  - WBC 27 on admit.  27>31.8>9.3> pending this AM  - BCx at [facility] growing Gram positive cocci. Speciation and sensitivities not back as of yesterday.  Will call again today.   - Continue Vanc/Zosyn.  Vanc trough 29.2.  Decreased dose from 1500 q12 to 750 q12.  Will stop vanc based on sensitivities  - Cr increased from 0.5>1.2 yesterday.  Placed on IVFs.  BMP pending this AM  - Plan for one more day of IV abx and then will transition to PO abx prior to discharge

## 2017-09-28 NOTE — OP NOTE
Certification of Assistant at Surgery       Surgery Date: 9/25/2017     Participating Surgeons:  Surgeon(s) and Role:     * Garret Small MD - Primary     * Julia Boyce MD - Resident - Assisting     * Aspen Vaughn MD - Resident - Assisting     * Ugo Tafoya MD - Assisting     * Siri Casillas MD    Procedures:  Procedure(s) (LRB):  HYSTERECTOMY-ABDOMINAL-TOTAL (LINSEY) (N/A)    Assistant Surgeon's Certification of Necessity:  I understand that section 1842 (b) (6) (d) of the Social Security Act generally prohibits Medicare Part B reasonable charge payment for the services of assistants at surgery in teaching hospitals when qualified residents are available to furnish such services. I certify that the services for which payment is claimed were medically necessary, and that no qualified resident was available to perform the services. I further understand that these services are subject to post-payment review by the Medicare carrier.      Ugo Tafoya MD    09/28/2017  9:26 AM

## 2017-09-29 PROBLEM — N17.9 AKI (ACUTE KIDNEY INJURY): Status: ACTIVE | Noted: 2017-09-29

## 2017-09-29 LAB
ANION GAP SERPL CALC-SCNC: 12 MMOL/L
BLD PROD TYP BPU: NORMAL
BLOOD UNIT EXPIRATION DATE: NORMAL
BLOOD UNIT TYPE CODE: 5100
BLOOD UNIT TYPE: NORMAL
BUN SERPL-MCNC: 18 MG/DL
CALCIUM SERPL-MCNC: 8.5 MG/DL
CHLORIDE SERPL-SCNC: 109 MMOL/L
CO2 SERPL-SCNC: 17 MMOL/L
CODING SYSTEM: NORMAL
CREAT SERPL-MCNC: 3.4 MG/DL
DISPENSE STATUS: NORMAL
EST. GFR  (AFRICAN AMERICAN): 20 ML/MIN/1.73 M^2
EST. GFR  (NON AFRICAN AMERICAN): 17 ML/MIN/1.73 M^2
GLUCOSE SERPL-MCNC: 84 MG/DL
NUM UNITS TRANS FFP: NORMAL
NUM UNITS TRANS FFP: NORMAL
NUM UNITS TRANS PACKED RBC: NORMAL
POTASSIUM SERPL-SCNC: 3.8 MMOL/L
SODIUM SERPL-SCNC: 138 MMOL/L
TRANS ERYTHROCYTES VOL PATIENT: NORMAL ML

## 2017-09-29 PROCEDURE — 94761 N-INVAS EAR/PLS OXIMETRY MLT: CPT

## 2017-09-29 PROCEDURE — 63600175 PHARM REV CODE 636 W HCPCS: Performed by: STUDENT IN AN ORGANIZED HEALTH CARE EDUCATION/TRAINING PROGRAM

## 2017-09-29 PROCEDURE — 80048 BASIC METABOLIC PNL TOTAL CA: CPT

## 2017-09-29 PROCEDURE — 25000003 PHARM REV CODE 250: Performed by: STUDENT IN AN ORGANIZED HEALTH CARE EDUCATION/TRAINING PROGRAM

## 2017-09-29 PROCEDURE — 25000003 PHARM REV CODE 250: Performed by: OBSTETRICS & GYNECOLOGY

## 2017-09-29 PROCEDURE — 36415 COLL VENOUS BLD VENIPUNCTURE: CPT

## 2017-09-29 PROCEDURE — 11000001 HC ACUTE MED/SURG PRIVATE ROOM

## 2017-09-29 RX ORDER — HYDROCODONE BITARTRATE AND ACETAMINOPHEN 5; 325 MG/1; MG/1
1 TABLET ORAL EVERY 4 HOURS PRN
Qty: 30 TABLET | Refills: 0 | Status: SHIPPED | OUTPATIENT
Start: 2017-09-29 | End: 2019-02-19

## 2017-09-29 RX ORDER — SODIUM CHLORIDE, SODIUM LACTATE, POTASSIUM CHLORIDE, CALCIUM CHLORIDE 600; 310; 30; 20 MG/100ML; MG/100ML; MG/100ML; MG/100ML
INJECTION, SOLUTION INTRAVENOUS CONTINUOUS
Status: DISCONTINUED | OUTPATIENT
Start: 2017-09-29 | End: 2017-09-29

## 2017-09-29 RX ORDER — SODIUM CHLORIDE 9 MG/ML
INJECTION, SOLUTION INTRAVENOUS CONTINUOUS
Status: DISCONTINUED | OUTPATIENT
Start: 2017-09-29 | End: 2017-09-29

## 2017-09-29 RX ADMIN — OXYCODONE HYDROCHLORIDE AND ACETAMINOPHEN 1 TABLET: 10; 325 TABLET ORAL at 01:09

## 2017-09-29 RX ADMIN — OXYCODONE HYDROCHLORIDE AND ACETAMINOPHEN 1 TABLET: 10; 325 TABLET ORAL at 02:09

## 2017-09-29 RX ADMIN — ONDANSETRON 8 MG: 8 TABLET, ORALLY DISINTEGRATING ORAL at 08:09

## 2017-09-29 RX ADMIN — Medication 2.25 G: at 05:09

## 2017-09-29 RX ADMIN — OXYCODONE HYDROCHLORIDE AND ACETAMINOPHEN 1 TABLET: 10; 325 TABLET ORAL at 07:09

## 2017-09-29 RX ADMIN — SODIUM CHLORIDE, SODIUM LACTATE, POTASSIUM CHLORIDE, AND CALCIUM CHLORIDE: 600; 310; 30; 20 INJECTION, SOLUTION INTRAVENOUS at 02:09

## 2017-09-29 RX ADMIN — ONDANSETRON 8 MG: 8 TABLET, ORALLY DISINTEGRATING ORAL at 06:09

## 2017-09-29 RX ADMIN — OXYCODONE HYDROCHLORIDE AND ACETAMINOPHEN 1 TABLET: 10; 325 TABLET ORAL at 09:09

## 2017-09-29 NOTE — SUBJECTIVE & OBJECTIVE
Interval History:  Pt Afebrile overnight.  Denies any fever/chills overnight. Tolerating regular diet without diffiulty. Denies nausea/vomiting at this time.  Pain controlled with PO pain medication.  Pt is passing flatus.  No BMs.  Ambulating to the bathroom without difficult.  Urinating well. Pt denies fever/chills.       Scheduled Meds:   Continuous Infusions:   dextrose 5 % and 0.45 % NaCl with KCl 20 mEq 125 mL/hr at 09/26/17 0344    hydromorphone in 0.9 % NaCl 6 mg/30 ml      lactated ringers 125 mL/hr at 09/25/17 0531     PRN Meds:naloxone, ondansetron, promethazine (PHENERGAN) IVPB, sodium chloride 0.9%    Review of patient's allergies indicates:  No Known Allergies    Objective:     Vital Signs (Most Recent):  Temp: (!) 100.9 °F (38.3 °C) (09/26/17 0533)  Pulse: (!) 118 (09/26/17 0600)  Resp: 16 (09/26/17 0600)  BP: 126/69 (09/25/17 2000)  SpO2: 100 % (09/26/17 0600) Vital Signs (24h Range):  Temp:  [98.4 °F (36.9 °C)-101 °F (38.3 °C)] 100.9 °F (38.3 °C)  Pulse:  [] 118  Resp:  [10-18] 16  SpO2:  [94 %-100 %] 100 %  BP: ()/(53-74) 126/69  Arterial Line BP: (102-160)/(50-70) 140/66     Weight: 84.9 kg (187 lb 2.7 oz)  Body mass index is 28.46 kg/m².  Patient's last menstrual period was 11/11/2013.    I&O (Last 24H):      Intake/Output - Last 3 Shifts       09/27 0700 - 09/28 0659 09/28 0700 - 09/29 0659 09/29 0700 - 09/30 0659    P.O. 600 4820     I.V. (mL/kg) 1310.4 (15.4) 3710 (43.7)     IV Piggyback 400 100     Total Intake(mL/kg) 2310.4 (27.2) 8630 (101.6)     Urine (mL/kg/hr) 1400 (0.7) 3950 (1.9)     Other       Total Output 1400 3950      Net +910.4 +4680                       Laboratory:  BMP:     Recent Labs  Lab 09/29/17  0415   GLU 84      K 3.8      CO2 17*   BUN 18   CREATININE 3.4*   CALCIUM 8.5*     CBC:     Recent Labs  Lab 09/28/17  0842   WBC 10.61   RBC 2.85*   HGB 8.7*   HCT 25.7*      MCV 90   MCH 30.5   MCHC 33.9       Diagnostic  Results:  None    Physical Exam:   Constitutional: She is oriented to person, place, and time. She appears well-developed and well-nourished.    HENT:   Head: Normocephalic and atraumatic.    Eyes: EOM are normal.    Neck: Normal range of motion.    Cardiovascular: Normal rate, regular rhythm, normal heart sounds and intact distal pulses.     Pulmonary/Chest: Effort normal and breath sounds normal.        Abdominal: Soft. She exhibits abdominal incision. There is tenderness (TTP of incisional area). There is no rebound and no guarding.   Normoactive BS.  Incisions c/d/i. No signs of active bleeding.  No signs of infection.  Mildly distended this AM             Musculoskeletal: Normal range of motion.       Neurological: She is alert and oriented to person, place, and time. She has normal reflexes.    Skin: Skin is warm and dry.    Psychiatric: She has a normal mood and affect. Her behavior is normal. Thought content normal.

## 2017-09-29 NOTE — ASSESSMENT & PLAN NOTE
- Tlast/max 102.5 @1345 on 9/26  - WBC 27 on admit.  27>31.8>9.3  - BCx at [facility] showed Peptostreptoccus anaid. Beta lactamase negative.   - Afebrile for over 48 hours.  Will stop all abx at this time  - Will monitor for fevers.

## 2017-09-29 NOTE — ASSESSMENT & PLAN NOTE
- Cr 0.5>1.2>3.0>3.4  - Abx and nephrotoxic medication stopped  - Will get BMP in AM.   - Pt to stay another night to assess Cr in AM  - [facility] UOP overnight: 3950cc/24 hours

## 2017-09-29 NOTE — PLAN OF CARE
09/29/17 0829   Discharge Assessment   Assessment Type Discharge Planning Reassessment   Confirmed/corrected address and phone number on facesheet? Yes   Assessment information obtained from? Patient   Prior to hospitilization cognitive status: Alert/Oriented   Prior to hospitalization functional status: Independent   Current cognitive status: Alert/Oriented   Current Functional Status: Independent   Does the patient have transportation home? Yes   Transportation Available family or friend will provide   Discharge Plan A Home with family;WIC   Does the patient have transportation to healthcare appointments? Yes       Pt was feeling sad when sw arrived. Pt familiar to sw from previous admission. She is sad that her baby is being d/c'd from NICU today and she will have to remain in the hospital. Helped arrange for baby to RI with mom following d/c from the NICU. Pt was very happy about this. Pt understands that another adult has to be with baby at all times and pt cannot be left responsible for pt. Pt verbalized understanding. Pt called fiance to ensure that all baby's supplies will be brought to hospital. Pt understands that baby is no longer a pt and that she and dad will have to provide all care to baby. Assisted pt to the NICU via wheelchair to visit with baby. Hospital did supply a crib for baby in pt room.     No further sw d/c needs but sw remains available for emotional support if needed.    Irais Phelps LCSW    [facility]  [email or web address]    (phone) [phone] or  Ext. [phone]  (fax) [phone]

## 2017-09-29 NOTE — ASSESSMENT & PLAN NOTE
-continue routine advances  -percocet PRN.  Dilaudid PRN BTP  -regular diet  -Zofran available PRN nausea  -CBC: 9.15/8.1/23.8/189 on 9/27  Stable. S/p 2u PRBC  -Adequate UOP overnight.   -Encourage ambulation  -Passing flatus.

## 2017-09-29 NOTE — PLAN OF CARE
Problem: Patient Care Overview  Goal: Plan of Care Review  Vital signs stable, afebrile, pain controlled c po pain meds; baby discharged from nicu and now rooming in with patient and sig other; continues on iv antibiotics; MDs aware of elevated creatinine level

## 2017-09-29 NOTE — PLAN OF CARE
09/28/17 1930   Patient Assessment/Suction   Level of Consciousness (AVPU) alert   Respiratory Effort Unlabored;Normal   Expansion/Accessory Muscles/Retractions no retractions;expansion symmetric   All Lung Fields Breath Sounds clear;equal bilaterally   PRE-TX-O2-ETCO2   O2 Device (Oxygen Therapy) room air   SpO2 100 %   Pulse Oximetry Type Intermittent   $ Pulse Oximetry - Multiple Charge Pulse Oximetry - Multiple   Pulse 89   Resp 16   Temp 97 °F (36.1 °C)   /84   Incentive Spirometer   Administration (Incentive Spirometer) done independently per patient

## 2017-09-29 NOTE — PROGRESS NOTES
[facility]  Obstetrics & Gynecology  Progress Note    Patient Name: Louisa Morrow  MRN: [id number]  Admission Date: 2017  Primary Care Provider: Waylon Chery MD  Principal Problem: S/P LINSEY (total abdominal hysterectomy)    Subjective:     HPI:  33 yo  with h/o placenta accreta now POD#12 s/p classical  and UAE, placenta in situ, who [place] for acute onset abdominal cramping since 8 pm and heavy VB since 1030 pm. Describes pain as intense cramping that comes in waves every 3-4 minutes like labor contractions. Has saturated 3-4 pad/towels since bleeding onset. Denies fever, chills. +N/V x1 with intense pain. Otherwise tolerating PO, normal bladder and bowel function.     Interval History:  Pt Afebrile overnight.  Denies any fever/chills overnight. Tolerating regular diet without diffiulty. Denies nausea/vomiting at this time.  Pain controlled with PO pain medication.  Pt is passing flatus.  No BMs.  Ambulating to the bathroom without difficult.  Urinating well. Pt denies fever/chills.       Scheduled Meds:   Continuous Infusions:   dextrose 5 % and 0.45 % NaCl with KCl 20 mEq 125 mL/hr at 17 0344    hydromorphone in 0.9 % NaCl 6 mg/30 ml      lactated ringers 125 mL/hr at 17 0531     PRN Meds:naloxone, ondansetron, promethazine (PHENERGAN) IVPB, sodium chloride 0.9%    Review of patient's allergies indicates:  No Known Allergies    Objective:     Vital Signs (Most Recent):  Temp: (!) 100.9 °F (38.3 °C) (17 0533)  Pulse: (!) 118 (17 06)  Resp: 16 (17)  BP: 126/69 (17)  SpO2: 100 % (17) Vital Signs (24h Range):  Temp:  [98.4 °F (36.9 °C)-101 °F (38.3 °C)] 100.9 °F (38.3 °C)  Pulse:  [] 118  Resp:  [10-18] 16  SpO2:  [94 %-100 %] 100 %  BP: ()/(53-74) 126/69  Arterial Line BP: (102-160)/(50-70) 140/66     Weight: 84.9 kg (187 lb 2.7 oz)  Body mass index is  28.46 kg/m².  Patient's last menstrual period was 11/11/2013.    I&O (Last 24H):      Intake/Output - Last 3 Shifts       09/27 0700 - 09/28 0659 09/28 0700 - 09/29 0659 09/29 0700 - 09/30 0659    P.O. 600 4820     I.V. (mL/kg) 1310.4 (15.4) 3710 (43.7)     IV Piggyback 400 100     Total Intake(mL/kg) 2310.4 (27.2) 8630 (101.6)     Urine (mL/kg/hr) 1400 (0.7) 3950 (1.9)     Other       Total Output 1400 3950      Net +910.4 +4680                       Laboratory:  BMP:     Recent Labs  Lab 09/29/17  0415   GLU 84      K 3.8      CO2 17*   BUN 18   CREATININE 3.4*   CALCIUM 8.5*     CBC:     Recent Labs  Lab 09/28/17  0842   WBC 10.61   RBC 2.85*   HGB 8.7*   HCT 25.7*      MCV 90   MCH 30.5   MCHC 33.9       Diagnostic Results:  None    Physical Exam:   Constitutional: She is oriented to person, place, and time. She appears well-developed and well-nourished.    HENT:   Head: Normocephalic and atraumatic.    Eyes: EOM are normal.    Neck: Normal range of motion.    Cardiovascular: Normal rate, regular rhythm, normal heart sounds and intact distal pulses.     Pulmonary/Chest: Effort normal and breath sounds normal.        Abdominal: Soft. She exhibits abdominal incision. There is tenderness (TTP of incisional area). There is no rebound and no guarding.   Normoactive BS.  Incisions c/d/i. No signs of active bleeding.  No signs of infection.  Mildly distended this AM             Musculoskeletal: Normal range of motion.       Neurological: She is alert and oriented to person, place, and time. She has normal reflexes.    Skin: Skin is warm and dry.    Psychiatric: She has a normal mood and affect. Her behavior is normal. Thought content normal.     Assessment/Plan:     * S/P LINSEY (total abdominal hysterectomy)    -continue routine advances  -percocet PRN.  Dilaudid PRN BTP  -regular diet  -Zofran available PRN nausea  -CBC: 9.15/8.1/23.8/189 on 9/27  Stable. S/p 2u PRBC  -Adequate UOP overnight.    -Encourage ambulation  -Passing flatus.         SUKUMAR (acute kidney injury)    - Cr 0.5>1.2>3.0>3.4  - Abx and nephrotoxic medication stopped  - Will get BMP in AM.   - Pt to stay another night to assess Cr in AM  - [facility] overnight: 3950cc/24 hours        Fever and chills    - Tlast/max 102.5 @1345 on 9/26  - WBC 27 on admit.  27>31.8>9.3  - BCx at [facility] showed Peptostreptoccus anaid. Beta lactamase negative.   - Afebrile for over 48 hours.  Will stop all abx at this time  - Will monitor for fevers.        Placenta accreta    - patient in severe pain, actively trying to deliver placenta on admit  - Brought to OR by Gyn onc for LINSEY  - consents for LINSEY/BS and blood discussed, signed and to chart  - likely source of infection                Julia Boyce MD  Obstetrics & Gynecology  [facility]

## 2017-09-30 VITALS
OXYGEN SATURATION: 99 % | RESPIRATION RATE: 16 BRPM | TEMPERATURE: 97 F | BODY MASS INDEX: 28.4 KG/M2 | DIASTOLIC BLOOD PRESSURE: 92 MMHG | SYSTOLIC BLOOD PRESSURE: 144 MMHG | WEIGHT: 187.38 LBS | HEART RATE: 64 BPM | HEIGHT: 68 IN

## 2017-09-30 DIAGNOSIS — N17.9 AKI (ACUTE KIDNEY INJURY): Primary | ICD-10-CM

## 2017-09-30 LAB
ANION GAP SERPL CALC-SCNC: 11 MMOL/L
ANION GAP SERPL CALC-SCNC: 12 MMOL/L
BUN SERPL-MCNC: 19 MG/DL
BUN SERPL-MCNC: 19 MG/DL
CALCIUM SERPL-MCNC: 8.8 MG/DL
CALCIUM SERPL-MCNC: 9.1 MG/DL
CHLORIDE SERPL-SCNC: 108 MMOL/L
CHLORIDE SERPL-SCNC: 111 MMOL/L
CO2 SERPL-SCNC: 19 MMOL/L
CO2 SERPL-SCNC: 21 MMOL/L
CREAT SERPL-MCNC: 3.3 MG/DL
CREAT SERPL-MCNC: 3.4 MG/DL
EST. GFR  (AFRICAN AMERICAN): 20 ML/MIN/1.73 M^2
EST. GFR  (AFRICAN AMERICAN): 20 ML/MIN/1.73 M^2
EST. GFR  (NON AFRICAN AMERICAN): 17 ML/MIN/1.73 M^2
EST. GFR  (NON AFRICAN AMERICAN): 18 ML/MIN/1.73 M^2
GLUCOSE SERPL-MCNC: 79 MG/DL
GLUCOSE SERPL-MCNC: 85 MG/DL
POTASSIUM SERPL-SCNC: 4 MMOL/L
POTASSIUM SERPL-SCNC: 4 MMOL/L
SODIUM SERPL-SCNC: 140 MMOL/L
SODIUM SERPL-SCNC: 142 MMOL/L

## 2017-09-30 PROCEDURE — 80048 BASIC METABOLIC PNL TOTAL CA: CPT

## 2017-09-30 PROCEDURE — 25000003 PHARM REV CODE 250: Performed by: OBSTETRICS & GYNECOLOGY

## 2017-09-30 PROCEDURE — 80048 BASIC METABOLIC PNL TOTAL CA: CPT | Mod: 91

## 2017-09-30 PROCEDURE — 36415 COLL VENOUS BLD VENIPUNCTURE: CPT

## 2017-09-30 PROCEDURE — 94761 N-INVAS EAR/PLS OXIMETRY MLT: CPT

## 2017-09-30 RX ADMIN — OXYCODONE HYDROCHLORIDE AND ACETAMINOPHEN 1 TABLET: 10; 325 TABLET ORAL at 11:09

## 2017-09-30 RX ADMIN — OXYCODONE HYDROCHLORIDE AND ACETAMINOPHEN 1 TABLET: 10; 325 TABLET ORAL at 06:09

## 2017-09-30 RX ADMIN — OXYCODONE HYDROCHLORIDE AND ACETAMINOPHEN 1 TABLET: 5; 325 TABLET ORAL at 04:09

## 2017-09-30 NOTE — PLAN OF CARE
Problem: Patient Care Overview  Goal: Plan of Care Review  Outcome: Ongoing (interventions implemented as appropriate)  Patient rested comfortably overnight, no significant events, pain controlled with meds. VSS. All questions and concerns answered at this time. Advised patient to call RN if needed. Will continue to monitor.

## 2017-09-30 NOTE — ASSESSMENT & PLAN NOTE
-continue routine advances  -percocet PRN.  Dilaudid PRN BTP  -regular diet  -Zofran available PRN nausea  -CBC: 9.15/8.1/23.8/189 on 9/27  Stable. S/p 2u PRBC  -Encourage ambulation  -Passing flatus.

## 2017-09-30 NOTE — SUBJECTIVE & OBJECTIVE
Interval History:  Pt Afebrile overnight.  Denies any fever/chills overnight. Tolerating regular diet without diffiulty. Denies nausea/vomiting at this time.  Pain controlled with PO pain medication.  Pt is passing flatus.  No BMs.  Ambulating to the bathroom without difficult.  Urinating well. Pt denies fever/chills.       Scheduled Meds:   Continuous Infusions:   dextrose 5 % and 0.45 % NaCl with KCl 20 mEq 125 mL/hr at 09/26/17 0344    hydromorphone in 0.9 % NaCl 6 mg/30 ml      lactated ringers 125 mL/hr at 09/25/17 0531     PRN Meds:naloxone, ondansetron, promethazine (PHENERGAN) IVPB, sodium chloride 0.9%    Review of patient's allergies indicates:  No Known Allergies    Objective:     Vital Signs (Most Recent):  Temp: (!) 100.9 °F (38.3 °C) (09/26/17 0533)  Pulse: (!) 118 (09/26/17 0600)  Resp: 16 (09/26/17 0600)  BP: 126/69 (09/25/17 2000)  SpO2: 100 % (09/26/17 0600) Vital Signs (24h Range):  Temp:  [98.4 °F (36.9 °C)-101 °F (38.3 °C)] 100.9 °F (38.3 °C)  Pulse:  [] 118  Resp:  [10-18] 16  SpO2:  [94 %-100 %] 100 %  BP: ()/(53-74) 126/69  Arterial Line BP: (102-160)/(50-70) 140/66     Weight: 84.9 kg (187 lb 2.7 oz)  Body mass index is 28.46 kg/m².  Patient's last menstrual period was 11/11/2013.    I&O (Last 24H):      Intake/Output - Last 3 Shifts       09/28 0700 - 09/29 0659 09/29 0700 - 09/30 0659 09/30 0700 - 10/01 0659    P.O. 4820      I.V. (mL/kg) 3710 (43.7)      IV Piggyback 100      Total Intake(mL/kg) 8630 (101.6)      Urine (mL/kg/hr) 3950 (1.9)      Total Output 3950        Net +4680                         Laboratory:  BMP:     Recent Labs  Lab 09/30/17  0545   GLU 85      K 4.0   *   CO2 19*   BUN 19   CREATININE 3.4*   CALCIUM 8.8     CBC:   No results for input(s): WBC, RBC, HGB, HCT, PLT, MCV, MCH, MCHC in the last 48 hours.    Diagnostic Results:  None    Physical Exam:   Constitutional: She is oriented to person, place, and time. She appears well-developed  and well-nourished.    HENT:   Head: Normocephalic and atraumatic.    Eyes: EOM are normal.    Neck: Normal range of motion.    Cardiovascular: Normal rate, regular rhythm, normal heart sounds and intact distal pulses.     Pulmonary/Chest: Effort normal and breath sounds normal.        Abdominal: Soft. She exhibits abdominal incision. There is tenderness (TTP of incisional area). There is no rebound and no guarding.   Normoactive BS.  Incisions c/d/i. No signs of active bleeding.  No signs of infection.             Musculoskeletal: Normal range of motion.       Neurological: She is alert and oriented to person, place, and time. She has normal reflexes.    Skin: Skin is warm and dry.    Psychiatric: She has a normal mood and affect. Her behavior is normal. Thought content normal.

## 2017-09-30 NOTE — DISCHARGE INSTRUCTIONS
Call clinic [phone] or L & D after hours at [phone] for vaginal bleeding, worsening pain, headache not relieved by Tylenol, blurry vision, or temp of 100.4 or greater and signs of infection.

## 2017-09-30 NOTE — ASSESSMENT & PLAN NOTE
- Cr 0.5>1.2>3.0>3.4>3.4 today (stable)  - Abx and nephrotoxic medication stopped  - Will reassess Cr this afternoon and possibly discharge  - Patient reports good urine output, however nursing has not logged, will follow-up today

## 2017-09-30 NOTE — PLAN OF CARE
Problem: Patient Care Overview  Goal: Plan of Care Review  Pt VSS as charted. Pt reported mild pain, relieved with PRN medication. Denies vaginal bleeding or discharge. Pt for discharge following lab results as per Dr. Akhtar's order. AVS reviewed with patient. Discharge instructions and education given. Pt verbalised understanding. Pt waiting in room for family to provide transport home.

## 2017-09-30 NOTE — PROGRESS NOTES
[facility]  Obstetrics & Gynecology  Progress Note    Patient Name: Louisa Morrow  MRN: [id number]  Admission Date: 2017  Primary Care Provider: Waylon Chery MD  Principal Problem: S/P LINSEY (total abdominal hysterectomy)    Subjective:     HPI:  31 yo  with h/o placenta accreta now POD#12 s/p classical  and UAE, placenta in situ, who presents as a transport from [facility] for acute onset abdominal cramping since 8 pm and heavy VB since 1030 pm. Describes pain as intense cramping that comes in waves every 3-4 minutes like labor contractions. Has saturated 3-4 pad/towels since bleeding onset. Denies fever, chills. +N/V x1 with intense pain. Otherwise tolerating PO, normal bladder and bowel function.     Interval History:  Pt Afebrile overnight.  Denies any fever/chills overnight. Tolerating regular diet without diffiulty. Denies nausea/vomiting at this time.  Pain controlled with PO pain medication.  Pt is passing flatus.  No BMs.  Ambulating to the bathroom without difficult.  Urinating well. Pt denies fever/chills.       Scheduled Meds:   Continuous Infusions:   dextrose 5 % and 0.45 % NaCl with KCl 20 mEq 125 mL/hr at 17 0344    hydromorphone in 0.9 % NaCl 6 mg/30 ml      lactated ringers 125 mL/hr at 17 0531     PRN Meds:naloxone, ondansetron, promethazine (PHENERGAN) IVPB, sodium chloride 0.9%    Review of patient's allergies indicates:  No Known Allergies    Objective:     Vital Signs (Most Recent):  Temp: (!) 100.9 °F (38.3 °C) (17 0533)  Pulse: (!) 118 (17 06)  Resp: 16 (17)  BP: 126/69 (17)  SpO2: 100 % (17) Vital Signs (24h Range):  Temp:  [98.4 °F (36.9 °C)-101 °F (38.3 °C)] 100.9 °F (38.3 °C)  Pulse:  [] 118  Resp:  [10-18] 16  SpO2:  [94 %-100 %] 100 %  BP: ()/(53-74) 126/69  Arterial Line BP: (102-160)/(50-70) 140/66     Weight: 84.9 kg (187 lb 2.7 oz)  Body mass index is  28.46 kg/m².  Patient's last menstrual period was 11/11/2013.    I&O (Last 24H):      Intake/Output - Last 3 Shifts       09/28 0700 - 09/29 0659 09/29 0700 - 09/30 0659 09/30 0700 - 10/01 0659    P.O. 4820      I.V. (mL/kg) 3710 (43.7)      IV Piggyback 100      Total Intake(mL/kg) 8630 (101.6)      Urine (mL/kg/hr) 3950 (1.9)      Total Output 3950        Net +4680                         Laboratory:  BMP:     Recent Labs  Lab 09/30/17  0545   GLU 85      K 4.0   *   CO2 19*   BUN 19   CREATININE 3.4*   CALCIUM 8.8     CBC:   No results for input(s): WBC, RBC, HGB, HCT, PLT, MCV, MCH, MCHC in the last 48 hours.    Diagnostic Results:  None    Physical Exam:   Constitutional: She is oriented to person, place, and time. She appears well-developed and well-nourished.    HENT:   Head: Normocephalic and atraumatic.    Eyes: EOM are normal.    Neck: Normal range of motion.    Cardiovascular: Normal rate, regular rhythm, normal heart sounds and intact distal pulses.     Pulmonary/Chest: Effort normal and breath sounds normal.        Abdominal: Soft. She exhibits abdominal incision. There is tenderness (TTP of incisional area). There is no rebound and no guarding.   Normoactive BS.  Incisions c/d/i. No signs of active bleeding.  No signs of infection.             Musculoskeletal: Normal range of motion.       Neurological: She is alert and oriented to person, place, and time. She has normal reflexes.    Skin: Skin is warm and dry.    Psychiatric: She has a normal mood and affect. Her behavior is normal. Thought content normal.       Assessment/Plan:     * S/P LINSEY (total abdominal hysterectomy)    -continue routine advances  -percocet PRN.  Dilaudid PRN BTP  -regular diet  -Zofran available PRN nausea  -CBC: 9.15/8.1/23.8/189 on 9/27  Stable. S/p 2u PRBC  -Encourage ambulation  -Passing flatus.         SUKUMAR (acute kidney injury)    - Cr 0.5>1.2>3.0>3.4>3.4 today (stable)  - Abx and nephrotoxic medication  stopped  - Will reassess Cr this afternoon and possibly discharge  - Patient reports good urine output, however nursing has not logged, will follow-up today        Fever and chills    - Tlast/max 102.5 @1345 on 9/26  - WBC 27 on admit.  27>31.8>9.3  - BCx at [facility] showed Peptostreptoccus anaid. Beta lactamase negative.   - Afebrile for over 48 hours.  Will stop all abx at this time  - Will monitor for fevers.        Placenta accreta    - patient in severe pain, actively trying to deliver placenta on admit  - Brought to OR by Gyn onc for LINSEY  - consents for LINSEY/BS and blood discussed, signed and to chart  - likely source of infection                JERONIMO Martin MD  Obstetrics & Gynecology  [facility]

## 2017-09-30 NOTE — DISCHARGE SUMMARY
[facility]  Obstetrics & Gynecology  Discharge Summary    Patient Name: Louisa Morrow  MRN: [id number]  Admission Date: 2017  Hospital Length of Stay: 5 days  Discharge Date and Time:  2017 6:30 PM  Attending Physician: Garret Small MD   Discharging Provider: Magda Akhtar MD  Primary Care Provider: Waylon Chery MD    HPI:  31 yo  with h/o placenta accreta now POD#12 s/p classical  and UAE, placenta in situ, who presents as a transport from [facility] for acute onset abdominal cramping since 8 pm and heavy VB since 1030 pm. Describes pain as intense cramping that comes in waves every 3-4 minutes like labor contractions. Has saturated 3-4 pad/towels since bleeding onset. Denies fever, chills. +N/V x1 with intense pain. Otherwise tolerating PO, normal bladder and bowel function.     Hospital Course:  2017: Pt admitted and taken to the OR for ex-lap/LINSEY.   2017: Fevers overnight.  Able to tolerate sips of liquids  2017: Afebrile overnight. Tolerating PO.  Transitioned to PO pain medication.   2017: Afebrile overnight.  Tolerating PO.  Pain controlled with PO pain medication.  Cr noted to increase to 3.0. BCx shows peptostreptococcus anaid. Vancomycin stopped.  Ibuprofen stopped and zosyn switched to renal dosing  2017: Afebrile overnight.   Pt over 48 hours afebrile.  Abx stopped. Cr increased to 3.4  Will keep overnight to assess Cr in AM  : Afebrile overnight. SUKUMAR stable, Cr @3.4, repeat Cr Repeat CMP showed Cr stable at 3.3. Patient stable for discharge.     Procedure(s) (LRB):  HYSTERECTOMY-ABDOMINAL-TOTAL (LINSEY) (N/A)         Significant Diagnostic Studies: Labs:   CMP   Recent Labs  Lab 17  0415 17  0545 17  1600    142 140   K 3.8 4.0 4.0    111* 108   CO2 17* 19* 21*   GLU 84 85 79   BUN 18 19 19   CREATININE 3.4* 3.4* 3.3*   CALCIUM 8.5* 8.8 9.1   ANIONGAP 12 12 11   ESTGFRAFRICA  20* 20* 20*   EGFRNONAA 17* 17* 18*    and CBC No results for input(s): WBC, HGB, HCT, PLT in the last 48 hours.    Pending Diagnostic Studies:     None        Final Active Diagnoses:    Diagnosis Date Noted POA    PRINCIPAL PROBLEM:  S/P LINSEY (total abdominal hysterectomy) [Z90.710] 09/25/2017 No    SUKUMAR (acute kidney injury) [N17.9] 09/29/2017 No    Fever and chills [R50.9] 09/26/2017 No    Placenta accreta [O43.219] 09/13/2017 Yes      Problems Resolved During this Admission:    Diagnosis Date Noted Date Resolved POA        Discharged Condition: good    Disposition: Home or Self Care    Follow Up:  Follow-up Information     Schedule an appointment as soon as possible for a visit with Garret Small MD.    Specialties:  Obstetrics, Gynecology, Gynecologic Oncology  Why:  post op visit  Contact information:  [address]  [facility] [address]  [phone]                 Patient Instructions:     Diet general     Activity as tolerated     Lifting restrictions   Order Comments: No more than 10lbs     Other restrictions (specify):   Order Comments: Pelvic rest for 12 weeks     Call MD for:  temperature >100.4     Call MD for:  persistent nausea and vomiting or diarrhea     Call MD for:  severe uncontrolled pain     Call MD for:  redness, tenderness, or signs of infection (pain, swelling, redness, odor or green/yellow discharge around incision site)     Call MD for:  difficulty breathing or increased cough     Call MD for:  severe persistent headache     Call MD for:  persistent dizziness, light-headedness, or visual disturbances     Call MD for:  increased confusion or weakness     Call MD for:   Order Comments: Vaginal bleeding through more than 2pads in 1 hour       Medications:  Reconciled Home Medications:   Current Discharge Medication List      CONTINUE these medications which have CHANGED    Details   hydrocodone-acetaminophen 5-325mg (NORCO) 5-325 mg per tablet Take 1 tablet by mouth every 4 (four)  hours as needed.  Qty: 30 tablet, Refills: 0         CONTINUE these medications which have NOT CHANGED    Details   PNV,CALCIUM 72/IRON/FOLIC ACID (PRENATAL VITAMIN) Tab Take 1 tablet by mouth once daily.      promethazine (PHENERGAN) 25 MG tablet Take 25 mg by mouth every 4 (four) hours.         STOP taking these medications       ibuprofen (ADVIL,MOTRIN) 600 MG tablet Comments:   Reason for Stopping:               Magda Akhtar MD  Obstetrics & Gynecology  [facility]

## 2017-10-02 ENCOUNTER — TELEPHONE (OUTPATIENT)
Dept: GYNECOLOGIC ONCOLOGY | Facility: CLINIC | Age: 32
End: 2017-10-02

## 2017-10-02 ENCOUNTER — LAB VISIT (OUTPATIENT)
Dept: LAB | Facility: HOSPITAL | Age: 32
End: 2017-10-02
Attending: OBSTETRICS & GYNECOLOGY
Payer: MEDICAID

## 2017-10-02 DIAGNOSIS — R11.15 NON-INTRACTABLE CYCLICAL VOMITING WITH NAUSEA: Primary | ICD-10-CM

## 2017-10-02 DIAGNOSIS — N17.9 AKI (ACUTE KIDNEY INJURY): ICD-10-CM

## 2017-10-02 DIAGNOSIS — N17.9 AKI (ACUTE KIDNEY INJURY): Primary | ICD-10-CM

## 2017-10-02 LAB
ANION GAP SERPL CALC-SCNC: 13 MMOL/L
BUN SERPL-MCNC: 15 MG/DL
CALCIUM SERPL-MCNC: 9.1 MG/DL
CHLORIDE SERPL-SCNC: 108 MMOL/L
CO2 SERPL-SCNC: 21 MMOL/L
CREAT SERPL-MCNC: 2.3 MG/DL
EST. GFR  (AFRICAN AMERICAN): 31 ML/MIN/1.73 M^2
EST. GFR  (NON AFRICAN AMERICAN): 27 ML/MIN/1.73 M^2
GLUCOSE SERPL-MCNC: 109 MG/DL
POTASSIUM SERPL-SCNC: 3.8 MMOL/L
SODIUM SERPL-SCNC: 142 MMOL/L

## 2017-10-02 PROCEDURE — 80048 BASIC METABOLIC PNL TOTAL CA: CPT

## 2017-10-02 PROCEDURE — 36415 COLL VENOUS BLD VENIPUNCTURE: CPT

## 2017-10-02 RX ORDER — PROMETHAZINE HYDROCHLORIDE 25 MG/1
25 TABLET ORAL EVERY 6 HOURS PRN
Qty: 20 TABLET | Refills: 1 | Status: SHIPPED | OUTPATIENT
Start: 2017-10-02 | End: 2019-02-19

## 2017-10-16 ENCOUNTER — LAB VISIT (OUTPATIENT)
Dept: LAB | Facility: OTHER | Age: 32
End: 2017-10-16
Attending: OBSTETRICS & GYNECOLOGY
Payer: MEDICAID

## 2017-10-16 ENCOUNTER — OFFICE VISIT (OUTPATIENT)
Dept: GYNECOLOGIC ONCOLOGY | Facility: CLINIC | Age: 32
End: 2017-10-16
Payer: MEDICAID

## 2017-10-16 VITALS
HEART RATE: 81 BPM | SYSTOLIC BLOOD PRESSURE: 143 MMHG | BODY MASS INDEX: 26.38 KG/M2 | WEIGHT: 173.5 LBS | DIASTOLIC BLOOD PRESSURE: 99 MMHG | RESPIRATION RATE: 16 BRPM

## 2017-10-16 DIAGNOSIS — Z90.710 S/P TAH (TOTAL ABDOMINAL HYSTERECTOMY): ICD-10-CM

## 2017-10-16 DIAGNOSIS — O43.213 PLACENTA ACCRETA IN THIRD TRIMESTER: Primary | ICD-10-CM

## 2017-10-16 LAB
ANION GAP SERPL CALC-SCNC: 9 MMOL/L
BUN SERPL-MCNC: 12 MG/DL
CALCIUM SERPL-MCNC: 9.4 MG/DL
CHLORIDE SERPL-SCNC: 105 MMOL/L
CO2 SERPL-SCNC: 26 MMOL/L
CREAT SERPL-MCNC: 0.9 MG/DL
EST. GFR  (AFRICAN AMERICAN): >60 ML/MIN/1.73 M^2
EST. GFR  (NON AFRICAN AMERICAN): >60 ML/MIN/1.73 M^2
GLUCOSE SERPL-MCNC: 94 MG/DL
POTASSIUM SERPL-SCNC: 4.2 MMOL/L
SODIUM SERPL-SCNC: 140 MMOL/L

## 2017-10-16 PROCEDURE — 36415 COLL VENOUS BLD VENIPUNCTURE: CPT

## 2017-10-16 PROCEDURE — 80048 BASIC METABOLIC PNL TOTAL CA: CPT

## 2017-10-16 PROCEDURE — 99999 PR PBB SHADOW E&M-EST. PATIENT-LVL II: CPT | Mod: PBBFAC,,, | Performed by: OBSTETRICS & GYNECOLOGY

## 2017-10-16 PROCEDURE — 99024 POSTOP FOLLOW-UP VISIT: CPT | Mod: S$PBB,,, | Performed by: OBSTETRICS & GYNECOLOGY

## 2017-10-16 PROCEDURE — 99212 OFFICE O/P EST SF 10 MIN: CPT | Mod: PBBFAC | Performed by: OBSTETRICS & GYNECOLOGY

## 2017-10-16 NOTE — PROGRESS NOTES
Subjective:      Patient ID: Louisa Morrow is a 32 y.o. female.    Chief Complaint: Follow-up      HPI  Here today for post-op check after LINSEY for increta on 9/25/17.  Had PERFECTO post-op probably related to abx toxicity.  Overall doing well now. No problems per patient.  Review of Systems   Constitutional: Negative for activity change, appetite change, chills, fatigue and fever.   HENT: Negative for hearing loss, mouth sores, nosebleeds, sore throat and tinnitus.    Eyes: Negative for visual disturbance.   Respiratory: Negative for cough, chest tightness, shortness of breath and wheezing.    Cardiovascular: Negative for chest pain and leg swelling.   Gastrointestinal: Negative for abdominal distention, abdominal pain, blood in stool, constipation, diarrhea, nausea and vomiting.   Genitourinary: Negative for dysuria, flank pain, frequency and hematuria.   Musculoskeletal: Negative for arthralgias and back pain.   Skin: Negative for rash.   Neurological: Negative for dizziness, seizures, syncope, weakness and numbness.   Hematological: Does not bruise/bleed easily.   Psychiatric/Behavioral: Negative for confusion and sleep disturbance. The patient is not nervous/anxious.         Objective:   Physical Exam:   Constitutional: She is oriented to person, place, and time. She appears well-developed and well-nourished. No distress.    HENT:   Head: Normocephalic and atraumatic.    Eyes: No scleral icterus.    Neck: Normal range of motion. Neck supple.    Cardiovascular: Normal rate and intact distal pulses.  Exam reveals no cyanosis and no edema.     Pulmonary/Chest: Effort normal. No respiratory distress. She exhibits no tenderness.        Abdominal: Soft. She exhibits no distension (incision healing well), no fluid wave, no ascites and no mass. There is no tenderness. There is no rebound and no guarding. No hernia.     Genitourinary: Rectum normal and vagina normal. Pelvic exam was performed with patient supine.  There is no rash or lesion on the right labia. There is no rash or lesion on the left labia. Uterus is absent. Right adnexum displays no mass, no tenderness and no fullness. Left adnexum displays no mass, no tenderness and no fullness. No tenderness, bleeding (cuff healing well) or unspecified prolapse of vaginal walls in the vagina. No vaginal discharge found. Vaginal cuff normal.Labial bartholins normal.Cervix exhibits absence.           Musculoskeletal: Normal range of motion and moves all extremeties. She exhibits no edema.      Lymphadenopathy:     She has no cervical adenopathy.        Right: No inguinal adenopathy present.        Left: No inguinal adenopathy present.    Neurological: She is alert and oriented to person, place, and time.    Skin: Skin is warm. No rash noted. No cyanosis or erythema.    Psychiatric: She has a normal mood and affect. Thought content normal.       Assessment:     1. Placenta accreta in third trimester    2. S/P LINSEY (total abdominal hysterectomy)        Plan:       Doing well post-op.  Recheck BMP to ensure renal function back to baseline.  RTC prn.

## 2017-10-16 NOTE — PHYSICIAN QUERY
"PT Name: Louisa Morrow  MR #: [id number]    Physician Query Form - Hematology Clarification      CDS/: Eden Hwang RN  CCDS               Contact information: [email or web address]    This form is a permanent document in the medical record.      Query Date: 2017    By submitting this query, we are merely seeking further clarification of documentation. Please utilize your independent clinical judgment when addressing the question(s) below.    The Medical record contains the following:   Indicators  Supporting Clinical Findings Location in Medical Record   X "Anemia" documented  Vaginal bleeding and cramping.  Anemia.     OP note    X H & H =  H/H 8.1/23.8  Labs     BP =                     HR=      "GI bleeding" documented     X Acute bleeding (Non GI site)  31 yo  with h/o placenta accreta now POD#12 s/p classical  and UAE, placenta in situ, who presents as a transport from [facility] for acute onset abdominal cramping since 8 pm and heavy VB since 1030 pm. Has saturated 3-4 pad/towels since bleeding onset.  H&P   X Transfusion(s)  ESTIMATED BLOOD LOSS: 700 mL with 2 units packed red blood cells replaced  OP note     Treatment:     X Other:   Placenta accreta     H&P     Provider, please specify diagnosis or diagnoses associated with above clinical findings.    [ x ] Acute blood loss anemia expected post-operatively    [  ] Acute blood loss anemia    [  ] Other Hematological Diagnosis (please specify): _________________________________    [  ] Clinically Undetermined       Please document in your progress notes daily for the duration of treatment, until resolved, and include in your discharge summary.                                                                                                      "

## 2017-10-17 ENCOUNTER — PATIENT MESSAGE (OUTPATIENT)
Dept: GYNECOLOGIC ONCOLOGY | Facility: CLINIC | Age: 32
End: 2017-10-17

## 2017-10-17 ENCOUNTER — TELEPHONE (OUTPATIENT)
Dept: GYNECOLOGIC ONCOLOGY | Facility: CLINIC | Age: 32
End: 2017-10-17

## 2017-10-17 NOTE — TELEPHONE ENCOUNTER
----- Message from Garret Small MD sent at 10/17/2017 12:33 PM CDT -----  Please let her know normal

## 2017-10-24 NOTE — OP NOTE
DATE OF PROCEDURE:  2017.    This is being dictated as a cosurgeon with Dr. Ismael Edwards.    PREOPERATIVE DIAGNOSES:  1.  Intrauterine pregnancy at approximately 34 weeks.  2.  Known placenta previa with suspected placenta accreta versus increta.    POSTOPERATIVE DIAGNOSES:  Placenta increta.    PROCEDURE:  Ex-lap and evaluation of the lower uterine segment with decision to   leave the placenta in situ.    COMPLICATIONS:  None.    ESTIMATED BLOOD LOSS:  For my portion of the procedure, none.    ANESTHESIA:  Spinal.    INTRAOPERATIVE FINDINGS:  Included enlarged lower uterine segment with what   appeared to be anterior bladder involvement.  The patient did have some adhesive   disease related to her prior  section and so it was difficult to   differentiate placental involvement versus previous surgical scarring.  There   appeared to be no parametrial or circumferential extension of the placenta.    PROCEDURE IN DETAIL:  Informed consent was obtained, the patient was taken to   the Operating Suite.  Dr. Edwards will dictate opening of the abdomen as well as   the delivery.  Once the hysterotomy was closed, I scrubbed in and evaluated the   uterus.  The above stated findings were noted.  Given the possible bladder   involvement, we elected to leave the placenta in situ with plans to go to the   Interventional Radiology suite for embolization after closure of the abdomen.  I   inspected the posterior uterus, cul-de-sac, upper abdomen, as well as lower   uterine segment.  Both tubes and ovaries appeared normal.  Once this was done   and hemostasis was ensured, the vagina was checked and there was no active   bleeding; therefore, we elected to leave the placenta in place.  At this point   in time, Dr. Edwards will dictate closure of the abdomen, and the skin.  Of note,   I was present for and performed all key aspects of the procedure.      ROBINSON/IN  dd: 10/23/2017 10:05:20 (CDT)  td: 10/23/2017 13:50:47 (CDT)   Doc ID   #[id number]  Job ID #[id number]    CC:

## 2017-11-30 ENCOUNTER — TELEPHONE (OUTPATIENT)
Dept: GYNECOLOGIC ONCOLOGY | Facility: CLINIC | Age: 32
End: 2017-11-30

## 2017-11-30 NOTE — TELEPHONE ENCOUNTER
"----- Message from Anu Giraldo sent at 11/30/2017  1:25 PM CST -----  Louisa Odalys said for last 3 days she has been experiencing stomach pain "burning sensation" /pt can be reached at        [facility]# [id number]  "

## 2017-11-30 NOTE — TELEPHONE ENCOUNTER
11/30/17 rec'd pc from pt with c/o burning and pain around belly button 3 -4 inches away from surg incision. Pt denies any drainage or fever. Advised pt I will speak With Dr. Small on tomorrow as He is in surg today. TA/MA

## 2017-12-01 ENCOUNTER — TELEPHONE (OUTPATIENT)
Dept: GYNECOLOGIC ONCOLOGY | Facility: CLINIC | Age: 32
End: 2017-12-01

## 2017-12-01 ENCOUNTER — PATIENT MESSAGE (OUTPATIENT)
Dept: GYNECOLOGIC ONCOLOGY | Facility: CLINIC | Age: 32
End: 2017-12-01

## 2017-12-01 NOTE — TELEPHONE ENCOUNTER
"  Attempted to return call and no answer or voicemail.    ----- Message from Chadwick Parson MA sent at 12/1/2017 10:02 AM CST -----  rec'd pc from pt with c/o burning and pain around belly button 3 -4 inches away from surg incision. Pt denies any other sx. Please advise  ----- Message -----  From: Anu Giraldo  Sent: 11/30/2017   1:25 PM  To: Geovanny Combs Staff    Louisa Morrow said for last 3 days she has been experiencing stomach pain "burning sensation" /pt can be reached at        [facility]# [id number]      "

## 2017-12-01 NOTE — TELEPHONE ENCOUNTER
"----- Message from Umm Aburto sent at 12/1/2017  2:27 PM CST -----  Contact: Patient herself  X 1st Request  _  2nd Request  _  3rd Request    Who: Louisa Morrow (mrn# [id number])    Why: Patient called and said, "she's returning your call and would like you to please call again.   THANKS!    What Number to Call Back:  [phone]    When to Expect a call back: (Before the end of the day)   -- if the call is after 12:00, the call back will be tomorrow.  _  1st Request  _  2nd Request  _  3rd Request        Who:     Why:     What Number to Call Back:    When to Expect a call back: (Before the end of the day)   -- if the call is after 12:00, the call back will be tomorrow.                                                "

## 2017-12-04 ENCOUNTER — TELEPHONE (OUTPATIENT)
Dept: GYNECOLOGIC ONCOLOGY | Facility: CLINIC | Age: 32
End: 2017-12-04

## 2017-12-04 NOTE — TELEPHONE ENCOUNTER
"Attempted to return call. No answer or voicemail.    ----- Message from Chadwick Parson MA sent at 12/1/2017  4:27 PM CST -----  Contact: Patient herself      ----- Message -----  From: Umm Aburto  Sent: 12/1/2017   2:27 PM  To: Geovanny Combs Staff    X 1st Request  _  2nd Request  _  3rd Request    Who: Louisa Morrow (mrn# [id number])    Why: Patient called and said, "she's returning your call and would like you to please call again.   THANKS!    What Number to Call Back:  [phone]    When to Expect a call back: (Before the end of the day)   -- if the call is after 12:00, the call back will be tomorrow.  _  1st Request  _  2nd Request  _  3rd Request        Who:     Why:     What Number to Call Back:    When to Expect a call back: (Before the end of the day)   -- if the call is after 12:00, the call back will be tomorrow.                                                  "

## 2018-05-30 NOTE — PLAN OF CARE
89y Male nursing home resident, unclear hx of fall, arrived to ED with xrays from nursing home positive for Humeral shaft fx.  Unclear hand dominance. Patient is demented at baseline, non verbal, unable to obtain proper ROS and Hx.    PAST MEDICAL & SURGICAL HISTORY Taken from chart:  Multiple myeloma, failed remission  Essential hypertension  Melanoma  No significant past surgical history    MEDICATIONS  (STANDING):    Allergies    No Known Allergies    Intolerances                              8.4    6.55  )-----------( 173      ( 30 May 2018 06:01 )             25.4     30 May 2018 06:01    129    |  96     |  31     ----------------------------<  187    5.3     |  25     |  1.14     Ca    9.7        30 May 2018 06:01    TPro  10.6   /  Alb  1.9    /  TBili  0.4    /  DBili  x      /  AST  27     /  ALT  30     /  AlkPhos  73     30 May 2018 06:01    PT/INR - ( 30 May 2018 06:01 )   PT: 12.0 sec;   INR: 1.10 ratio         PTT - ( 30 May 2018 06:01 )  PTT:21.4 sec  Vital Signs Last 24 Hrs  T(C): 36.3 (05-30-18 @ 07:54), Max: 36.8 (05-30-18 @ 04:32)  T(F): 97.3 (05-30-18 @ 07:54), Max: 98.3 (05-30-18 @ 04:32)  HR: 78 (05-30-18 @ 07:54) (78 - 82)  BP: 159/100 (05-30-18 @ 07:54) (154/92 - 159/100)  RR: 16 (05-30-18 @ 07:54) (16 - 17)  SpO2: 100% (05-30-18 @ 07:54) (99% - 100%)    Imaging: XR demonstrates L  Humeral shaft Fx with lytic lesions in LUE      Gen: NAD, AAOx3    LUE: Skin intact, small deformity visualized in humerus, no signs of tenting of skin, mild swelling around humerus, no bony TTP at Shoulder/elbow/wrist/Hand/Fingers with visualization of facial discomfort, patient able to flex and extend fingers grossly, unable to obtain proper Neuro exam 2/2 baseline dementia, unable to assess sensation 2/2 baseline dementia, responds to tactile stimuli, Radial Pulse, compartments soft, hand is pink and warm    Secondary Survey: Patient unable to follow commands for exam, mild TTP with palpation of left ankle, small mass on top of head non TTP, unclear if chronic, likely not from fall, Full PROM of unaffected extremities, unable to SLR B/L but minimal pain with Passive Hip ROM, compartments soft, no bony TTP over bony prominences, no calf TTP, no TTP along axial spine.      A/P: 89y Male with Hx of multiple myeloma present with L Humeral Shaft Fx  - Unclear hx of fall as patient is demented, patient hx of multiple myeloma with signs of lytic lesion in LUE  - FU Ankle Xrays  -pain control  -in Coaptation splint 90deg flexion  -NWB LUE  -keep splint clean dry intact  -rest ice elevate affected elbow  -sling for comfort  -NV grossly intact post splint application  - with hx of multiple myeloma, dementia, patient not surgical candidate as risk of surgery will be high  - Recommend Saenz brace as outpatient   - please FU with Dr Cardenas, Orthopedic oncologist as outpatient for further follow up and recommendations  - Ortho Stable for DC Problem: Patient Care Overview  Goal: Plan of Care Review  Outcome: Ongoing (interventions implemented as appropriate)  No changes at this time.  Will continue to monitor.

## 2018-08-02 NOTE — BRIEF OP NOTE
[facility]  Brief Operative Note    SUMMARY     Surgery Date: 9/25/2017     Surgeon(s) and Role:     * Garret Small MD - Primary     * Julia Boyce MD - Resident - Assisting     * Aspen Vaughn MD - Resident - Assisting     * Ugo Tafoya MD - Assisting     * Siri Casillas MD        Pre-op Diagnosis:  Placenta accreta, third trimester [O43.213]    Post-op Diagnosis:  Post-Op Diagnosis Codes:     * Placenta accreta, third trimester [O43.213]    Procedure(s) (LRB):  HYSTERECTOMY-ABDOMINAL-TOTAL (LINSEY) (N/A)    Anesthesia: Choice    Description of the findings of the procedure:   1) Previous midline incision with staples in place, removed  2) Uterus with previous classical incision healing.    3) Normal appearing bilateral fallopian tubes and ovaries  4) Dilated cervix and lower uterine segment with placental tissue in anterior uterus by lower uterine segment  5) Minimal adhesions noted near the bladder, taken down    Estimated Blood Loss: 700 mL         Specimens:   Specimen (12h ago through future)    Start     Ordered    09/25/17 0734  Specimen to Pathology - Surgery  Once     Comments:  1. Uterus, cervix and placenta      09/25/17 0736        Julia Boyce MD  PGY-4 OB/GYN  [phone]    
Patient

## 2019-02-19 PROBLEM — K43.2 INCISIONAL HERNIA, WITHOUT OBSTRUCTION OR GANGRENE: Status: ACTIVE | Noted: 2019-02-19

## 2019-04-30 PROBLEM — K43.2 INCISIONAL HERNIA, WITHOUT OBSTRUCTION OR GANGRENE: Status: RESOLVED | Noted: 2019-02-19 | Resolved: 2019-04-30

## 2021-09-29 NOTE — ASSESSMENT & PLAN NOTE
- s/p DM 9/11-9/12  - NICU will be present at time of delivery for resuscitation   Please convey results.  CBC, TSH, PSA within normal.  Glucose and hemoglobin A1c within normal. Kidney function normal.  AST normal.  ALT slightly elevated at 69 and elevated alkaline phosphatase of 160. Recommend getting a right upper quadrant ultrasound to evaluate liver and gallbladder.  Will check for hepatitis panel.  Order added.  Lipid panel showed elevated triglycerides of 730, total cholesterol 414 HDL low.  Advised on diet and keeping himself active.  Avoid simple carbohydrates.  Avoid fatty and greasy food.  Start with Crestor 20 mg daily. Repeat CMP, lipid in 3 months. Please send prescription and place order. Order for RUSSELL ADORNO.

## 2021-10-25 ENCOUNTER — OFFICE VISIT (OUTPATIENT)
Dept: URGENT CARE | Facility: CLINIC | Age: 36
End: 2021-10-25
Payer: MEDICAID

## 2021-10-25 VITALS
SYSTOLIC BLOOD PRESSURE: 118 MMHG | HEIGHT: 69 IN | RESPIRATION RATE: 16 BRPM | BODY MASS INDEX: 18.87 KG/M2 | HEART RATE: 97 BPM | OXYGEN SATURATION: 100 % | TEMPERATURE: 98 F | WEIGHT: 127.38 LBS | DIASTOLIC BLOOD PRESSURE: 83 MMHG

## 2021-10-25 DIAGNOSIS — L23.7 POISON IVY DERMATITIS: Primary | ICD-10-CM

## 2021-10-25 PROCEDURE — 99203 OFFICE O/P NEW LOW 30 MIN: CPT | Mod: S$GLB,,, | Performed by: NURSE PRACTITIONER

## 2021-10-25 PROCEDURE — 99203 PR OFFICE/OUTPT VISIT, NEW, LEVL III, 30-44 MIN: ICD-10-PCS | Mod: S$GLB,,, | Performed by: NURSE PRACTITIONER

## 2021-10-25 RX ORDER — HYDROXYZINE HYDROCHLORIDE 25 MG/1
25 TABLET, FILM COATED ORAL NIGHTLY PRN
Qty: 10 TABLET | Refills: 0 | Status: SHIPPED | OUTPATIENT
Start: 2021-10-25 | End: 2022-05-03

## 2021-10-25 RX ORDER — PREDNISONE 20 MG/1
20 TABLET ORAL 2 TIMES DAILY
Qty: 10 TABLET | Refills: 0 | Status: SHIPPED | OUTPATIENT
Start: 2021-10-26 | End: 2021-10-31

## 2021-10-25 RX ORDER — DEXAMETHASONE SODIUM PHOSPHATE 4 MG/ML
8 INJECTION, SOLUTION INTRA-ARTICULAR; INTRALESIONAL; INTRAMUSCULAR; INTRAVENOUS; SOFT TISSUE
Status: COMPLETED | OUTPATIENT
Start: 2021-10-25 | End: 2021-10-25

## 2021-10-25 RX ADMIN — DEXAMETHASONE SODIUM PHOSPHATE 8 MG: 4 INJECTION, SOLUTION INTRA-ARTICULAR; INTRALESIONAL; INTRAMUSCULAR; INTRAVENOUS; SOFT TISSUE at 01:10

## 2021-12-21 ENCOUNTER — OFFICE VISIT (OUTPATIENT)
Dept: URGENT CARE | Facility: CLINIC | Age: 36
End: 2021-12-21
Payer: MEDICAID

## 2021-12-21 VITALS
WEIGHT: 133.19 LBS | HEIGHT: 69 IN | DIASTOLIC BLOOD PRESSURE: 73 MMHG | OXYGEN SATURATION: 99 % | HEART RATE: 105 BPM | BODY MASS INDEX: 19.73 KG/M2 | TEMPERATURE: 97 F | RESPIRATION RATE: 16 BRPM | SYSTOLIC BLOOD PRESSURE: 113 MMHG

## 2021-12-21 DIAGNOSIS — J06.9 VIRAL URI WITH COUGH: Primary | ICD-10-CM

## 2021-12-21 DIAGNOSIS — R30.0 DYSURIA: ICD-10-CM

## 2021-12-21 DIAGNOSIS — R51.9 NONINTRACTABLE HEADACHE, UNSPECIFIED CHRONICITY PATTERN, UNSPECIFIED HEADACHE TYPE: ICD-10-CM

## 2021-12-21 DIAGNOSIS — R05.9 COUGH: ICD-10-CM

## 2021-12-21 LAB
B-HCG UR QL: NEGATIVE
BILIRUB UR QL STRIP: NEGATIVE
CTP QC/QA: YES
FLUAV AG NPH QL: NEGATIVE
FLUBV AG NPH QL: NEGATIVE
GLUCOSE UR QL STRIP: NEGATIVE
KETONES UR QL STRIP: NEGATIVE
LEUKOCYTE ESTERASE UR QL STRIP: NEGATIVE
PH, POC UA: 5
POC BLOOD, URINE: NEGATIVE
POC NITRATES, URINE: NEGATIVE
PROT UR QL STRIP: NEGATIVE
SARS-COV-2 RDRP RESP QL NAA+PROBE: NEGATIVE
SP GR UR STRIP: 1.02 (ref 1–1.03)
UROBILINOGEN UR STRIP-ACNC: NORMAL (ref 0.1–1.1)

## 2021-12-21 PROCEDURE — 87804 INFLUENZA ASSAY W/OPTIC: CPT | Mod: QW,,, | Performed by: NURSE PRACTITIONER

## 2021-12-21 PROCEDURE — 99214 PR OFFICE/OUTPT VISIT, EST, LEVL IV, 30-39 MIN: ICD-10-PCS | Mod: S$GLB,,, | Performed by: NURSE PRACTITIONER

## 2021-12-21 PROCEDURE — U0002: ICD-10-PCS | Mod: QW,S$GLB,, | Performed by: NURSE PRACTITIONER

## 2021-12-21 PROCEDURE — 81025 POCT URINE PREGNANCY: ICD-10-PCS | Mod: S$GLB,,, | Performed by: NURSE PRACTITIONER

## 2021-12-21 PROCEDURE — 99214 OFFICE O/P EST MOD 30 MIN: CPT | Mod: S$GLB,,, | Performed by: NURSE PRACTITIONER

## 2021-12-21 PROCEDURE — U0002 COVID-19 LAB TEST NON-CDC: HCPCS | Mod: QW,S$GLB,, | Performed by: NURSE PRACTITIONER

## 2021-12-21 PROCEDURE — 81003 URINALYSIS AUTO W/O SCOPE: CPT | Mod: QW,S$GLB,, | Performed by: NURSE PRACTITIONER

## 2021-12-21 PROCEDURE — 81003 POCT URINALYSIS, DIPSTICK, AUTOMATED, W/O SCOPE: ICD-10-PCS | Mod: QW,S$GLB,, | Performed by: NURSE PRACTITIONER

## 2021-12-21 PROCEDURE — 81025 URINE PREGNANCY TEST: CPT | Mod: S$GLB,,, | Performed by: NURSE PRACTITIONER

## 2021-12-21 PROCEDURE — 87804 POCT INFLUENZA A/B: ICD-10-PCS | Mod: QW,,, | Performed by: NURSE PRACTITIONER

## 2021-12-21 RX ORDER — FLUTICASONE PROPIONATE 50 MCG
1 SPRAY, SUSPENSION (ML) NASAL DAILY
Qty: 15.8 ML | Refills: 0 | Status: SHIPPED | OUTPATIENT
Start: 2021-12-21 | End: 2022-05-03

## 2021-12-21 RX ORDER — GUAIFENESIN AND DEXTROMETHORPHAN HYDROBROMIDE 1200; 60 MG/1; MG/1
1 TABLET, EXTENDED RELEASE ORAL EVERY 12 HOURS PRN
Qty: 20 TABLET | Refills: 0 | Status: SHIPPED | OUTPATIENT
Start: 2021-12-21 | End: 2021-12-31

## 2021-12-21 RX ORDER — NITROFURANTOIN 25; 75 MG/1; MG/1
100 CAPSULE ORAL 2 TIMES DAILY
Qty: 14 CAPSULE | Refills: 0 | Status: SHIPPED | OUTPATIENT
Start: 2021-12-21 | End: 2021-12-28

## 2021-12-23 LAB
BACTERIA UR CULT: NORMAL
BACTERIA UR CULT: NORMAL

## 2022-05-03 ENCOUNTER — OFFICE VISIT (OUTPATIENT)
Dept: URGENT CARE | Facility: CLINIC | Age: 37
End: 2022-05-03
Payer: MEDICAID

## 2022-05-03 VITALS
HEART RATE: 93 BPM | WEIGHT: 127.19 LBS | BODY MASS INDEX: 18.84 KG/M2 | HEIGHT: 69 IN | RESPIRATION RATE: 16 BRPM | OXYGEN SATURATION: 100 % | TEMPERATURE: 98 F | DIASTOLIC BLOOD PRESSURE: 90 MMHG | SYSTOLIC BLOOD PRESSURE: 129 MMHG

## 2022-05-03 DIAGNOSIS — R19.7 NAUSEA VOMITING AND DIARRHEA: ICD-10-CM

## 2022-05-03 DIAGNOSIS — K29.70 VIRAL GASTRITIS: ICD-10-CM

## 2022-05-03 DIAGNOSIS — R19.7 DIARRHEA, UNSPECIFIED TYPE: Primary | ICD-10-CM

## 2022-05-03 DIAGNOSIS — R11.2 NAUSEA VOMITING AND DIARRHEA: ICD-10-CM

## 2022-05-03 PROCEDURE — 3074F SYST BP LT 130 MM HG: CPT | Mod: CPTII,S$GLB,,

## 2022-05-03 PROCEDURE — 1159F PR MEDICATION LIST DOCUMENTED IN MEDICAL RECORD: ICD-10-PCS | Mod: CPTII,S$GLB,,

## 2022-05-03 PROCEDURE — S0119 ONDANSETRON 4 MG: HCPCS | Mod: S$GLB,,,

## 2022-05-03 PROCEDURE — 3080F DIAST BP >= 90 MM HG: CPT | Mod: CPTII,S$GLB,,

## 2022-05-03 PROCEDURE — 1160F RVW MEDS BY RX/DR IN RCRD: CPT | Mod: CPTII,S$GLB,,

## 2022-05-03 PROCEDURE — 99214 PR OFFICE/OUTPT VISIT, EST, LEVL IV, 30-39 MIN: ICD-10-PCS | Mod: S$GLB,,,

## 2022-05-03 PROCEDURE — 1160F PR REVIEW ALL MEDS BY PRESCRIBER/CLIN PHARMACIST DOCUMENTED: ICD-10-PCS | Mod: CPTII,S$GLB,,

## 2022-05-03 PROCEDURE — 3080F PR MOST RECENT DIASTOLIC BLOOD PRESSURE >= 90 MM HG: ICD-10-PCS | Mod: CPTII,S$GLB,,

## 2022-05-03 PROCEDURE — 3074F PR MOST RECENT SYSTOLIC BLOOD PRESSURE < 130 MM HG: ICD-10-PCS | Mod: CPTII,S$GLB,,

## 2022-05-03 PROCEDURE — 1159F MED LIST DOCD IN RCRD: CPT | Mod: CPTII,S$GLB,,

## 2022-05-03 PROCEDURE — 3008F BODY MASS INDEX DOCD: CPT | Mod: CPTII,S$GLB,,

## 2022-05-03 PROCEDURE — S0119 PR ONDANSETRON, ORAL, 4MG: ICD-10-PCS | Mod: S$GLB,,,

## 2022-05-03 PROCEDURE — 3008F PR BODY MASS INDEX (BMI) DOCUMENTED: ICD-10-PCS | Mod: CPTII,S$GLB,,

## 2022-05-03 PROCEDURE — 99214 OFFICE O/P EST MOD 30 MIN: CPT | Mod: S$GLB,,,

## 2022-05-03 RX ORDER — ONDANSETRON 4 MG/1
4 TABLET, ORALLY DISINTEGRATING ORAL EVERY 8 HOURS PRN
Qty: 12 TABLET | Refills: 0 | Status: SHIPPED | OUTPATIENT
Start: 2022-05-03 | End: 2022-05-06

## 2022-05-03 RX ORDER — ONDANSETRON 4 MG/1
4 TABLET, ORALLY DISINTEGRATING ORAL
Status: COMPLETED | OUTPATIENT
Start: 2022-05-03 | End: 2022-05-03

## 2022-05-03 RX ADMIN — ONDANSETRON 4 MG: 4 TABLET, ORALLY DISINTEGRATING ORAL at 02:05

## 2022-05-03 NOTE — PROGRESS NOTES
"Subjective:       Patient ID: Louisa Morrow is a 36 y.o. female.    Vitals:  height is 5' 9" (1.753 m) and weight is 57.7 kg (127 lb 3.2 oz). Her oral temperature is 97.7 °F (36.5 °C). Her blood pressure is 129/90 (abnormal) and her pulse is 93. Her respiration is 16 and oxygen saturation is 100%.     Chief Complaint: Nausea    Patient presents with nausea and vomiting x 4 days. States her nephew had a stomach virus this past week and stayed at their house the entire time.     Nausea  This is a new problem. Episode onset: 5 days ago. The problem has been gradually worsening. Associated symptoms include fatigue and nausea. Pertinent negatives include no abdominal pain, chest pain, chills, coughing, diaphoresis, fever, neck pain, sore throat, vertigo or vomiting. Associated symptoms comments: Diarrhea  . Treatments tried: BC powder. The treatment provided no relief.       Constitution: Positive for fatigue. Negative for activity change, appetite change, chills, sweating and fever.   HENT: Negative for ear pain, sinus pain, sinus pressure and sore throat.    Neck: Negative for neck pain.   Cardiovascular: Negative for chest pain.   Eyes: Negative for blurred vision.   Respiratory: Negative for chest tightness, cough and shortness of breath.    Gastrointestinal: Positive for nausea and diarrhea (twice today, loose, brown). Negative for abdominal pain, vomiting, constipation, bright red blood in stool, dark colored stools and rectal bleeding.   Neurological: Negative for dizziness and history of vertigo.       Objective:      Physical Exam   Constitutional: She is oriented to person, place, and time.  Non-toxic appearance. She does not appear ill. No distress.   HENT:   Head: Normocephalic.   Nose: Nose normal.   Mouth/Throat: Mucous membranes are moist. No oropharyngeal exudate or posterior oropharyngeal erythema.   Eyes: Conjunctivae are normal.      extraocular movement intact   Cardiovascular: Normal rate, " normal heart sounds and normal pulses.   Pulmonary/Chest: Effort normal and breath sounds normal. No respiratory distress.   Abdominal: She exhibits no distension. Soft. There is no abdominal tenderness. There is no rebound, no guarding, no left CVA tenderness and no right CVA tenderness.   Musculoskeletal: Normal range of motion.         General: Normal range of motion.   Neurological: no focal deficit. She is alert and oriented to person, place, and time.   Skin: Skin is warm, dry and not diaphoretic. Capillary refill takes 2 to 3 seconds.   Psychiatric: Her behavior is normal. Mood normal.         Assessment:       1. Diarrhea, unspecified type    2. Nausea vomiting and diarrhea    3. Viral gastritis          Plan:         Diarrhea, unspecified type    Nausea vomiting and diarrhea    Viral gastritis    Other orders  -     ondansetron disintegrating tablet 4 mg  -     ondansetron (ZOFRAN-ODT) 4 MG TbDL; Take 1 tablet (4 mg total) by mouth every 8 (eight) hours as needed (nausea).  Dispense: 12 tablet; Refill: 0         Patient tolerating PO liquids and solids. No abdominal pain on exam. Patient exposed to nephew who had stomach virus and similar symptoms. Covid/FLu negative. Will give zofran and educated on BRAT diet. Informed patient to go to the ER if she is unable to tolerate PO liquids or if she develops any abdominal pain. PMHx hysterectomy.

## 2022-05-03 NOTE — PATIENT INSTRUCTIONS
"If you develop worsening of symptoms, abdominal pain, vomiting that is not relieved with medication, fever not lowered with OTC medication, go straight to the emergency room for further work up and evaluation.     Start BRAT diet " Bananas, rice, applesauce, toast". Increase hydration, use supplements like Pedialyte or Gatorade. Ensure you consume 8, 8oz glasses of water per day.     Follow up with PCP in 2-5 days if symptoms persist. Sooner if worsen.   "

## 2024-01-13 ENCOUNTER — HOSPITAL ENCOUNTER (EMERGENCY)
Facility: HOSPITAL | Age: 39
Discharge: HOME | End: 2024-01-13
Attending: EMERGENCY MEDICINE
Payer: MEDICAID

## 2024-01-13 VITALS
BODY MASS INDEX: 19.99 KG/M2 | RESPIRATION RATE: 20 BRPM | SYSTOLIC BLOOD PRESSURE: 120 MMHG | WEIGHT: 135 LBS | HEART RATE: 98 BPM | DIASTOLIC BLOOD PRESSURE: 74 MMHG | OXYGEN SATURATION: 100 % | TEMPERATURE: 100 F | HEIGHT: 69 IN

## 2024-01-13 DIAGNOSIS — N12 PYELONEPHRITIS: ICD-10-CM

## 2024-01-13 DIAGNOSIS — S30.0XXA CONTUSION OF SACRUM, INITIAL ENCOUNTER: Primary | ICD-10-CM

## 2024-01-13 LAB
ALBUMIN SERPL BCP-MCNC: 4 G/DL (ref 3.5–5.2)
ALP SERPL-CCNC: 88 U/L (ref 55–135)
ALT SERPL W/O P-5'-P-CCNC: 39 U/L (ref 10–44)
ANION GAP SERPL CALC-SCNC: 9 MMOL/L (ref 8–16)
AST SERPL-CCNC: 31 U/L (ref 10–40)
BACTERIA #/AREA URNS HPF: ABNORMAL /HPF
BASOPHILS # BLD AUTO: 0.04 K/UL (ref 0–0.2)
BASOPHILS NFR BLD: 0.2 % (ref 0–1.9)
BILIRUB SERPL-MCNC: 0.6 MG/DL (ref 0.1–1)
BILIRUB UR QL STRIP: NEGATIVE
BUN SERPL-MCNC: 8 MG/DL (ref 6–20)
CALCIUM SERPL-MCNC: 8.9 MG/DL (ref 8.7–10.5)
CHLORIDE SERPL-SCNC: 101 MMOL/L (ref 95–110)
CLARITY UR: ABNORMAL
CO2 SERPL-SCNC: 25 MMOL/L (ref 23–29)
COLOR UR: YELLOW
CREAT SERPL-MCNC: 0.6 MG/DL (ref 0.5–1.4)
DIFFERENTIAL METHOD BLD: ABNORMAL
EOSINOPHIL # BLD AUTO: 0.2 K/UL (ref 0–0.5)
EOSINOPHIL NFR BLD: 1.5 % (ref 0–8)
ERYTHROCYTE [DISTWIDTH] IN BLOOD BY AUTOMATED COUNT: 13.3 % (ref 11.5–14.5)
EST. GFR  (NO RACE VARIABLE): >60 ML/MIN/1.73 M^2
GLUCOSE SERPL-MCNC: 95 MG/DL (ref 70–110)
GLUCOSE UR QL STRIP: ABNORMAL
HCT VFR BLD AUTO: 41.5 % (ref 37–48.5)
HGB BLD-MCNC: 13.8 G/DL (ref 12–16)
HGB UR QL STRIP: ABNORMAL
HYALINE CASTS #/AREA URNS LPF: 7 /LPF
IMM GRANULOCYTES # BLD AUTO: 0.06 K/UL (ref 0–0.04)
IMM GRANULOCYTES NFR BLD AUTO: 0.4 % (ref 0–0.5)
INFLUENZA A, MOLECULAR: NEGATIVE
INFLUENZA B, MOLECULAR: NEGATIVE
KETONES UR QL STRIP: NEGATIVE
LEUKOCYTE ESTERASE UR QL STRIP: ABNORMAL
LYMPHOCYTES # BLD AUTO: 1.3 K/UL (ref 1–4.8)
LYMPHOCYTES NFR BLD: 8.2 % (ref 18–48)
MCH RBC QN AUTO: 30.1 PG (ref 27–31)
MCHC RBC AUTO-ENTMCNC: 33.3 G/DL (ref 32–36)
MCV RBC AUTO: 91 FL (ref 82–98)
MICROSCOPIC COMMENT: ABNORMAL
MONOCYTES # BLD AUTO: 2.3 K/UL (ref 0.3–1)
MONOCYTES NFR BLD: 14 % (ref 4–15)
NEUTROPHILS # BLD AUTO: 12.3 K/UL (ref 1.8–7.7)
NEUTROPHILS NFR BLD: 75.7 % (ref 38–73)
NITRITE UR QL STRIP: NEGATIVE
NRBC BLD-RTO: 0 /100 WBC
PH UR STRIP: 7 [PH] (ref 5–8)
PLATELET # BLD AUTO: 180 K/UL (ref 150–450)
PMV BLD AUTO: 10 FL (ref 9.2–12.9)
POTASSIUM SERPL-SCNC: 3.8 MMOL/L (ref 3.5–5.1)
PROT SERPL-MCNC: 7 G/DL (ref 6–8.4)
PROT UR QL STRIP: ABNORMAL
RBC # BLD AUTO: 4.58 M/UL (ref 4–5.4)
RBC #/AREA URNS HPF: 1 /HPF (ref 0–4)
SARS-COV-2 RDRP RESP QL NAA+PROBE: NEGATIVE
SODIUM SERPL-SCNC: 135 MMOL/L (ref 136–145)
SP GR UR STRIP: 1.01 (ref 1–1.03)
SPECIMEN SOURCE: NORMAL
SQUAMOUS #/AREA URNS HPF: 5 /HPF
URN SPEC COLLECT METH UR: ABNORMAL
UROBILINOGEN UR STRIP-ACNC: NEGATIVE EU/DL
WBC # BLD AUTO: 16.26 K/UL (ref 3.9–12.7)
WBC #/AREA URNS HPF: 54 /HPF (ref 0–5)

## 2024-01-13 PROCEDURE — 25000003 PHARM REV CODE 250: Performed by: EMERGENCY MEDICINE

## 2024-01-13 PROCEDURE — 96375 TX/PRO/DX INJ NEW DRUG ADDON: CPT

## 2024-01-13 PROCEDURE — 96361 HYDRATE IV INFUSION ADD-ON: CPT

## 2024-01-13 PROCEDURE — 99285 EMERGENCY DEPT VISIT HI MDM: CPT | Mod: 25

## 2024-01-13 PROCEDURE — 87502 INFLUENZA DNA AMP PROBE: CPT | Performed by: PHYSICIAN ASSISTANT

## 2024-01-13 PROCEDURE — 81001 URINALYSIS AUTO W/SCOPE: CPT | Performed by: PHYSICIAN ASSISTANT

## 2024-01-13 PROCEDURE — 96365 THER/PROPH/DIAG IV INF INIT: CPT

## 2024-01-13 PROCEDURE — 87077 CULTURE AEROBIC IDENTIFY: CPT | Performed by: PHYSICIAN ASSISTANT

## 2024-01-13 PROCEDURE — 85025 COMPLETE CBC W/AUTO DIFF WBC: CPT | Performed by: EMERGENCY MEDICINE

## 2024-01-13 PROCEDURE — 87086 URINE CULTURE/COLONY COUNT: CPT | Performed by: PHYSICIAN ASSISTANT

## 2024-01-13 PROCEDURE — 80053 COMPREHEN METABOLIC PANEL: CPT | Performed by: EMERGENCY MEDICINE

## 2024-01-13 PROCEDURE — 87186 SC STD MICRODIL/AGAR DIL: CPT | Performed by: PHYSICIAN ASSISTANT

## 2024-01-13 PROCEDURE — U0002 COVID-19 LAB TEST NON-CDC: HCPCS | Performed by: PHYSICIAN ASSISTANT

## 2024-01-13 PROCEDURE — 63600175 PHARM REV CODE 636 W HCPCS: Performed by: EMERGENCY MEDICINE

## 2024-01-13 RX ORDER — KETOROLAC TROMETHAMINE 30 MG/ML
15 INJECTION, SOLUTION INTRAMUSCULAR; INTRAVENOUS
Status: COMPLETED | OUTPATIENT
Start: 2024-01-13 | End: 2024-01-13

## 2024-01-13 RX ORDER — DIPHENHYDRAMINE HYDROCHLORIDE 50 MG/ML
12.5 INJECTION INTRAMUSCULAR; INTRAVENOUS
Status: COMPLETED | OUTPATIENT
Start: 2024-01-13 | End: 2024-01-13

## 2024-01-13 RX ORDER — HYDROCODONE BITARTRATE AND ACETAMINOPHEN 5; 325 MG/1; MG/1
1 TABLET ORAL EVERY 6 HOURS PRN
Qty: 12 TABLET | Refills: 0 | Status: SHIPPED | OUTPATIENT
Start: 2024-01-13

## 2024-01-13 RX ORDER — AMOXICILLIN AND CLAVULANATE POTASSIUM 875; 125 MG/1; MG/1
1 TABLET, FILM COATED ORAL 2 TIMES DAILY
Qty: 14 TABLET | Refills: 0 | Status: SHIPPED | OUTPATIENT
Start: 2024-01-13

## 2024-01-13 RX ORDER — ONDANSETRON 4 MG/1
4 TABLET, FILM COATED ORAL EVERY 6 HOURS PRN
Qty: 12 TABLET | Refills: 0 | Status: SHIPPED | OUTPATIENT
Start: 2024-01-13 | End: 2024-01-27

## 2024-01-13 RX ORDER — METOCLOPRAMIDE HYDROCHLORIDE 5 MG/ML
10 INJECTION INTRAMUSCULAR; INTRAVENOUS
Status: COMPLETED | OUTPATIENT
Start: 2024-01-13 | End: 2024-01-13

## 2024-01-13 RX ADMIN — KETOROLAC TROMETHAMINE 15 MG: 30 INJECTION, SOLUTION INTRAMUSCULAR; INTRAVENOUS at 03:01

## 2024-01-13 RX ADMIN — DIPHENHYDRAMINE HYDROCHLORIDE 12.5 MG: 50 INJECTION INTRAMUSCULAR; INTRAVENOUS at 05:01

## 2024-01-13 RX ADMIN — SODIUM CHLORIDE, SODIUM LACTATE, POTASSIUM CHLORIDE, AND CALCIUM CHLORIDE 1000 ML: .6; .31; .03; .02 INJECTION, SOLUTION INTRAVENOUS at 04:01

## 2024-01-13 RX ADMIN — CEFTRIAXONE SODIUM 1 G: 1 INJECTION, POWDER, FOR SOLUTION INTRAMUSCULAR; INTRAVENOUS at 04:01

## 2024-01-13 RX ADMIN — METOCLOPRAMIDE 10 MG: 5 INJECTION, SOLUTION INTRAMUSCULAR; INTRAVENOUS at 05:01

## 2024-01-13 NOTE — ED PROVIDER NOTES
Encounter Date: 2024       History     Chief Complaint   Patient presents with    Fall     Pt fell a few days ago and now c/o kidney pain, body aches and many other c/o.      38-year-old female with history of anxiety, gastroesophageal reflux, hypercholesterolemia, migraine headache.  Patient presents emergency department with complaint of lower back pain.  She states he had a mechanical trip and fall proximally 2.5 days ago and at that time landed directly on her lower tailbone.  Since then patient states has had lower back pain and bilateral flank pain.  Also comes to the hospital with complaint subjective fever, arthralgia myalgia, generalized malaise.  Patient states like she has some viral type illness.  She does describe mild suprapubic tenderness, denies any abdominal pain, no sore throat, denies ill contacts.  Patient denies any lower extremity weakness, no bowel bladder incontinence, no saddle anesthesia.      Review of patient's allergies indicates:  No Known Allergies  Past Medical History:   Diagnosis Date    Anxiety     BV (bacterial vaginosis)     Encounter for blood transfusion     GERD (gastroesophageal reflux disease)     H/O bronchitis     Hypercholesteremia     Migraines      Past Surgical History:   Procedure Laterality Date     SECTION      x 3    FRACTURE SURGERY Left     arm with hardware    HYSTERECTOMY      L Arm surgery      ROBOT-ASSISTED REPAIR OF INCISIONAL HERNIA USING DA RAQUEL XI N/A 2019    Procedure: XI ROBOTIC REPAIR, HERNIA, INCISIONAL;  Surgeon: Paris Rios MD;  Location: [facility];  Service: General;  Laterality: N/A;     Family History   Problem Relation Age of Onset    Lung cancer Maternal Grandfather     Cancer Paternal Grandmother      Social History     Tobacco Use    Smoking status: Former     Current packs/day: 0.00     Types: Cigarettes     Quit date: 2016     Years since quittin.5    Smokeless tobacco: Never   Substance Use Topics     "Alcohol use: No    Drug use: No     Comment: +THC on 3/13/2017, states "has smoked since then"     Review of Systems   Constitutional:  Negative for fever.   HENT:  Negative for sore throat.    Respiratory:  Negative for shortness of breath.    Cardiovascular:  Negative for chest pain.   Gastrointestinal:  Negative for abdominal pain, nausea and vomiting.   Genitourinary:  Positive for flank pain. Negative for dysuria, frequency, urgency, vaginal bleeding, vaginal discharge and vaginal pain.   Musculoskeletal:  Positive for arthralgias, back pain and myalgias.   Skin:  Negative for rash.   Neurological:  Negative for weakness.   Hematological:  Does not bruise/bleed easily.       Physical Exam     Initial Vitals [01/13/24 1419]   BP Pulse Resp Temp SpO2   118/87 (!) 120 19 100.1 °F (37.8 °C) 97 %      MAP       --         Physical Exam    Nursing note and vitals reviewed.  Constitutional: She appears well-developed and well-nourished.   HENT:   Head: Normocephalic and atraumatic.   Nose: Nose normal.   Mouth/Throat: Oropharynx is clear and moist.   Eyes: Conjunctivae and EOM are normal. Pupils are equal, round, and reactive to light.   Neck: Neck supple. No thyromegaly present. No tracheal deviation present.   Normal range of motion.  Cardiovascular:  Normal rate, regular rhythm, normal heart sounds and intact distal pulses.     Exam reveals no gallop and no friction rub.       No murmur heard.  Pulmonary/Chest: No stridor. No respiratory distress.   Course bilateral breath sounds no adventitious   Abdominal: Abdomen is soft. Bowel sounds are normal. She exhibits no mass.   Bilateral CVA tenderness There is no rebound and no guarding.   Musculoskeletal:         General: No edema.      Cervical back: Normal, normal range of motion and neck supple.      Thoracic back: Normal.      Lumbar back: Decreased range of motion.        Back:      Lymphadenopathy:     She has no cervical adenopathy.   Neurological: She is alert " and oriented to person, place, and time. She has normal strength and normal reflexes. GCS score is 15. GCS eye subscore is 4. GCS verbal subscore is 5. GCS motor subscore is 6.   Skin: Skin is warm and dry. Capillary refill takes less than 2 seconds.   Psychiatric: She has a normal mood and affect.         ED Course   Procedures  Labs Reviewed   URINALYSIS, REFLEX TO URINE CULTURE - Abnormal; Notable for the following components:       Result Value    Appearance, UA Hazy (*)     Protein, UA Trace (*)     Glucose, UA 2+ (*)     Occult Blood UA Trace (*)     Leukocytes, UA 2+ (*)     All other components within normal limits    Narrative:     Specimen Source->Urine   CBC W/ AUTO DIFFERENTIAL - Abnormal; Notable for the following components:    WBC 16.26 (*)     Gran # (ANC) 12.3 (*)     Immature Grans (Abs) 0.06 (*)     Mono # 2.3 (*)     Gran % 75.7 (*)     Lymph % 8.2 (*)     All other components within normal limits   COMPREHENSIVE METABOLIC PANEL - Abnormal; Notable for the following components:    Sodium 135 (*)     All other components within normal limits   URINALYSIS MICROSCOPIC - Abnormal; Notable for the following components:    WBC, UA 54 (*)     Bacteria Many (*)     Hyaline Casts, UA 7 (*)     All other components within normal limits    Narrative:     Specimen Source->Urine   CULTURE, URINE   INFLUENZA A AND B ANTIGEN    Narrative:     Specimen Source->Nasopharyngeal Swab   SARS-COV-2 RNA AMPLIFICATION, QUAL          Imaging Results              CT Lumbar Spine Without Contrast (Final result)  Result time 01/13/24 16:12:31      Final result by Walter Vaughn Jr., MD (01/13/24 16:12:31)                   Narrative:    EXAMINATION: CT SCAN OF THE LUMBAR SPINE WITHOUT CONTRAST    CLINICAL HISTORY: Low back pain. Trauma.    COMPARISON: None    CMS MANDATED QUALITY DATA - CT RADIATION  436    All CT scans at this facility utilize dose modulation, iterative reconstruction, and/or weight based dosing when  appropriate to reduce radiation dose to as low as reasonably achievable.    Technical factors: Standard cross-section of the lumbar spine was completed utilizing a multidetector scanner with supplemental coronal and sagittal reconstruction images also included along with the standard data set provided. Document DLP is 406.20.    FINDINGS:    Lordotic convexity of the lumbar spine is well-maintained with appropriate stature and contour of all the vertebral bodies included. No evidence of spinal compression fracture or osseous destructive lesions. The prevertebral soft tissues are well-maintained.    Mild broad-based disc bulge projecting from the posterior edge of the L5-S1 intervertebral disc with evidence of minimal anterior thecal sac effacement. No significant neuroforaminal stenosis.    The SI joints are well-maintained.    IMPRESSION: No evidence of acute traumatic injury. Chronic degenerative disc disease isolated to the L5-S1 intervertebral disc level.    Electronically signed by:  Walter Vaughn MD  01/13/2024 04:12 PM CST Workstation: [id number]                                     Medications   ketorolac injection 15 mg (15 mg Intravenous Given 1/13/24 1513)   lactated ringers bolus 1,000 mL (0 mLs Intravenous Stopped 1/13/24 1729)   cefTRIAXone (ROCEPHIN) 1 g in dextrose 5 % 100 mL IVPB (ready to mix) (0 g Intravenous Stopped 1/13/24 1640)   metoclopramide injection 10 mg (10 mg Intravenous Given 1/13/24 1701)   diphenhydrAMINE injection 12.5 mg (12.5 mg Intravenous Given 1/13/24 1701)     Medical Decision Making  Amount and/or Complexity of Data Reviewed  Labs: ordered.  Radiology: ordered.    Risk  Prescription drug management.               ED Course as of 01/13/24 1853   Sat Jan 13, 2024   7817 Patient seen evaluated emergency department.  Patient here with complaint of lower back pain and bilateral flank pain.  Patient states symptoms occurred after she had a mechanical slip and fall.  Patient workup  in emergency department revealed patient had white count 84307, +2 leukocyte esterase many bacteria with 54 WBCs consistent with urinary tract infection.  Patient did undergo CT lumbar spine which showed no compression fracture.  Did have L5-S1 disc herniation without cord compression.  Patient was given Rocephin and IV hydration emergency department.  She also complaint of migraine headache in which she will be received Toradol, Reglan and Benadryl.  Patient did have relief of headache.  This time she will be discharged with Augmentin to take twice daily for next 7 days.  Drink plenty of fluids.  Rest as much as possible.  Patient also given Zofran as needed for nausea.  Norco 5 mg every 4-6 hours for pain control.  She is to follow with primary care provider next week.  Return to emergency department problems persist worsen or additional concerns. [RM]      ED Course User Index  [RM] Tc Garza MD               Medical Decision Making:   Initial Assessment:   38-year-old female with history of anxiety, gastroesophageal reflux, hypercholesterolemia, migraine headache.  Patient presents emergency department with complaint of lower back pain.  She states he had a mechanical trip and fall proximally 2.5 days ago and at that time landed directly on her lower tailbone.  Since then patient states has had lower back pain and bilateral flank pain.  Also comes to the hospital with complaint subjective fever, arthralgia myalgia, generalized malaise.  Patient states like she has some viral type illness.  She does describe mild suprapubic tenderness, denies any abdominal pain, no sore throat, denies ill contacts.  Patient denies any lower extremity weakness, no bowel bladder incontinence, no saddle anesthesia.  Differential Diagnosis:   Lumbar compression fracture, musculoskeletal pain, pyelonephritis, viral syndrome, lumbar contusion  Clinical Tests:   Lab Tests: Ordered and Reviewed  Radiological Study: Ordered and  Reviewed             Clinical Impression:  Final diagnoses:  [S30.0XXA] Contusion of sacrum, initial encounter (Primary)  [N12] Pyelonephritis          ED Disposition Condition    Discharge Stable          ED Prescriptions       Medication Sig Dispense Start Date End Date Auth. Provider    HYDROcodone-acetaminophen (NORCO) 5-325 mg per tablet Take 1 tablet by mouth every 6 (six) hours as needed for Pain. 12 tablet 1/13/2024 -- Tc Garza MD    ondansetron (ZOFRAN) 4 MG tablet Take 1 tablet (4 mg total) by mouth every 6 (six) hours as needed for Nausea. 12 tablet 1/13/2024 1/27/2024 Tc Garza MD    amoxicillin-clavulanate 875-125mg (AUGMENTIN) 875-125 mg per tablet Take 1 tablet by mouth 2 (two) times daily. 14 tablet 1/13/2024 -- Tc Garza MD          Follow-up Information       Follow up With Specialties Details Why Contact Info    [address] [address]  [phone]               Tc Garza MD  01/13/24 8103       Tc Garza MD  01/13/24 0217

## 2024-01-13 NOTE — FIRST PROVIDER EVALUATION
Emergency Department TeleTriage Encounter Note      CHIEF COMPLAINT    Chief Complaint   Patient presents with    Fall     Pt fell a few days ago and now c/o kidney pain, body aches and many other c/o.        VITAL SIGNS   Initial Vitals [01/13/24 1419]   BP Pulse Resp Temp SpO2   118/87 (!) 120 19 100.1 °F (37.8 °C) 97 %      MAP       --            ALLERGIES    Review of patient's allergies indicates:  No Known Allergies    PROVIDER TRIAGE NOTE  Patient had a fall 2 days ago onto her buttock/back. She is complaining of severe low back pain. Also reports that same day she started to have fever and has had body aches, cloudy urine and headache. No sore throat or cough. No neuro deficits. Patient is tearful in triage. Does not appear toxic.       ORDERS  Labs Reviewed - No data to display    ED Orders (720h ago, onward)      None              Virtual Visit Note: The provider triage portion of this emergency department evaluation and documentation was performed via [employer], a HIPAA-compliant telemedicine application, in concert with a tele-presenter in the room. A face to face patient evaluation with one of my colleagues will occur once the patient is placed in an emergency department room.      DISCLAIMER: This note was prepared with [employer] voice recognition transcription software. Garbled syntax, mangled pronouns, and other bizarre constructions may be attributed to that software system.

## 2024-01-15 LAB — BACTERIA UR CULT: ABNORMAL

## 2024-01-15 NOTE — PROGRESS NOTES
Patient was discharged home with Augmentin which is resistant please call in Cipro 500 mg patient's take 1 tablet twice daily for the next 7 days

## 2024-09-19 ENCOUNTER — HOSPITAL ENCOUNTER (EMERGENCY)
Facility: HOSPITAL | Age: 39
Discharge: HOME | End: 2024-09-19
Attending: STUDENT IN AN ORGANIZED HEALTH CARE EDUCATION/TRAINING PROGRAM

## 2024-09-19 VITALS
HEART RATE: 97 BPM | HEIGHT: 69 IN | OXYGEN SATURATION: 99 % | RESPIRATION RATE: 20 BRPM | BODY MASS INDEX: 17.77 KG/M2 | WEIGHT: 120 LBS | SYSTOLIC BLOOD PRESSURE: 120 MMHG | TEMPERATURE: 99 F | DIASTOLIC BLOOD PRESSURE: 70 MMHG

## 2024-09-19 DIAGNOSIS — R07.9 CHEST PAIN: ICD-10-CM

## 2024-09-19 DIAGNOSIS — N12 PYELONEPHRITIS: Primary | ICD-10-CM

## 2024-09-19 LAB
ALBUMIN SERPL BCP-MCNC: 3.9 G/DL (ref 3.5–5.2)
ALP SERPL-CCNC: 104 U/L (ref 55–135)
ALT SERPL W/O P-5'-P-CCNC: 18 U/L (ref 10–44)
ANION GAP SERPL CALC-SCNC: 10 MMOL/L (ref 8–16)
AST SERPL-CCNC: 14 U/L (ref 10–40)
BASOPHILS # BLD AUTO: 0.03 K/UL (ref 0–0.2)
BASOPHILS NFR BLD: 0.2 % (ref 0–1.9)
BILIRUB SERPL-MCNC: 0.3 MG/DL (ref 0.1–1)
BILIRUB UR QL STRIP: NEGATIVE
BNP SERPL-MCNC: 28 PG/ML (ref 0–99)
BUN SERPL-MCNC: 14 MG/DL (ref 6–20)
CALCIUM SERPL-MCNC: 9 MG/DL (ref 8.7–10.5)
CHLORIDE SERPL-SCNC: 101 MMOL/L (ref 95–110)
CLARITY UR: CLEAR
CO2 SERPL-SCNC: 27 MMOL/L (ref 23–29)
COLOR UR: YELLOW
CREAT SERPL-MCNC: 0.6 MG/DL (ref 0.5–1.4)
D DIMER PPP IA.FEU-MCNC: 0.67 MG/L FEU (ref 0–0.49)
DIFFERENTIAL METHOD BLD: ABNORMAL
EOSINOPHIL # BLD AUTO: 0.2 K/UL (ref 0–0.5)
EOSINOPHIL NFR BLD: 1.9 % (ref 0–8)
ERYTHROCYTE [DISTWIDTH] IN BLOOD BY AUTOMATED COUNT: 13.2 % (ref 11.5–14.5)
EST. GFR  (NO RACE VARIABLE): >60 ML/MIN/1.73 M^2
GLUCOSE SERPL-MCNC: 95 MG/DL (ref 70–110)
GLUCOSE UR QL STRIP: NEGATIVE
HCT VFR BLD AUTO: 41.3 % (ref 37–48.5)
HGB BLD-MCNC: 13.8 G/DL (ref 12–16)
HGB UR QL STRIP: NEGATIVE
IMM GRANULOCYTES # BLD AUTO: 0.05 K/UL (ref 0–0.04)
IMM GRANULOCYTES NFR BLD AUTO: 0.4 % (ref 0–0.5)
INFLUENZA A, MOLECULAR: NEGATIVE
INFLUENZA B, MOLECULAR: NEGATIVE
KETONES UR QL STRIP: NEGATIVE
LEUKOCYTE ESTERASE UR QL STRIP: ABNORMAL
LYMPHOCYTES # BLD AUTO: 1.6 K/UL (ref 1–4.8)
LYMPHOCYTES NFR BLD: 12.9 % (ref 18–48)
MAGNESIUM SERPL-MCNC: 1.9 MG/DL (ref 1.6–2.6)
MCH RBC QN AUTO: 30.5 PG (ref 27–31)
MCHC RBC AUTO-ENTMCNC: 33.4 G/DL (ref 32–36)
MCV RBC AUTO: 91 FL (ref 82–98)
MICROSCOPIC COMMENT: ABNORMAL
MONOCYTES # BLD AUTO: 1.6 K/UL (ref 0.3–1)
MONOCYTES NFR BLD: 12.8 % (ref 4–15)
NEUTROPHILS # BLD AUTO: 8.7 K/UL (ref 1.8–7.7)
NEUTROPHILS NFR BLD: 71.8 % (ref 38–73)
NITRITE UR QL STRIP: NEGATIVE
NRBC BLD-RTO: 0 /100 WBC
PH UR STRIP: 7 [PH] (ref 5–8)
PLATELET # BLD AUTO: 224 K/UL (ref 150–450)
PMV BLD AUTO: 10.3 FL (ref 9.2–12.9)
POTASSIUM SERPL-SCNC: 3.6 MMOL/L (ref 3.5–5.1)
PROT SERPL-MCNC: 6.7 G/DL (ref 6–8.4)
PROT UR QL STRIP: ABNORMAL
RBC # BLD AUTO: 4.53 M/UL (ref 4–5.4)
RBC #/AREA URNS HPF: 2 /HPF (ref 0–4)
SARS-COV-2 RDRP RESP QL NAA+PROBE: NEGATIVE
SODIUM SERPL-SCNC: 138 MMOL/L (ref 136–145)
SP GR UR STRIP: 1.02 (ref 1–1.03)
SPECIMEN SOURCE: NORMAL
SQUAMOUS #/AREA URNS HPF: 3 /HPF
TROPONIN I SERPL HS-MCNC: 6.2 PG/ML (ref 0–14.9)
TROPONIN I SERPL HS-MCNC: 7.7 PG/ML (ref 0–14.9)
TSH SERPL DL<=0.005 MIU/L-ACNC: 2.48 UIU/ML (ref 0.34–5.6)
URN SPEC COLLECT METH UR: ABNORMAL
UROBILINOGEN UR STRIP-ACNC: >=8 EU/DL
WBC # BLD AUTO: 12.1 K/UL (ref 3.9–12.7)
WBC #/AREA URNS HPF: 24 /HPF (ref 0–5)

## 2024-09-19 PROCEDURE — 96375 TX/PRO/DX INJ NEW DRUG ADDON: CPT

## 2024-09-19 PROCEDURE — 96361 HYDRATE IV INFUSION ADD-ON: CPT

## 2024-09-19 PROCEDURE — 99285 EMERGENCY DEPT VISIT HI MDM: CPT | Mod: 25

## 2024-09-19 PROCEDURE — 87040 BLOOD CULTURE FOR BACTERIA: CPT | Performed by: STUDENT IN AN ORGANIZED HEALTH CARE EDUCATION/TRAINING PROGRAM

## 2024-09-19 PROCEDURE — 83880 ASSAY OF NATRIURETIC PEPTIDE: CPT | Performed by: STUDENT IN AN ORGANIZED HEALTH CARE EDUCATION/TRAINING PROGRAM

## 2024-09-19 PROCEDURE — 80053 COMPREHEN METABOLIC PANEL: CPT | Performed by: STUDENT IN AN ORGANIZED HEALTH CARE EDUCATION/TRAINING PROGRAM

## 2024-09-19 PROCEDURE — 36415 COLL VENOUS BLD VENIPUNCTURE: CPT | Performed by: STUDENT IN AN ORGANIZED HEALTH CARE EDUCATION/TRAINING PROGRAM

## 2024-09-19 PROCEDURE — 85025 COMPLETE CBC W/AUTO DIFF WBC: CPT | Performed by: STUDENT IN AN ORGANIZED HEALTH CARE EDUCATION/TRAINING PROGRAM

## 2024-09-19 PROCEDURE — 63600175 PHARM REV CODE 636 W HCPCS: Performed by: STUDENT IN AN ORGANIZED HEALTH CARE EDUCATION/TRAINING PROGRAM

## 2024-09-19 PROCEDURE — 81001 URINALYSIS AUTO W/SCOPE: CPT | Performed by: STUDENT IN AN ORGANIZED HEALTH CARE EDUCATION/TRAINING PROGRAM

## 2024-09-19 PROCEDURE — 85379 FIBRIN DEGRADATION QUANT: CPT | Performed by: STUDENT IN AN ORGANIZED HEALTH CARE EDUCATION/TRAINING PROGRAM

## 2024-09-19 PROCEDURE — 25500020 PHARM REV CODE 255: Performed by: STUDENT IN AN ORGANIZED HEALTH CARE EDUCATION/TRAINING PROGRAM

## 2024-09-19 PROCEDURE — U0002 COVID-19 LAB TEST NON-CDC: HCPCS | Performed by: STUDENT IN AN ORGANIZED HEALTH CARE EDUCATION/TRAINING PROGRAM

## 2024-09-19 PROCEDURE — 84443 ASSAY THYROID STIM HORMONE: CPT | Performed by: STUDENT IN AN ORGANIZED HEALTH CARE EDUCATION/TRAINING PROGRAM

## 2024-09-19 PROCEDURE — 83735 ASSAY OF MAGNESIUM: CPT | Performed by: STUDENT IN AN ORGANIZED HEALTH CARE EDUCATION/TRAINING PROGRAM

## 2024-09-19 PROCEDURE — 84484 ASSAY OF TROPONIN QUANT: CPT | Performed by: STUDENT IN AN ORGANIZED HEALTH CARE EDUCATION/TRAINING PROGRAM

## 2024-09-19 PROCEDURE — 25000003 PHARM REV CODE 250: Performed by: STUDENT IN AN ORGANIZED HEALTH CARE EDUCATION/TRAINING PROGRAM

## 2024-09-19 PROCEDURE — 87086 URINE CULTURE/COLONY COUNT: CPT | Performed by: STUDENT IN AN ORGANIZED HEALTH CARE EDUCATION/TRAINING PROGRAM

## 2024-09-19 PROCEDURE — 96374 THER/PROPH/DIAG INJ IV PUSH: CPT

## 2024-09-19 PROCEDURE — 87502 INFLUENZA DNA AMP PROBE: CPT | Performed by: STUDENT IN AN ORGANIZED HEALTH CARE EDUCATION/TRAINING PROGRAM

## 2024-09-19 RX ORDER — ONDANSETRON 4 MG/1
4 TABLET, ORALLY DISINTEGRATING ORAL EVERY 6 HOURS PRN
Qty: 3 TABLET | Refills: 0 | Status: SHIPPED | OUTPATIENT
Start: 2024-09-19

## 2024-09-19 RX ORDER — CEFDINIR 300 MG/1
300 CAPSULE ORAL 2 TIMES DAILY
Qty: 20 CAPSULE | Refills: 0 | Status: SHIPPED | OUTPATIENT
Start: 2024-09-19 | End: 2024-09-29

## 2024-09-19 RX ORDER — ONDANSETRON HYDROCHLORIDE 2 MG/ML
4 INJECTION, SOLUTION INTRAVENOUS
Status: COMPLETED | OUTPATIENT
Start: 2024-09-19 | End: 2024-09-19

## 2024-09-19 RX ORDER — KETOROLAC TROMETHAMINE 30 MG/ML
15 INJECTION, SOLUTION INTRAMUSCULAR; INTRAVENOUS
Status: COMPLETED | OUTPATIENT
Start: 2024-09-19 | End: 2024-09-19

## 2024-09-19 RX ORDER — ACETAMINOPHEN 500 MG
1000 TABLET ORAL
Status: COMPLETED | OUTPATIENT
Start: 2024-09-19 | End: 2024-09-19

## 2024-09-19 RX ADMIN — ACETAMINOPHEN 1000 MG: 500 TABLET, FILM COATED ORAL at 03:09

## 2024-09-19 RX ADMIN — SODIUM CHLORIDE, POTASSIUM CHLORIDE, SODIUM LACTATE AND CALCIUM CHLORIDE 1000 ML: 600; 310; 30; 20 INJECTION, SOLUTION INTRAVENOUS at 03:09

## 2024-09-19 RX ADMIN — KETOROLAC TROMETHAMINE 15 MG: 30 INJECTION, SOLUTION INTRAMUSCULAR; INTRAVENOUS at 03:09

## 2024-09-19 RX ADMIN — ONDANSETRON 4 MG: 2 INJECTION INTRAMUSCULAR; INTRAVENOUS at 03:09

## 2024-09-19 RX ADMIN — IOHEXOL 100 ML: 350 INJECTION, SOLUTION INTRAVENOUS at 04:09

## 2024-09-19 NOTE — DISCHARGE INSTRUCTIONS
You were diagnosed with a kidney infection.  You need to take the antibiotics prescribed to you today and monitor your symptoms with any worsening you need to return to the ER immediately.  You need to see your primary care doctor within 3-4 days for re-evaluation.    You have been prescribed a medication called Zofran which can help with nausea and vomiting.  Please take as prescribed.  Antibiotic for your kidney infection is called cefdinir, take 1 capsule every 12 hours for 10 days.  For pain and discomfort you can take 1000 mg of Tylenol and 400 mg of ibuprofen every 6 hours.  Please make sure that you were not ingesting any other sources of Tylenol also called acetaminophen as overdosing on Tylenol can kill you.  You should not have more than 4000 mg of Tylenol per day.

## 2024-09-22 LAB — BACTERIA UR CULT: NO GROWTH

## 2024-09-24 LAB
BACTERIA BLD CULT: NORMAL
BACTERIA BLD CULT: NORMAL

## (undated) DEVICE — SOL WATER STRL IRR 1000ML

## (undated) DEVICE — DRESSING ABSRBNT ISLAND 3.6X8

## (undated) DEVICE — KIT URINE METER 350ML STAT LOC

## (undated) DEVICE — Device

## (undated) DEVICE — SUT CHROME GUT 1 CT-2 27

## (undated) DEVICE — SPONGE LAP 18X18 PREWASHED

## (undated) DEVICE — HANDLE EZ 3641

## (undated) DEVICE — UNDERGLOVES BIOGEL PI SIZE 8.5

## (undated) DEVICE — SUT 1 48IN PDS II VIO MONO

## (undated) DEVICE — SUT SILK 2-0 STRANDS 30IN

## (undated) DEVICE — SEE MEDLINE ITEM 152622

## (undated) DEVICE — LEGGING CLEAR POLY 2/PACK

## (undated) DEVICE — BLADE SURG STAINLESS STEEL #10

## (undated) DEVICE — STAPLER SKIN ROTATING HEAD

## (undated) DEVICE — GLOVE BIOGEL SKINSENSE PI 7.5

## (undated) DEVICE — TRAY FOLEY 16FR INFECTION CONT

## (undated) DEVICE — SEPRAFILM 3 X 5  MULTI-PACK

## (undated) DEVICE — FIBRILLAR ABS HEMOSTAT 4X4

## (undated) DEVICE — SOL 9P NACL IRR PIC IL

## (undated) DEVICE — GLOVE PROTEXIS NEOPRENE 7.5

## (undated) DEVICE — DRAPE UNDER BUTTOCKS WITH POUCH

## (undated) DEVICE — WARMER DRAPE STERILE LF

## (undated) DEVICE — SEALER LIGASURE IMPACT 18CM

## (undated) DEVICE — SUT 1 36IN CHROMIC GUT CTX

## (undated) DEVICE — APPLICATOR CHLORAPREP ORN 26ML

## (undated) DEVICE — GLOVE BIOGEL SKINSENSE PI 8.5

## (undated) DEVICE — DRAPE LAP TIBURON 77X122IN

## (undated) DEVICE — SEE MEDLINE ITEM 157117

## (undated) DEVICE — SEE MEDLINE ITEM 153151

## (undated) DEVICE — GLOVE BIOGEL SKINSENSE PI 6.5

## (undated) DEVICE — SOL IRR NACL .9% 3000ML

## (undated) DEVICE — DRAPE STERI LONG

## (undated) DEVICE — CLOSURE SKIN STERI STRIP 1/2X4

## (undated) DEVICE — PAD PREP 50/CA

## (undated) DEVICE — SOL BETADINE 5%

## (undated) DEVICE — SOL PVP-I SCRUB 7.5% 4OZ

## (undated) DEVICE — DRESSING GZ XRAY 12PLY 4X8 STR

## (undated) DEVICE — GLOVE BIOGEL SKINSENSE PI 8.0

## (undated) DEVICE — TRAY DRY SKIN SCRUB PREP

## (undated) DEVICE — SEE MEDLINE ITEM 146296

## (undated) DEVICE — SUT PDS II 96IN XLH TAPER

## (undated) DEVICE — ELECTRODE REM PLYHSV RETURN 9

## (undated) DEVICE — SEE MEDLINE ITEM 157110

## (undated) DEVICE — JELLY KY LUBRICATING 5G PACKET

## (undated) DEVICE — SEE MEDLINE ITEM 156931

## (undated) DEVICE — ELECTRODE BLADE TEFLON 6

## (undated) DEVICE — SEE MEDLINE ITEM 152512